# Patient Record
Sex: FEMALE | Race: BLACK OR AFRICAN AMERICAN | HISPANIC OR LATINO | Employment: UNEMPLOYED | URBAN - METROPOLITAN AREA
[De-identification: names, ages, dates, MRNs, and addresses within clinical notes are randomized per-mention and may not be internally consistent; named-entity substitution may affect disease eponyms.]

---

## 2019-11-19 ENCOUNTER — APPOINTMENT (EMERGENCY)
Dept: CT IMAGING | Facility: HOSPITAL | Age: 49
End: 2019-11-19
Payer: COMMERCIAL

## 2019-11-19 ENCOUNTER — HOSPITAL ENCOUNTER (OUTPATIENT)
Facility: HOSPITAL | Age: 49
Setting detail: OBSERVATION
Discharge: HOME | End: 2019-11-20
Attending: EMERGENCY MEDICINE | Admitting: HOSPITALIST
Payer: COMMERCIAL

## 2019-11-19 DIAGNOSIS — R22.1 NECK SWELLING: Primary | ICD-10-CM

## 2019-11-19 DIAGNOSIS — M60.9 MYOSITIS: ICD-10-CM

## 2019-11-19 LAB
AMORPH URATE CRY URNS QL MICRO: ABNORMAL /HPF
ANION GAP SERPL CALCULATED.3IONS-SCNC: 8 MMOL/L (ref 4–13)
BACTERIA UR QL AUTO: ABNORMAL /HPF
BASOPHILS # BLD AUTO: 0.06 THOUSANDS/ΜL (ref 0–0.1)
BASOPHILS NFR BLD AUTO: 1 % (ref 0–1)
BILIRUB UR QL STRIP: NEGATIVE
BUN SERPL-MCNC: 9 MG/DL (ref 5–25)
CALCIUM SERPL-MCNC: 8.6 MG/DL (ref 8.3–10.1)
CHLORIDE SERPL-SCNC: 104 MMOL/L (ref 100–108)
CLARITY UR: CLEAR
CO2 SERPL-SCNC: 27 MMOL/L (ref 21–32)
COLOR UR: YELLOW
CREAT SERPL-MCNC: 0.74 MG/DL (ref 0.6–1.3)
EOSINOPHIL # BLD AUTO: 0.2 THOUSAND/ΜL (ref 0–0.61)
EOSINOPHIL NFR BLD AUTO: 2 % (ref 0–6)
ERYTHROCYTE [DISTWIDTH] IN BLOOD BY AUTOMATED COUNT: 14.9 % (ref 11.6–15.1)
EXT PREG TEST URINE: NORMAL
EXT. CONTROL ED NAV: NORMAL
GFR SERPL CREATININE-BSD FRML MDRD: 95 ML/MIN/1.73SQ M
GLUCOSE SERPL-MCNC: 111 MG/DL (ref 65–140)
GLUCOSE UR STRIP-MCNC: NEGATIVE MG/DL
HCT VFR BLD AUTO: 40.5 % (ref 34.8–46.1)
HGB BLD-MCNC: 12.6 G/DL (ref 11.5–15.4)
HGB UR QL STRIP.AUTO: ABNORMAL
IMM GRANULOCYTES # BLD AUTO: 0.02 THOUSAND/UL (ref 0–0.2)
IMM GRANULOCYTES NFR BLD AUTO: 0 % (ref 0–2)
KETONES UR STRIP-MCNC: NEGATIVE MG/DL
LEUKOCYTE ESTERASE UR QL STRIP: NEGATIVE
LYMPHOCYTES # BLD AUTO: 2.9 THOUSANDS/ΜL (ref 0.6–4.47)
LYMPHOCYTES NFR BLD AUTO: 29 % (ref 14–44)
MCH RBC QN AUTO: 28.7 PG (ref 26.8–34.3)
MCHC RBC AUTO-ENTMCNC: 31.1 G/DL (ref 31.4–37.4)
MCV RBC AUTO: 92 FL (ref 82–98)
MONOCYTES # BLD AUTO: 0.8 THOUSAND/ΜL (ref 0.17–1.22)
MONOCYTES NFR BLD AUTO: 8 % (ref 4–12)
NEUTROPHILS # BLD AUTO: 6.05 THOUSANDS/ΜL (ref 1.85–7.62)
NEUTS SEG NFR BLD AUTO: 60 % (ref 43–75)
NITRITE UR QL STRIP: NEGATIVE
NON-SQ EPI CELLS URNS QL MICRO: ABNORMAL /HPF
NRBC BLD AUTO-RTO: 0 /100 WBCS
PH UR STRIP.AUTO: 6 [PH] (ref 4.5–8)
PLATELET # BLD AUTO: 254 THOUSANDS/UL (ref 149–390)
PMV BLD AUTO: 11.4 FL (ref 8.9–12.7)
POTASSIUM SERPL-SCNC: 3.6 MMOL/L (ref 3.5–5.3)
PROT UR STRIP-MCNC: ABNORMAL MG/DL
RBC # BLD AUTO: 4.39 MILLION/UL (ref 3.81–5.12)
RBC #/AREA URNS AUTO: ABNORMAL /HPF
SODIUM SERPL-SCNC: 139 MMOL/L (ref 136–145)
SP GR UR STRIP.AUTO: 1.02 (ref 1–1.03)
UROBILINOGEN UR QL STRIP.AUTO: 0.2 E.U./DL
WBC # BLD AUTO: 10.03 THOUSAND/UL (ref 4.31–10.16)
WBC #/AREA URNS AUTO: ABNORMAL /HPF

## 2019-11-19 PROCEDURE — 96374 THER/PROPH/DIAG INJ IV PUSH: CPT

## 2019-11-19 PROCEDURE — 85025 COMPLETE CBC W/AUTO DIFF WBC: CPT | Performed by: EMERGENCY MEDICINE

## 2019-11-19 PROCEDURE — 99284 EMERGENCY DEPT VISIT MOD MDM: CPT

## 2019-11-19 PROCEDURE — 70491 CT SOFT TISSUE NECK W/DYE: CPT

## 2019-11-19 PROCEDURE — 96361 HYDRATE IV INFUSION ADD-ON: CPT

## 2019-11-19 PROCEDURE — 81025 URINE PREGNANCY TEST: CPT | Performed by: EMERGENCY MEDICINE

## 2019-11-19 PROCEDURE — 36415 COLL VENOUS BLD VENIPUNCTURE: CPT | Performed by: EMERGENCY MEDICINE

## 2019-11-19 PROCEDURE — 81001 URINALYSIS AUTO W/SCOPE: CPT

## 2019-11-19 PROCEDURE — 99285 EMERGENCY DEPT VISIT HI MDM: CPT | Performed by: EMERGENCY MEDICINE

## 2019-11-19 PROCEDURE — 80048 BASIC METABOLIC PNL TOTAL CA: CPT | Performed by: EMERGENCY MEDICINE

## 2019-11-19 RX ORDER — KETOROLAC TROMETHAMINE 30 MG/ML
30 INJECTION, SOLUTION INTRAMUSCULAR; INTRAVENOUS ONCE
Status: COMPLETED | OUTPATIENT
Start: 2019-11-19 | End: 2019-11-19

## 2019-11-19 RX ADMIN — SODIUM CHLORIDE 500 ML: 0.9 INJECTION, SOLUTION INTRAVENOUS at 22:44

## 2019-11-19 RX ADMIN — IOHEXOL 85 ML: 350 INJECTION, SOLUTION INTRAVENOUS at 23:40

## 2019-11-19 RX ADMIN — KETOROLAC TROMETHAMINE 30 MG: 30 INJECTION, SOLUTION INTRAMUSCULAR at 22:44

## 2019-11-20 VITALS
RESPIRATION RATE: 16 BRPM | DIASTOLIC BLOOD PRESSURE: 73 MMHG | HEART RATE: 88 BPM | SYSTOLIC BLOOD PRESSURE: 129 MMHG | OXYGEN SATURATION: 96 % | TEMPERATURE: 98.1 F | WEIGHT: 229.28 LBS

## 2019-11-20 PROBLEM — M60.9 MYOSITIS: Status: ACTIVE | Noted: 2019-11-20

## 2019-11-20 PROBLEM — E66.09 OBESITY DUE TO EXCESS CALORIES: Status: ACTIVE | Noted: 2019-11-20

## 2019-11-20 LAB
ALBUMIN SERPL BCP-MCNC: 3.4 G/DL (ref 3.5–5)
ALP SERPL-CCNC: 154 U/L (ref 46–116)
ALT SERPL W P-5'-P-CCNC: 72 U/L (ref 12–78)
AST SERPL W P-5'-P-CCNC: 48 U/L (ref 5–45)
BILIRUB DIRECT SERPL-MCNC: 0.14 MG/DL (ref 0–0.2)
BILIRUB SERPL-MCNC: 0.24 MG/DL (ref 0.2–1)
CK SERPL-CCNC: 56 U/L (ref 26–192)
PROCALCITONIN SERPL-MCNC: <0.05 NG/ML
PROT SERPL-MCNC: 8.2 G/DL (ref 6.4–8.2)

## 2019-11-20 PROCEDURE — 36415 COLL VENOUS BLD VENIPUNCTURE: CPT | Performed by: EMERGENCY MEDICINE

## 2019-11-20 PROCEDURE — 82550 ASSAY OF CK (CPK): CPT | Performed by: EMERGENCY MEDICINE

## 2019-11-20 PROCEDURE — 84145 PROCALCITONIN (PCT): CPT | Performed by: PHYSICIAN ASSISTANT

## 2019-11-20 PROCEDURE — 99234 HOSP IP/OBS SM DT SF/LOW 45: CPT | Performed by: HOSPITALIST

## 2019-11-20 PROCEDURE — 80076 HEPATIC FUNCTION PANEL: CPT | Performed by: PHYSICIAN ASSISTANT

## 2019-11-20 RX ORDER — METHYLPREDNISOLONE SODIUM SUCCINATE 40 MG/ML
40 INJECTION, POWDER, LYOPHILIZED, FOR SOLUTION INTRAMUSCULAR; INTRAVENOUS EVERY 12 HOURS SCHEDULED
Status: DISCONTINUED | OUTPATIENT
Start: 2019-11-20 | End: 2019-11-20 | Stop reason: HOSPADM

## 2019-11-20 RX ORDER — ACETAMINOPHEN 325 MG/1
650 TABLET ORAL EVERY 6 HOURS PRN
Status: DISCONTINUED | OUTPATIENT
Start: 2019-11-20 | End: 2019-11-20 | Stop reason: HOSPADM

## 2019-11-20 RX ORDER — KETOROLAC TROMETHAMINE 30 MG/ML
30 INJECTION, SOLUTION INTRAMUSCULAR; INTRAVENOUS ONCE
Status: COMPLETED | OUTPATIENT
Start: 2019-11-20 | End: 2019-11-20

## 2019-11-20 RX ORDER — TRAMADOL HYDROCHLORIDE 50 MG/1
50 TABLET ORAL EVERY 6 HOURS PRN
Status: DISCONTINUED | OUTPATIENT
Start: 2019-11-20 | End: 2019-11-20 | Stop reason: HOSPADM

## 2019-11-20 RX ORDER — METHYLPREDNISOLONE 4 MG/1
TABLET ORAL
Qty: 1 EACH | Refills: 0 | Status: SHIPPED | OUTPATIENT
Start: 2019-11-20 | End: 2021-07-02

## 2019-11-20 RX ORDER — AMOXICILLIN AND CLAVULANATE POTASSIUM 875; 125 MG/1; MG/1
1 TABLET, FILM COATED ORAL EVERY 12 HOURS SCHEDULED
Qty: 20 TABLET | Refills: 0 | Status: SHIPPED | OUTPATIENT
Start: 2019-11-20 | End: 2019-11-30

## 2019-11-20 RX ORDER — OXYCODONE HYDROCHLORIDE 5 MG/1
5 TABLET ORAL EVERY 6 HOURS PRN
Status: DISCONTINUED | OUTPATIENT
Start: 2019-11-20 | End: 2019-11-20 | Stop reason: HOSPADM

## 2019-11-20 RX ORDER — CEFAZOLIN SODIUM 2 G/50ML
2000 SOLUTION INTRAVENOUS EVERY 8 HOURS
Status: DISCONTINUED | OUTPATIENT
Start: 2019-11-20 | End: 2019-11-20 | Stop reason: HOSPADM

## 2019-11-20 RX ORDER — ONDANSETRON 2 MG/ML
4 INJECTION INTRAMUSCULAR; INTRAVENOUS EVERY 6 HOURS PRN
Status: DISCONTINUED | OUTPATIENT
Start: 2019-11-20 | End: 2019-11-20 | Stop reason: HOSPADM

## 2019-11-20 RX ADMIN — CEFAZOLIN SODIUM 2000 MG: 2 SOLUTION INTRAVENOUS at 10:59

## 2019-11-20 RX ADMIN — METRONIDAZOLE 500 MG: 500 INJECTION, SOLUTION INTRAVENOUS at 03:35

## 2019-11-20 RX ADMIN — METHYLPREDNISOLONE SODIUM SUCCINATE 40 MG: 40 INJECTION, POWDER, FOR SOLUTION INTRAMUSCULAR; INTRAVENOUS at 02:21

## 2019-11-20 RX ADMIN — KETOROLAC TROMETHAMINE 30 MG: 30 INJECTION, SOLUTION INTRAMUSCULAR at 01:18

## 2019-11-20 RX ADMIN — METRONIDAZOLE 500 MG: 500 INJECTION, SOLUTION INTRAVENOUS at 11:36

## 2019-11-20 RX ADMIN — CEFAZOLIN SODIUM 2000 MG: 2 SOLUTION INTRAVENOUS at 02:30

## 2019-11-20 NOTE — H&P
H&P- Hadley Tanner 1970, 51 y o  female MRN: [id number]    Unit/Bed#: [facility] [address] 220-02 Encounter: [id number]    Primary Care Provider: No primary care provider on file  Date and time admitted to hospital: 11/19/2019 10:09 PM    * Myositis  Assessment & Plan  · Presents to the emergency department with left sided neck pain x 3 days - atraumatic  · Vital signs stable at time of admission  Afebrile  · CT soft tissue neck: "Mild to moderate subcutaneous inflammatory stranding involving the posterolateral left neck with prominent to mildly enlarged left level 2A, 2B and level 5A lymph nodes  The findings are suspicious for an infectious or inflammatory process  Mild thickening and inflammatory stranding surrounding the left sternocleidomastoid muscle suspicious for an acute myositis  No abscess "  · CBC without leukocytosis  CMP WNL  · Initiated on unasyn in the ED but suspect more inflammatory than infectious at this time    · Trial steroids for inflammation  · CT neck recommended in 6-8 weeks to monitor for improvement    Obesity due to excess calories  Assessment & Plan  · Would benefit from weight loss  · Discussed lifestyle modifications    VTE Prophylaxis: Low risk, ambulate  / reason for no mechanical VTE prophylaxis Low risk, ambulate   Code Status:  Level 1 - full code  POLST: POLST form is not discussed and not completed at this time  Discussion with family:  Discussed with patient, patient's daughter and  at bedside    Anticipated Length of Stay:  Patient will be admitted on an Observation basis with an anticipated length of stay of  less than 2 midnights  Justification for Hospital Stay:  Myositis of sternocleidomastoid    Total Time for Visit, including Counseling / Coordination of Care: 45 minutes  Greater than 50% of this total time spent on direct patient counseling and coordination of care      Chief Complaint:   "I have been having neck pain"    History of Present Illness:    Conor Dailey is a 51 y o  female who presents with neck pain  Patient without significant past medical history  Patient is primarily Irish-speaking, therefore translation was required to obtain HPI  Patient states that the left-sided neck pain began about 3 days ago and has been progressively getting worse  Exacerbated when attempting to turn her head to the left  Denies any recent travel, trauma, any breaks in the skin to the area  Denies any strenuous activity, no fever/chills  Denies any medications or supplements  Denies any muscular pain elsewhere in the body  Currently feels improved after medications received in the emergency department  Denies any difficulty swallowing, voice changes, ear pain  Review of Systems:    Review of Systems   Constitutional: Negative for chills, fatigue, fever and unexpected weight change  HENT: Negative for congestion, sore throat and trouble swallowing  Eyes: Negative for photophobia, pain and visual disturbance  Respiratory: Negative for cough, shortness of breath and wheezing  Cardiovascular: Negative for chest pain, palpitations and leg swelling  Gastrointestinal: Negative for abdominal pain, constipation, diarrhea, nausea and vomiting  Endocrine: Negative for polyuria  Genitourinary: Negative for difficulty urinating, dysuria, flank pain, hematuria and urgency  Musculoskeletal: Positive for myalgias and neck pain  Negative for back pain and neck stiffness  Left sided neck pain   Skin: Negative for pallor and rash  Neurological: Negative for dizziness, tremors, syncope, weakness, light-headedness, numbness and headaches  Hematological: Does not bruise/bleed easily  Psychiatric/Behavioral: Negative for agitation and confusion  Past Medical and Surgical History:     History reviewed  No pertinent past medical history  History reviewed  No pertinent surgical history      Meds/Allergies:    Prior to Admission medications    Not on File     I have reviewed home medications with patient personally  Allergies: No Known Allergies    Social History:     Marital Status: /Civil Union   Occupation: Employed  Patient Pre-hospital Living Situation: Lives with family  Patient Pre-hospital Level of Mobility: Ambulatory  Patient Pre-hospital Diet Restrictions: None  Substance Use History:   Social History     Substance and Sexual Activity   Alcohol Use Never    Frequency: Never     Social History     Tobacco Use   Smoking Status Never Smoker   Smokeless Tobacco Never Used     Social History     Substance and Sexual Activity   Drug Use Never       Family History:    History reviewed  No pertinent family history  Physical Exam:     Vitals:   Blood Pressure: 134/77 (11/20/19 0140)  Pulse: 83 (11/20/19 0140)  Temperature: 98 4 °F (36 9 °C) (11/20/19 0140)  Respirations: 18 (11/20/19 0140)  Weight - Scale: 104 kg (229 lb 4 5 oz) (11/19/19 2151)  SpO2: 98 % (11/20/19 0140)    Physical Exam   Constitutional: She is oriented to person, place, and time  Vital signs are normal  She appears well-developed  Appears comfortable, no acute distress   HENT:   Head: Normocephalic  Eyes: Pupils are equal, round, and reactive to light  Conjunctivae and EOM are normal  No scleral icterus  Neck: Decreased range of motion present  Cardiovascular: Normal rate, regular rhythm and normal heart sounds  No murmur heard  Pulmonary/Chest: Effort normal and breath sounds normal  No respiratory distress  She has no wheezes  She has no rhonchi  She has no rales  Abdominal: Soft  Bowel sounds are normal  There is no tenderness  There is no rigidity, no rebound and no guarding  Obese abdomen   Musculoskeletal: She exhibits tenderness and deformity  She exhibits no edema  Able to move upper and lower extremities bilaterally without difficulty   Neurological: She is alert and oriented to person, place, and time     Skin: Skin is warm and dry  Psychiatric: She has a normal mood and affect  Her speech is normal and behavior is normal    Nursing note and vitals reviewed  Additional Data:     Lab Results: I have personally reviewed pertinent reports  Results from last 7 days   Lab Units 11/19/19  2232   WBC Thousand/uL 10 03   HEMOGLOBIN g/dL 12 6   HEMATOCRIT % 40 5   PLATELETS Thousands/uL 254   NEUTROS PCT % 60   LYMPHS PCT % 29   MONOS PCT % 8   EOS PCT % 2     Results from last 7 days   Lab Units 11/19/19  2232   SODIUM mmol/L 139   POTASSIUM mmol/L 3 6   CHLORIDE mmol/L 104   CO2 mmol/L 27   BUN mg/dL 9   CREATININE mg/dL 0 74   ANION GAP mmol/L 8   CALCIUM mg/dL 8 6   GLUCOSE RANDOM mg/dL 111                       Imaging: I have personally reviewed pertinent reports  CT soft tissue neck with contrast   Final Result by Shant Ferrara MD (11/20 0013)      Mild to moderate subcutaneous inflammatory stranding involving the posterolateral left neck with prominent to mildly enlarged left level 2A, 2B and level 5A lymph nodes  The findings are suspicious for an infectious or inflammatory process  A repeat CT    neck after treatment in 6-8 weeks is recommended to assess for improvement/resolution  Mild thickening and inflammatory stranding surrounding the left sternocleidomastoid muscle suspicious for an acute myositis  No focal fluid collection to suggest an abscess  Workstation performed: [id number]             EKG, Pathology, and Other Studies Reviewed on Admission:   · Labs and imaging reviewed    Allscripts / Epic Records Reviewed: Yes     ** Please Note: This note has been constructed using a voice recognition system   **

## 2019-11-20 NOTE — PLAN OF CARE
Problem: PAIN - ADULT  Goal: Verbalizes/displays adequate comfort level or baseline comfort level  Description  Interventions:  - Encourage patient to monitor pain and request assistance  - Assess pain using appropriate pain scale  - Administer analgesics based on type and severity of pain and evaluate response  - Implement non-pharmacological measures as appropriate and evaluate response  - Consider cultural and social influences on pain and pain management  - Notify physician/advanced practitioner if interventions unsuccessful or patient reports new pain  Outcome: Progressing     Problem: INFECTION - ADULT  Goal: Absence or prevention of progression during hospitalization  Description  INTERVENTIONS:  - Assess and monitor for signs and symptoms of infection  - Monitor lab/diagnostic results  - Monitor all insertion sites, i e  indwelling lines, tubes, and drains  - Monitor endotracheal if appropriate and nasal secretions for changes in amount and color  - [place] appropriate cooling/warming therapies per order  - Administer medications as ordered  - Instruct and encourage patient and family to use good hand hygiene technique  - Identify and instruct in appropriate isolation precautions for identified infection/condition  Outcome: Progressing     Problem: SAFETY ADULT  Goal: Patient will remain free of falls  Description  INTERVENTIONS:  - Assess patient frequently for physical needs  -  Identify cognitive and physical deficits and behaviors that affect risk of falls    -  [place] fall precautions as indicated by assessment   - Educate patient/family on patient safety including physical limitations  - Instruct patient to call for assistance with activity based on assessment  - Modify environment to reduce risk of injury  - Consider OT/PT consult to assist with strengthening/mobility  Outcome: Progressing  Goal: Maintain or return to baseline ADL function  Description  INTERVENTIONS:  -  Assess patient's ability to carry out ADLs; assess patient's baseline for ADL function and identify physical deficits which impact ability to perform ADLs (bathing, care of mouth/teeth, toileting, grooming, dressing, etc )  - Assess/evaluate cause of self-care deficits   - Assess range of motion  - Assess patient's mobility; develop plan if impaired  - Assess patient's need for assistive devices and provide as appropriate  - Encourage maximum independence but intervene and supervise when necessary  - Involve family in performance of ADLs  - Assess for home care needs following discharge   - Consider OT consult to assist with ADL evaluation and planning for discharge  - Provide patient education as appropriate  Outcome: Progressing  Goal: Maintain or return mobility status to optimal level  Description  INTERVENTIONS:  - Assess patient's baseline mobility status (ambulation, transfers, stairs, etc )    - Identify cognitive and physical deficits and behaviors that affect mobility  - Identify mobility aids required to assist with transfers and/or ambulation (gait belt, sit-to-stand, lift, walker, cane, etc )  - [place] fall precautions as indicated by assessment  - Record patient progress and toleration of activity level on Mobility SBAR; progress patient to next Phase/Stage  - Instruct patient to call for assistance with activity based on assessment  - Consider rehabilitation consult to assist with strengthening/weightbearing, etc   Outcome: Progressing     Problem: DISCHARGE PLANNING  Goal: Discharge to home or other facility with appropriate resources  Description  INTERVENTIONS:  - Identify barriers to discharge w/patient and caregiver  - Arrange for needed discharge resources and transportation as appropriate  - Identify discharge learning needs (meds, wound care, etc )  - Arrange for interpretive services to assist at discharge as needed  - Refer to Case Management Department for coordinating discharge planning if the patient needs post-hospital services based on physician/advanced practitioner order or complex needs related to functional status, cognitive ability, or social support system  Outcome: Progressing     Problem: Knowledge Deficit  Goal: Patient/family/caregiver demonstrates understanding of disease process, treatment plan, medications, and discharge instructions  Description  Complete learning assessment and assess knowledge base    Interventions:  - Provide teaching at level of understanding  - Provide teaching via preferred learning methods  Outcome: Progressing     Problem: SKIN/TISSUE INTEGRITY - ADULT  Goal: Skin integrity remains intact  Description  INTERVENTIONS  - Identify patients at risk for skin breakdown  - Assess and monitor skin integrity  - Assess and monitor nutrition and hydration status  - Monitor labs (i e  albumin)  - Assess for incontinence   - Turn and reposition patient  - Assist with mobility/ambulation  - Relieve pressure over bony prominences  - Avoid friction and shearing  - Provide appropriate hygiene as needed including keeping skin clean and dry  - Evaluate need for skin moisturizer/barrier cream  - Collaborate with interdisciplinary team (i e  Nutrition, Rehabilitation, etc )   - Patient/family teaching  Outcome: Progressing  Goal: Incision(s), wounds(s) or drain site(s) healing without S/S of infection  Description  INTERVENTIONS  - Assess and document risk factors for skin impairment   - Assess and document dressing, incision, wound bed, drain sites and surrounding tissue  - Consider nutrition services referral as needed  - Oral mucous membranes remain intact  - Provide patient/ family education  Outcome: Progressing  Goal: Oral mucous membranes remain intact  Description  INTERVENTIONS  - Assess oral mucosa and hygiene practices  - Implement preventative oral hygiene regimen  - Implement oral medicated treatments as ordered  - Initiate Nutrition services referral as needed  Outcome: Progressing     Problem: INFECTION - ADULT  Goal: Absence of fever/infection during neutropenic period  Description  INTERVENTIONS:  - Monitor WBC    Outcome: Completed

## 2019-11-20 NOTE — ED PROVIDER NOTES
History  Chief Complaint   Patient presents with    Neck Swelling     pt reports left sided neck swelling since sunday, reports pain radiates into back of head, no difficulty swallowing or breathing       History provided by:  Patient   used: No    Neck Pain   Pain location:  L side  Quality:  Aching  Pain radiates to:  Does not radiate  Pain severity:  Moderate  Pain is: Worse during the night  Onset quality:  Gradual  Duration:  3 days  Timing:  Intermittent  Progression:  Waxing and waning  Chronicity:  New  Relieved by:  Nothing  Worsened by:  Nothing  Ineffective treatments:  None tried  Associated symptoms: no bladder incontinence, no bowel incontinence, no chest pain, no fever, no headaches, no numbness and no photophobia    Risk factors comment:  None      None       History reviewed  No pertinent past medical history  No past surgical history on file  History reviewed  No pertinent family history  I have reviewed and agree with the history as documented  Social History     Tobacco Use    Smoking status: Never Smoker   Substance Use Topics    Alcohol use: Never     Frequency: Never    Drug use: Never        Review of Systems   Constitutional: Negative for chills and fever  HENT: Negative for facial swelling, sore throat and trouble swallowing  Eyes: Negative for photophobia, pain and visual disturbance  Respiratory: Negative for cough and shortness of breath  Cardiovascular: Negative for chest pain and leg swelling  Gastrointestinal: Negative for abdominal pain, blood in stool, bowel incontinence, diarrhea, nausea and vomiting  Genitourinary: Negative for bladder incontinence, dysuria and flank pain  Musculoskeletal: Positive for neck pain  Negative for back pain and neck stiffness  Skin: Negative for pallor and rash  Allergic/Immunologic: Negative for environmental allergies and immunocompromised state     Neurological: Negative for dizziness, numbness and headaches  Hematological: Negative for adenopathy  Does not bruise/bleed easily  Psychiatric/Behavioral: Negative for agitation and behavioral problems  All other systems reviewed and are negative  Physical Exam  Physical Exam   Constitutional: She is oriented to person, place, and time  She appears well-developed and well-nourished  No distress  HENT:   Head: Normocephalic and atraumatic  Eyes: Pupils are equal, round, and reactive to light  EOM are normal    Left upper neck swelling and tenderness, no mastoid erythema or tednerness;normal TMs bilaterally   Neck: Normal range of motion  Neck supple  Cardiovascular: Normal rate, regular rhythm, normal heart sounds and intact distal pulses  Pulmonary/Chest: Effort normal and breath sounds normal    Abdominal: Soft  Bowel sounds are normal  There is no tenderness  There is no rebound and no guarding  Musculoskeletal: Normal range of motion  Neurological: She is alert and oriented to person, place, and time  Skin: Skin is warm and dry  Psychiatric: She has a normal mood and affect  Nursing note and vitals reviewed        Vital Signs  ED Triage Vitals [11/19/19 2151]   Temperature Pulse Respirations Blood Pressure SpO2   98 3 °F (36 8 °C) (!) 107 18 (!) 187/84 99 %      Temp src Heart Rate Source Patient Position - Orthostatic VS BP Location FiO2 (%)   -- Monitor -- -- --      Pain Score       6           Vitals:    11/19/19 2151 11/20/19 0051   BP: (!) 187/84 146/78   Pulse: (!) 107 88         Visual Acuity      ED Medications  Medications   ketorolac (TORADOL) injection 30 mg (has no administration in time range)   ampicillin-sulbactam (UNASYN) 3 g in sodium chloride 0 9 % 100 mL IVPB (has no administration in time range)   sodium chloride 0 9 % bolus 500 mL (0 mL Intravenous Stopped 11/20/19 0051)   ketorolac (TORADOL) injection 30 mg (30 mg Intravenous Given 11/19/19 2244)   iohexol (OMNIPAQUE) 350 MG/ML injection (SINGLE-DOSE) 85 mL (85 mL Intravenous Given 11/19/19 2340)       Diagnostic Studies  Results Reviewed     Procedure Component Value Units Date/Time    Hepatic function panel [[id number]]     Lab Status:  No result Specimen:  Blood     CK Total with Reflex CKMB [[id number]] Collected:  11/20/19 0056    Lab Status:   In process Specimen:  Blood from Arm, Left Updated:  11/20/19 0100    Urine Microscopic [[id number]]  (Abnormal) Collected:  11/19/19 2242    Lab Status:  Final result Specimen:  Urine, Clean Catch Updated:  11/19/19 2301     RBC, UA 4-10 /hpf      WBC, UA 4-10 /hpf      Epithelial Cells Occasional /hpf      Bacteria, UA Occasional /hpf      AMORPH URATES Occasional /hpf     Basic metabolic panel [[id number]] Collected:  11/19/19 2232    Lab Status:  Final result Specimen:  Blood from Arm, Left Updated:  11/19/19 2257     Sodium 139 mmol/L      Potassium 3 6 mmol/L      Chloride 104 mmol/L      CO2 27 mmol/L      ANION GAP 8 mmol/L      BUN 9 mg/dL      Creatinine 0 74 mg/dL      Glucose 111 mg/dL      Calcium 8 6 mg/dL      eGFR 95 ml/min/1 73sq m     Narrative:       [place] guidelines for Chronic Kidney Disease (CKD):     Stage 1 with normal or high GFR (GFR > 90 mL/min/1 73 square meters)    Stage 2 Mild CKD (GFR = 60-89 mL/min/1 73 square meters)    Stage 3A Moderate CKD (GFR = 45-59 mL/min/1 73 square meters)    Stage 3B Moderate CKD (GFR = 30-44 mL/min/1 73 square meters)    Stage 4 Severe CKD (GFR = 15-29 mL/min/1 73 square meters)    Stage 5 End Stage CKD (GFR <15 mL/min/1 73 square meters)  Note: GFR calculation is accurate only with a steady state creatinine    POCT urinalysis dipstick [[id number]]  (Abnormal) Resulted:  11/19/19 2243    Lab Status:  Final result Specimen:  Urine Updated:  11/19/19 2246    POCT pregnancy, urine [[id number]]  (Normal) Resulted:  11/19/19 2243    Lab Status:  Final result Updated:  11/19/19 2246     EXT PREG TEST UR (Ref: Negative) Negative (-)     Control Valid    Urine Macroscopic, POC [[id number]]  (Abnormal) Collected:  11/19/19 2242    Lab Status:  Final result Specimen:  Urine Updated:  11/19/19 2243     Color, UA Yellow     Clarity, UA Clear     pH, UA 6 0     Leukocytes, UA Negative     Nitrite, UA Negative     Protein, UA Trace mg/dl      Glucose, UA Negative mg/dl      Ketones, UA Negative mg/dl      Urobilinogen, UA 0 2 E U /dl      Bilirubin, UA Negative     Blood, UA Small     Specific Rena Lara, UA 1 025    Narrative:       CLINITEK RESULT    CBC and differential [[id number]]  (Abnormal) Collected:  11/19/19 2232    Lab Status:  Final result Specimen:  Blood from Arm, Left Updated:  11/19/19 2238     WBC 10 03 Thousand/uL      RBC 4 39 Million/uL      Hemoglobin 12 6 g/dL      Hematocrit 40 5 %      MCV 92 fL      MCH 28 7 pg      MCHC 31 1 g/dL      RDW 14 9 %      MPV 11 4 fL      Platelets 125 Thousands/uL      nRBC 0 /100 WBCs      Neutrophils Relative 60 %      Immat GRANS % 0 %      Lymphocytes Relative 29 %      Monocytes Relative 8 %      Eosinophils Relative 2 %      Basophils Relative 1 %      Neutrophils Absolute 6 05 Thousands/µL      Immature Grans Absolute 0 02 Thousand/uL      Lymphocytes Absolute 2 90 Thousands/µL      Monocytes Absolute 0 80 Thousand/µL      Eosinophils Absolute 0 20 Thousand/µL      Basophils Absolute 0 06 Thousands/µL                  CT soft tissue neck with contrast   Final Result by Malinda Weber MD (11/20 0013)      Mild to moderate subcutaneous inflammatory stranding involving the posterolateral left neck with prominent to mildly enlarged left level 2A, 2B and level 5A lymph nodes  The findings are suspicious for an infectious or inflammatory process  A repeat CT    neck after treatment in 6-8 weeks is recommended to assess for improvement/resolution  Mild thickening and inflammatory stranding surrounding the left sternocleidomastoid muscle suspicious for an acute myositis        No focal fluid collection to suggest an abscess  Workstation performed: [id number]                    Procedures  Procedures       ED Course  ED Course as of Nov 20 0116 Wed Nov 20, 2019   0050 WBC: 10 03   0050 Hemoglobin: 12 6   0050 Sodium: 139   0050 Labs wnl  Potassium: 3 6   0050 CT shows left SCM inflammation vs infection, ? Myositis; we will give Toradol and Unasyn, observe in hospital for worsening swelling,possible ENT evaluation in morning  Case discussed with Chanda Smith with [facility]  MDM  Number of Diagnoses or Management Options  Neck swelling: new and requires workup  Diagnosis management comments: Patient is a 50-year-old female, no significant past medical history, comes in with left upper neck swelling, started 3 days back, getting worse, denies dysphagia, fever, chills, ear pain, chest pain or dyspnea  On exam no acute distress:  Vital signs are noted, afebrile, left upper neck swelling noted around the angle of the mandible, no mastoid tenderness or erythema, no tongue swelling, no submental swelling, no drooling or trismus  Differential diagnosis:  Parotitis, we will check labs, CT Neck to r/o abscess            Amount and/or Complexity of Data Reviewed  Clinical lab tests: ordered and reviewed  Tests in the radiology section of CPT®: ordered and reviewed  Tests in the medicine section of CPT®: reviewed and ordered  Independent visualization of images, tracings, or specimens: yes        Disposition  Final diagnoses:   Neck swelling   Myositis     Time reflects when diagnosis was documented in both MDM as applicable and the Disposition within this note     Time User Action Codes Description Comment    11/19/2019 10:31 PM Sandi Hamilton [R22 1] Neck swelling     11/20/2019  1:16 AM Sandi Hamilton [M60 9] Myositis       ED Disposition     ED Disposition Condition Date/Time Comment    Admit Stable Wed Nov 20, 2019 12:56 AM Case [facility] Cody Menon[facility]    None         Patient's Medications    No medications on file     No discharge procedures on file      ED Provider  Electronically Signed by           Tony Dave MD  11/20/19 6496

## 2019-11-20 NOTE — DISCHARGE SUMMARY
Discharge- Ale Cristobal 1970, 51 y o  female MRN: [id number]    Unit/Bed#: [address] 220-02 Encounter: [id number]    Primary Care Provider: No primary care provider on file  Date and time admitted to hospital: 11/19/2019 10:09 PM        * Myositis  Assessment & Plan  Unclear cause  I do not see a tooth abscess or port of entry for infection  It is 50% better  Patient is having no trouble breathing nor pain and wants to go home  I spoke with on call ENT at [facility], Dr Rosita Streeter  He suggested Augmentin and medrol dose pack, but the patient needs to be seen this week by Dr Tevin Greenfield or Dr Ary Casarez  I have made an ambulatory referral for tomorrow in their office        Discharging Physician / Practitioner: Reyes Barcelona, DO  PCP: No primary care provider on file  Admission Date:   Admission Orders (From admission, onward)     Ordered        11/20/19 0057  Place in Observation  Once         11/20/19 0057  Place in Observation  Once                   Discharge Date: 11/20/19    Resolved Problems  Date Reviewed: 11/20/2019    None                  Procedures Performed:     · CT neck      Reason for Admission: left neck swelling      Hospital Course:     Ale Cristobal is a 51 y o  female patient who originally presented to the hospital on 11/19/2019 due to increased left neck swelling  CT showed infectious versus inflammatory changes around the sternoclidomastoid  We did not see any source of infection  No obvious tooth abscess  No port of entry  No trauma to the neck  She rapidly improved on IV abx and steroids  I spoke with on call ENT, Dr Rosita Streeter  I will send her home on a course of Augementin and medrol dose pack  He did [facility] to make sure that this improves as the source is unclear  Please see above list of diagnoses and related plan for additional information         Condition at Discharge: good Discharge Day Visit / Exam:     Subjective:  Feels well  Much less neck swelling  No neck pain  No fever or chills  No tooth pain      Vitals: Blood Pressure: 129/73 (11/20/19 0725)  Pulse: 88 (11/20/19 0725)  Temperature: 98 1 °F (36 7 °C) (11/20/19 0725)  Respirations: 16 (11/20/19 0725)  Weight - Scale: 104 kg (229 lb 4 5 oz) (11/19/19 2151)  SpO2: 96 % (11/20/19 0725)    Exam:     Physical Exam   HENT:   Head: Normocephalic and atraumatic  Eyes: Pupils are equal, round, and reactive to light  EOM are normal    Neck:   Mild swelling to left neck:    No obvious abscess  No warmth  No erythema     Cardiovascular: Normal rate and regular rhythm  Exam reveals no gallop and no friction rub  No murmur heard  Pulmonary/Chest: Effort normal and breath sounds normal  She has no wheezes  She has no rales  Abdominal: Soft  Bowel sounds are normal  There is no tenderness  Musculoskeletal: She exhibits no edema  Nursing note and vitals reviewed            Discharge instructions/Information to patient and family:   See after visit summary for information provided to patient and family  Provisions for Follow-Up Care:  See after visit summary for information related to follow-up care and any pertinent home health orders  Disposition:     Home       Discharge Statement:  I spent 36 minutes discharging the patient  This time was spent on the day of discharge  I had direct contact with the patient on the day of discharge  Greater than 50% of the total time was spent examining patient, answering all patient questions, arranging and discussing plan of care with patient as well as directly providing post-discharge instructions  Additional time then spent on discharge activities  Discharge Medications:  See after visit summary for reconciled discharge medications provided to patient and family        ** Please Note: This note has been constructed using a voice recognition system **

## 2019-11-20 NOTE — SOCIAL WORK
Pt admitted as observation with myositis-notification paperwork given by registration  Pt is primarily [address] speaking with daughter providing translation as per pt's request   Pt lives with family in a [facility] being independent with all aspects of care and ambulating without an AD  Pt does not work with household income via 's work- does not have insurance currently with MA pending  Denies MH hx, D&A, legal issues nor having a POA- recognizes her  as her healthcare representative  Pt does not have a PCP with [facility] explained to pt/daughter- understanding of same and will call to establish care  Pt has no established pharmacy and will take scripts to pharmacy close to her home  Pt does not drive, typically walks or has family drive  Her nephew will transport her home on d/c  No further questions/concerns voiced  No barriers to d/c identified

## 2019-11-20 NOTE — PLAN OF CARE
Problem: PAIN - ADULT  Goal: Verbalizes/displays adequate comfort level or baseline comfort level  Description  Interventions:  - Encourage patient to monitor pain and request assistance  - Assess pain using appropriate pain scale  - Administer analgesics based on type and severity of pain and evaluate response  - Implement non-pharmacological measures as appropriate and evaluate response  - Consider cultural and social influences on pain and pain management  - Notify physician/advanced practitioner if interventions unsuccessful or patient reports new pain  Outcome: Progressing     Problem: INFECTION - ADULT  Goal: Absence or prevention of progression during hospitalization  Description  INTERVENTIONS:  - Assess and monitor for signs and symptoms of infection  - Monitor lab/diagnostic results  - Monitor all insertion sites, i e  indwelling lines, tubes, and drains  - Monitor endotracheal if appropriate and nasal secretions for changes in amount and color  - [place] appropriate cooling/warming therapies per order  - Administer medications as ordered  - Instruct and encourage patient and family to use good hand hygiene technique  - Identify and instruct in appropriate isolation precautions for identified infection/condition  Outcome: Progressing  Goal: Absence of fever/infection during neutropenic period  Description  INTERVENTIONS:  - Monitor WBC    Outcome: Progressing     Problem: SAFETY ADULT  Goal: Patient will remain free of falls  Description  INTERVENTIONS:  - Assess patient frequently for physical needs  -  Identify cognitive and physical deficits and behaviors that affect risk of falls    -  [place] fall precautions as indicated by assessment   - Educate patient/family on patient safety including physical limitations  - Instruct patient to call for assistance with activity based on assessment  - Modify environment to reduce risk of injury  - Consider OT/PT consult to assist with strengthening/mobility  Outcome: Progressing  Goal: Maintain or return to baseline ADL function  Description  INTERVENTIONS:  -  Assess patient's ability to carry out ADLs; assess patient's baseline for ADL function and identify physical deficits which impact ability to perform ADLs (bathing, care of mouth/teeth, toileting, grooming, dressing, etc )  - Assess/evaluate cause of self-care deficits   - Assess range of motion  - Assess patient's mobility; develop plan if impaired  - Assess patient's need for assistive devices and provide as appropriate  - Encourage maximum independence but intervene and supervise when necessary  - Involve family in performance of ADLs  - Assess for home care needs following discharge   - Consider OT consult to assist with ADL evaluation and planning for discharge  - Provide patient education as appropriate  Outcome: Progressing  Goal: Maintain or return mobility status to optimal level  Description  INTERVENTIONS:  - Assess patient's baseline mobility status (ambulation, transfers, stairs, etc )    - Identify cognitive and physical deficits and behaviors that affect mobility  - Identify mobility aids required to assist with transfers and/or ambulation (gait belt, sit-to-stand, lift, walker, cane, etc )  - [place] fall precautions as indicated by assessment  - Record patient progress and toleration of activity level on Mobility SBAR; progress patient to next Phase/Stage  - Instruct patient to call for assistance with activity based on assessment  - Consider rehabilitation consult to assist with strengthening/weightbearing, etc   Outcome: Progressing     Problem: DISCHARGE PLANNING  Goal: Discharge to home or other facility with appropriate resources  Description  INTERVENTIONS:  - Identify barriers to discharge w/patient and caregiver  - Arrange for needed discharge resources and transportation as appropriate  - Identify discharge learning needs (meds, wound care, etc )  - Arrange for interpretive services to assist at discharge as needed  - Refer to Case Management Department for coordinating discharge planning if the patient needs post-hospital services based on physician/advanced practitioner order or complex needs related to functional status, cognitive ability, or social support system  Outcome: Progressing     Problem: Knowledge Deficit  Goal: Patient/family/caregiver demonstrates understanding of disease process, treatment plan, medications, and discharge instructions  Description  Complete learning assessment and assess knowledge base    Interventions:  - Provide teaching at level of understanding  - Provide teaching via preferred learning methods  Outcome: Progressing     Problem: SKIN/TISSUE INTEGRITY - ADULT  Goal: Skin integrity remains intact  Description  INTERVENTIONS  - Identify patients at risk for skin breakdown  - Assess and monitor skin integrity  - Assess and monitor nutrition and hydration status  - Monitor labs (i e  albumin)  - Assess for incontinence   - Turn and reposition patient  - Assist with mobility/ambulation  - Relieve pressure over bony prominences  - Avoid friction and shearing  - Provide appropriate hygiene as needed including keeping skin clean and dry  - Evaluate need for skin moisturizer/barrier cream  - Collaborate with interdisciplinary team (i e  Nutrition, Rehabilitation, etc )   - Patient/family teaching  Outcome: Progressing  Goal: Incision(s), wounds(s) or drain site(s) healing without S/S of infection  Description  INTERVENTIONS  - Assess and document risk factors for skin impairment   - Assess and document dressing, incision, wound bed, drain sites and surrounding tissue  - Consider nutrition services referral as needed  - Oral mucous membranes remain intact  - Provide patient/ family education  Outcome: Progressing  Goal: Oral mucous membranes remain intact  Description  INTERVENTIONS  - Assess oral mucosa and hygiene practices  - Implement preventative oral hygiene regimen  - Implement oral medicated treatments as ordered  - Initiate Nutrition services referral as needed  Outcome: Progressing

## 2019-11-20 NOTE — NURSING NOTE
All discharge paperwork was provided in [place] and [place] for the family  It was reviewed with the patient, daughter and  who were all present  The importance of establishing care with a PCP was discussed  Antibiotics and oral steroids were explained that they should be finished in full  Information was provided about a follow up appointment for the swelling in her neck

## 2019-11-20 NOTE — ASSESSMENT & PLAN NOTE
· Presents to the emergency department with left sided neck pain x 3 days - atraumatic  · Vital signs stable at time of admission  Afebrile  · CT soft tissue neck: "Mild to moderate subcutaneous inflammatory stranding involving the posterolateral left neck with prominent to mildly enlarged left level 2A, 2B and level 5A lymph nodes  The findings are suspicious for an infectious or inflammatory process  Mild thickening and inflammatory stranding surrounding the left sternocleidomastoid muscle suspicious for an acute myositis  No abscess "  · CBC without leukocytosis  CMP WNL      · Initiated on unasyn in the ED but suspect more inflammatory than infectious at this time    · Trial steroids for inflammation  · CT neck recommended in 6-8 weeks to monitor for improvement

## 2019-11-20 NOTE — ASSESSMENT & PLAN NOTE
Unclear cause  I do not see a tooth abscess or port of entry for infection  It is 50% better  Patient is having no trouble breathing nor pain and wants to go home  I spoke with on call ENT at [facility], Dr Rosita Streeter  He suggested Augmentin and medrol dose pack, but the patient needs to be seen this week by Dr Tevin Greenfield or Dr Ary Casarez    I have made an ambulatory referral for tomorrow in their office

## 2021-07-02 ENCOUNTER — OFFICE VISIT (OUTPATIENT)
Dept: FAMILY MEDICINE CLINIC | Facility: CLINIC | Age: 51
End: 2021-07-02

## 2021-07-02 VITALS
HEART RATE: 103 BPM | DIASTOLIC BLOOD PRESSURE: 92 MMHG | SYSTOLIC BLOOD PRESSURE: 160 MMHG | BODY MASS INDEX: 42.14 KG/M2 | OXYGEN SATURATION: 98 % | RESPIRATION RATE: 16 BRPM | WEIGHT: 229 LBS | TEMPERATURE: 98 F | HEIGHT: 62 IN

## 2021-07-02 DIAGNOSIS — Z12.31 ENCOUNTER FOR SCREENING MAMMOGRAM FOR MALIGNANT NEOPLASM OF BREAST: ICD-10-CM

## 2021-07-02 DIAGNOSIS — Z11.59 NEED FOR HEPATITIS C SCREENING TEST: ICD-10-CM

## 2021-07-02 DIAGNOSIS — Z13.29 THYROID DISORDER SCREEN: Primary | ICD-10-CM

## 2021-07-02 DIAGNOSIS — Z13.1 DIABETES MELLITUS SCREENING: ICD-10-CM

## 2021-07-02 DIAGNOSIS — I10 ESSENTIAL HYPERTENSION: ICD-10-CM

## 2021-07-02 DIAGNOSIS — Z11.4 ENCOUNTER FOR SCREENING FOR HIV: ICD-10-CM

## 2021-07-02 DIAGNOSIS — Z13.220 SCREENING CHOLESTEROL LEVEL: ICD-10-CM

## 2021-07-02 DIAGNOSIS — Z12.11 SCREEN FOR COLON CANCER: ICD-10-CM

## 2021-07-02 PROCEDURE — 99203 OFFICE O/P NEW LOW 30 MIN: CPT | Performed by: FAMILY MEDICINE

## 2021-07-02 PROCEDURE — 3725F SCREEN DEPRESSION PERFORMED: CPT | Performed by: FAMILY MEDICINE

## 2021-07-02 PROCEDURE — 3008F BODY MASS INDEX DOCD: CPT | Performed by: FAMILY MEDICINE

## 2021-07-02 PROCEDURE — 1036F TOBACCO NON-USER: CPT | Performed by: FAMILY MEDICINE

## 2021-07-02 RX ORDER — LISINOPRIL 5 MG/1
5 TABLET ORAL DAILY
Qty: 90 EACH | Refills: 1 | Status: SHIPPED | OUTPATIENT
Start: 2021-07-02 | End: 2021-12-28 | Stop reason: SDUPTHER

## 2021-07-02 NOTE — PROGRESS NOTES
Assessment/Plan:    Essential hypertension  /92 today  Repeat 154/90  Patient states she has previously been told she has high BP, but has never been on any medications for this  No headaches or blurry vision     - Will start patient on Lisinopril 5 mg daily  - CMP ordered to assess electrolytes  Diagnoses and all orders for this visit:    Thyroid disorder screen  -     TSH, 3rd generation with Free T4 reflex; Future    Encounter for screening for HIV  -     HIV 1/2 Antigen/Antibody (4th Generation) w Reflex [facility]; Future    Need for hepatitis C screening test  -     Hepatitis C antibody; Future    Screening cholesterol level  -     Comprehensive metabolic panel; Future  -     Lipid Panel with Direct LDL reflex; Future    Diabetes mellitus screening  -     HEMOGLOBIN A1C W/ EAG ESTIMATION; Future    Screen for colon cancer  -     Ambulatory referral to General Surgery; Future    Encounter for screening mammogram for malignant neoplasm of breast  -     Mammo screening bilateral w 3d & cad; Future    Essential hypertension  -     lisinopril (ZESTRIL) 5 mg tablet; Take 1 tablet (5 mg total) by mouth daily          Subjective:      Patient ID: Celina Garcia is a 45 y o  female  A pleasant 45year old female presents to the clinic for the first time today to establish a PCP  Following labs were ordered: TSH, lipid panel, CMP, HbA1C, as well as HIV and Hepatitis C for screening  A referral for colonoscopy and mammogram were also ordered  She will return for a pap smear as well  She has no complaints at this time and has received her COVID vaccine  The following portions of the patient's history were reviewed and updated as appropriate: allergies, current medications, past family history, past medical history, past social history, past surgical history and problem list     Review of Systems   Constitutional: Negative for activity change, appetite change, chills and fever  HENT: Negative for sore throat  Respiratory: Negative for cough and shortness of breath  Cardiovascular: Negative for chest pain, palpitations and leg swelling  Gastrointestinal: Negative for abdominal pain, constipation, diarrhea, nausea and vomiting  Musculoskeletal: Negative for back pain and gait problem  Neurological: Negative for dizziness, seizures, weakness and headaches  Objective:      /92 (BP Location: Left arm, Patient Position: Sitting, Cuff Size: Large)   Pulse 103   Temp 98 °F (36 7 °C) (Temporal)   Resp 16   Ht 5' 2" (1 575 m)   Wt 104 kg (229 lb)   SpO2 98%   BMI 41 88 kg/m²          Physical Exam  Vitals reviewed  Constitutional:       General: She is not in acute distress  Appearance: Normal appearance  She is well-developed  She is obese  She is not diaphoretic  HENT:      Head: Normocephalic and atraumatic  Eyes:      Conjunctiva/sclera: Conjunctivae normal    Cardiovascular:      Rate and Rhythm: Normal rate and regular rhythm  Heart sounds: Normal heart sounds  No murmur heard  No friction rub  No gallop  Pulmonary:      Effort: Pulmonary effort is normal  No respiratory distress  Breath sounds: Normal breath sounds  No wheezing or rales  Abdominal:      General: Bowel sounds are normal  There is no distension  Palpations: Abdomen is soft  There is no mass  Tenderness: There is no abdominal tenderness  There is no guarding  Musculoskeletal:         General: No tenderness or deformity  Normal range of motion  Cervical back: Normal range of motion  Right lower le+ Pitting Edema present  Left lower le+ Pitting Edema present  Skin:     General: Skin is warm and dry  Neurological:      Mental Status: She is alert and oriented to person, place, and time             Brandon Blake MD  2021

## 2021-07-02 NOTE — ASSESSMENT & PLAN NOTE
/92 today  Repeat 154/90  Patient states she has previously been told she has high BP, but has never been on any medications for this  No headaches or blurry vision     - Will start patient on Lisinopril 5 mg daily  - CMP ordered to assess electrolytes

## 2021-07-07 ENCOUNTER — LAB (OUTPATIENT)
Dept: LAB | Facility: CLINIC | Age: 51
End: 2021-07-07
Payer: COMMERCIAL

## 2021-07-07 DIAGNOSIS — Z13.220 SCREENING CHOLESTEROL LEVEL: ICD-10-CM

## 2021-07-07 DIAGNOSIS — Z13.29 THYROID DISORDER SCREEN: ICD-10-CM

## 2021-07-07 DIAGNOSIS — Z11.59 NEED FOR HEPATITIS C SCREENING TEST: ICD-10-CM

## 2021-07-07 DIAGNOSIS — Z13.1 DIABETES MELLITUS SCREENING: ICD-10-CM

## 2021-07-07 DIAGNOSIS — Z11.4 ENCOUNTER FOR SCREENING FOR HIV: ICD-10-CM

## 2021-07-07 LAB
ALBUMIN SERPL BCP-MCNC: 3.2 G/DL (ref 3.5–5)
ALP SERPL-CCNC: 138 U/L (ref 46–116)
ALT SERPL W P-5'-P-CCNC: 44 U/L (ref 12–78)
ANION GAP SERPL CALCULATED.3IONS-SCNC: 6 MMOL/L (ref 4–13)
AST SERPL W P-5'-P-CCNC: 31 U/L (ref 5–45)
BILIRUB SERPL-MCNC: 0.39 MG/DL (ref 0.2–1)
BUN SERPL-MCNC: 10 MG/DL (ref 5–25)
CALCIUM ALBUM COR SERPL-MCNC: 9.5 MG/DL (ref 8.3–10.1)
CALCIUM SERPL-MCNC: 8.9 MG/DL (ref 8.3–10.1)
CHLORIDE SERPL-SCNC: 107 MMOL/L (ref 100–108)
CHOLEST SERPL-MCNC: 176 MG/DL (ref 50–200)
CO2 SERPL-SCNC: 26 MMOL/L (ref 21–32)
CREAT SERPL-MCNC: 0.6 MG/DL (ref 0.6–1.3)
EST. AVERAGE GLUCOSE BLD GHB EST-MCNC: 108 MG/DL
GFR SERPL CREATININE-BSD FRML MDRD: 106 ML/MIN/1.73SQ M
GLUCOSE P FAST SERPL-MCNC: 93 MG/DL (ref 65–99)
HBA1C MFR BLD: 5.4 %
HCV AB SER QL: NORMAL
HDLC SERPL-MCNC: 48 MG/DL
LDLC SERPL CALC-MCNC: 90 MG/DL (ref 0–100)
POTASSIUM SERPL-SCNC: 3.9 MMOL/L (ref 3.5–5.3)
PROT SERPL-MCNC: 7.8 G/DL (ref 6.4–8.2)
SODIUM SERPL-SCNC: 139 MMOL/L (ref 136–145)
T4 FREE SERPL-MCNC: 0.98 NG/DL (ref 0.76–1.46)
TRIGL SERPL-MCNC: 190 MG/DL
TSH SERPL DL<=0.05 MIU/L-ACNC: 3.86 UIU/ML (ref 0.36–3.74)

## 2021-07-07 PROCEDURE — 87389 HIV-1 AG W/HIV-1&-2 AB AG IA: CPT

## 2021-07-07 PROCEDURE — 83036 HEMOGLOBIN GLYCOSYLATED A1C: CPT

## 2021-07-07 PROCEDURE — 86803 HEPATITIS C AB TEST: CPT

## 2021-07-07 PROCEDURE — 84439 ASSAY OF FREE THYROXINE: CPT

## 2021-07-07 PROCEDURE — 84443 ASSAY THYROID STIM HORMONE: CPT

## 2021-07-07 PROCEDURE — 80053 COMPREHEN METABOLIC PANEL: CPT

## 2021-07-07 PROCEDURE — 36415 COLL VENOUS BLD VENIPUNCTURE: CPT

## 2021-07-07 PROCEDURE — 80061 LIPID PANEL: CPT

## 2021-07-08 LAB — HIV 1+2 AB+HIV1 P24 AG SERPL QL IA: NORMAL

## 2021-07-09 NOTE — RESULT ENCOUNTER NOTE
Discussed results of all labs: CMP, lipid panel, TSH, T4, Hepatitis C, HIV, and HbA1C with daughter

## 2021-09-24 ENCOUNTER — ANNUAL EXAM (OUTPATIENT)
Dept: FAMILY MEDICINE CLINIC | Facility: CLINIC | Age: 51
End: 2021-09-24

## 2021-09-24 VITALS
SYSTOLIC BLOOD PRESSURE: 130 MMHG | BODY MASS INDEX: 41.52 KG/M2 | OXYGEN SATURATION: 97 % | TEMPERATURE: 97.8 F | WEIGHT: 227 LBS | HEART RATE: 86 BPM | RESPIRATION RATE: 19 BRPM | DIASTOLIC BLOOD PRESSURE: 82 MMHG

## 2021-09-24 DIAGNOSIS — Z12.4 ENCOUNTER FOR SCREENING FOR CERVICAL CANCER: Primary | ICD-10-CM

## 2021-09-24 PROCEDURE — 99213 OFFICE O/P EST LOW 20 MIN: CPT | Performed by: FAMILY MEDICINE

## 2021-09-24 PROCEDURE — G0476 HPV COMBO ASSAY CA SCREEN: HCPCS | Performed by: STUDENT IN AN ORGANIZED HEALTH CARE EDUCATION/TRAINING PROGRAM

## 2021-09-24 PROCEDURE — G0145 SCR C/V CYTO,THINLAYER,RESCR: HCPCS | Performed by: PATHOLOGY

## 2021-09-24 PROCEDURE — G0124 SCREEN C/V THIN LAYER BY MD: HCPCS | Performed by: PATHOLOGY

## 2021-09-24 NOTE — PROGRESS NOTES
ANNUAL GYNECOLOGICAL EXAMINATION:     Assessment:    Normal well-woman gynecological exam     Plan:    - Pap smear done with HPV co-testing reflex  - Will call patient with results  Irma Weaver is a 45 y o  female who presents today for her annual gynecology exam  Her last pap smear was performed around 16 years ago, and the result was normal   She reports no history of abnormal pap smears in her past  She uses no contraception and has had her tubes tied  Her general medical history has been reviewed and she reports it as follows:    History reviewed  No pertinent past medical history  History reviewed  No pertinent surgical history  OB History    No obstetric history on file  Social History     Tobacco Use    Smoking status: Never Smoker    Smokeless tobacco: Never Used   Substance Use Topics    Alcohol use: Never    Drug use: Never     Cancer-related family history is not on file  Current Outpatient Medications:     lisinopril (ZESTRIL) 5 mg tablet, Take 1 tablet (5 mg total) by mouth daily (Patient not taking: Reported on 9/24/2021), Disp: 90 each, Rfl: 1    Review of Systems:  Review of Systems   Constitutional: Negative for activity change, appetite change, chills and fever  HENT: Negative for sore throat  Respiratory: Negative for cough and shortness of breath  Cardiovascular: Negative for chest pain, palpitations and leg swelling  Gastrointestinal: Negative for abdominal pain, constipation, diarrhea, nausea and vomiting  Musculoskeletal: Negative for back pain and gait problem  Neurological: Negative for dizziness, seizures, weakness and headaches  Physical Exam:  Physical Exam  Vitals reviewed  Exam conducted with a chaperone present  Constitutional:       General: She is not in acute distress  Appearance: She is well-developed  She is not diaphoretic  HENT:      Head: Normocephalic and atraumatic     Eyes:      Conjunctiva/sclera: Conjunctivae normal  Cardiovascular:      Rate and Rhythm: Normal rate and regular rhythm  Heart sounds: Normal heart sounds  No murmur heard  No friction rub  No gallop  Pulmonary:      Effort: Pulmonary effort is normal  No respiratory distress  Breath sounds: Normal breath sounds  No wheezing or rales  Chest:      Chest wall: No mass, lacerations, swelling, tenderness or edema  Breasts: Barrie Score is 5  Right: Normal  No swelling, bleeding, inverted nipple, mass, nipple discharge, skin change or tenderness  Left: Normal  No swelling, bleeding, inverted nipple, mass, nipple discharge, skin change or tenderness  Abdominal:      General: Bowel sounds are normal  There is no distension  Palpations: Abdomen is soft  There is no mass  Tenderness: There is no abdominal tenderness  There is no guarding  Musculoskeletal:         General: No tenderness or deformity  Normal range of motion  Cervical back: Normal range of motion  Right lower leg: No edema  Left lower leg: No edema  Skin:     General: Skin is warm and dry  Neurological:      General: No focal deficit present  Mental Status: She is alert and oriented to person, place, and time           Antonio Decker MD  9/24/2021

## 2021-09-28 LAB
HPV HR 12 DNA CVX QL NAA+PROBE: NEGATIVE
HPV16 DNA CVX QL NAA+PROBE: NEGATIVE
HPV18 DNA CVX QL NAA+PROBE: NEGATIVE

## 2021-10-01 ENCOUNTER — TELEPHONE (OUTPATIENT)
Dept: FAMILY MEDICINE CLINIC | Facility: CLINIC | Age: 51
End: 2021-10-01

## 2021-10-01 LAB
LAB AP GYN PRIMARY INTERPRETATION: NORMAL
Lab: NORMAL
PATH INTERP SPEC-IMP: NORMAL

## 2021-12-28 ENCOUNTER — OFFICE VISIT (OUTPATIENT)
Dept: FAMILY MEDICINE CLINIC | Facility: CLINIC | Age: 51
End: 2021-12-28

## 2021-12-28 VITALS
HEIGHT: 62 IN | SYSTOLIC BLOOD PRESSURE: 138 MMHG | WEIGHT: 231.1 LBS | DIASTOLIC BLOOD PRESSURE: 86 MMHG | BODY MASS INDEX: 42.53 KG/M2 | OXYGEN SATURATION: 98 % | RESPIRATION RATE: 18 BRPM | HEART RATE: 86 BPM | TEMPERATURE: 98 F

## 2021-12-28 DIAGNOSIS — J02.9 SORE THROAT: ICD-10-CM

## 2021-12-28 DIAGNOSIS — Z23 FLU VACCINE NEED: ICD-10-CM

## 2021-12-28 DIAGNOSIS — R05.9 COUGH: ICD-10-CM

## 2021-12-28 DIAGNOSIS — I10 ESSENTIAL HYPERTENSION: Primary | ICD-10-CM

## 2021-12-28 PROBLEM — M60.9 MYOSITIS: Status: RESOLVED | Noted: 2019-11-20 | Resolved: 2021-12-28

## 2021-12-28 PROCEDURE — 90471 IMMUNIZATION ADMIN: CPT | Performed by: FAMILY MEDICINE

## 2021-12-28 PROCEDURE — 99213 OFFICE O/P EST LOW 20 MIN: CPT | Performed by: FAMILY MEDICINE

## 2021-12-28 PROCEDURE — 90682 RIV4 VACC RECOMBINANT DNA IM: CPT | Performed by: FAMILY MEDICINE

## 2021-12-28 RX ORDER — LISINOPRIL 5 MG/1
5 TABLET ORAL DAILY
Qty: 30 TABLET | Refills: 5 | Status: SHIPPED | OUTPATIENT
Start: 2021-12-28 | End: 2022-04-29 | Stop reason: SDUPTHER

## 2021-12-28 RX ORDER — BENZONATATE 100 MG/1
100 CAPSULE ORAL 3 TIMES DAILY PRN
Qty: 20 CAPSULE | Refills: 0 | Status: SHIPPED | OUTPATIENT
Start: 2021-12-28

## 2022-02-08 ENCOUNTER — APPOINTMENT (OUTPATIENT)
Dept: LAB | Facility: CLINIC | Age: 52
End: 2022-02-08
Payer: COMMERCIAL

## 2022-02-08 LAB
CREAT UR-MCNC: 274 MG/DL
MICROALBUMIN UR-MCNC: 22.9 MG/L (ref 0–20)
MICROALBUMIN/CREAT 24H UR: 8 MG/G CREATININE (ref 0–30)

## 2022-02-08 PROCEDURE — 82043 UR ALBUMIN QUANTITATIVE: CPT | Performed by: STUDENT IN AN ORGANIZED HEALTH CARE EDUCATION/TRAINING PROGRAM

## 2022-02-08 PROCEDURE — 82570 ASSAY OF URINE CREATININE: CPT | Performed by: STUDENT IN AN ORGANIZED HEALTH CARE EDUCATION/TRAINING PROGRAM

## 2022-04-29 ENCOUNTER — OFFICE VISIT (OUTPATIENT)
Dept: FAMILY MEDICINE CLINIC | Facility: CLINIC | Age: 52
End: 2022-04-29

## 2022-04-29 VITALS
WEIGHT: 233.1 LBS | HEART RATE: 83 BPM | SYSTOLIC BLOOD PRESSURE: 128 MMHG | TEMPERATURE: 97.6 F | OXYGEN SATURATION: 99 % | DIASTOLIC BLOOD PRESSURE: 70 MMHG | RESPIRATION RATE: 18 BRPM | HEIGHT: 62 IN | BODY MASS INDEX: 42.89 KG/M2

## 2022-04-29 DIAGNOSIS — I10 ESSENTIAL HYPERTENSION: Primary | ICD-10-CM

## 2022-04-29 DIAGNOSIS — Z23 ENCOUNTER FOR IMMUNIZATION: ICD-10-CM

## 2022-04-29 DIAGNOSIS — N89.8 VAGINAL ITCHING: ICD-10-CM

## 2022-04-29 PROCEDURE — 99213 OFFICE O/P EST LOW 20 MIN: CPT | Performed by: FAMILY MEDICINE

## 2022-04-29 PROCEDURE — 90471 IMMUNIZATION ADMIN: CPT | Performed by: FAMILY MEDICINE

## 2022-04-29 PROCEDURE — 90715 TDAP VACCINE 7 YRS/> IM: CPT | Performed by: FAMILY MEDICINE

## 2022-04-29 RX ORDER — MENTHOL 4.5 MG/1
1 LOZENGE ORAL DAILY PRN
Qty: 60 G | Refills: 1 | Status: SHIPPED | OUTPATIENT
Start: 2022-04-29

## 2022-04-29 RX ORDER — LISINOPRIL 5 MG/1
5 TABLET ORAL DAILY
Qty: 30 TABLET | Refills: 5 | Status: SHIPPED | OUTPATIENT
Start: 2022-04-29 | End: 2022-08-04 | Stop reason: SDUPTHER

## 2022-04-29 NOTE — ASSESSMENT & PLAN NOTE
BP Today: 128/70  At goal per JNC-8 guidelines  CMP within normal limits  Urine microalbumin/creatinine mildly elevated at 22 9     - Continue Lisinopril 5 mg daily   - Patient counseled on low salt diet

## 2022-04-29 NOTE — PROGRESS NOTES
Assessment/Plan:    Essential hypertension  BP Today: 128/70  At goal per JNC-8 guidelines  CMP within normal limits  Urine microalbumin/creatinine mildly elevated at 22 9     - Continue Lisinopril 5 mg daily   - Patient counseled on low salt diet  Health Maintenance:    - Patient is due for TdaP vaccine and received it in the office today  - Patient also due for colonoscopy; order was placed at prior visit but patient has not yet scheduled this  - Patient also due for mammogram and has not yet had this done  Diagnoses and all orders for this visit:    Essential hypertension  -     lisinopril (ZESTRIL) 5 mg tablet; Take 1 tablet (5 mg total) by mouth daily    Encounter for immunization  -     TDAP VACCINE GREATER THAN OR EQUAL TO 8YO IM    Vaginal itching  -     Benzocaine-Resorcinol (Vagisil) 5-2 % CREA; Apply 1 application topically daily as needed (vaginal itching)          Subjective:      Patient ID: Elisa Biswas is a 45 y o  female  A pleasant 45year old female with PMH of HTN presents to the clinic for a follow up on her conditions  Patient's medical conditions are stable unless noted otherwise above  Patient has not had any recent hospitalizations, or medical emergencies since last visit  Patient has no further complaints other than what is mentioned in the review of systems      The following portions of the patient's history were reviewed and updated as appropriate: allergies, current medications, past family history, past medical history, past social history, past surgical history and problem list     Review of Systems   Constitutional: Negative for activity change, appetite change, chills and fever  HENT: Negative for sore throat  Respiratory: Negative for cough and shortness of breath  Cardiovascular: Negative for chest pain, palpitations and leg swelling  Gastrointestinal: Negative for abdominal pain, constipation, diarrhea, nausea and vomiting  Musculoskeletal: Negative for back pain and gait problem  Neurological: Negative for dizziness, seizures, weakness and headaches  Objective:      /70 (BP Location: Left arm, Patient Position: Sitting, Cuff Size: Adult)   Pulse 83   Temp 97 6 °F (36 4 °C) (Temporal)   Resp 18   Ht 5' 2" (1 575 m)   Wt 106 kg (233 lb 1 6 oz)   LMP 02/26/2022   SpO2 99%   BMI 42 63 kg/m²          Physical Exam  Vitals reviewed  Constitutional:       General: She is not in acute distress  Appearance: She is well-developed  She is obese  She is not diaphoretic  HENT:      Head: Normocephalic and atraumatic  Eyes:      Conjunctiva/sclera: Conjunctivae normal    Cardiovascular:      Rate and Rhythm: Normal rate and regular rhythm  Heart sounds: Normal heart sounds  No murmur heard  No friction rub  No gallop  Pulmonary:      Effort: Pulmonary effort is normal  No respiratory distress  Breath sounds: Normal breath sounds  No wheezing or rales  Abdominal:      General: Bowel sounds are normal  There is no distension  Palpations: Abdomen is soft  There is no mass  Tenderness: There is no abdominal tenderness  There is no guarding  Musculoskeletal:         General: No tenderness or deformity  Normal range of motion  Cervical back: Normal range of motion  Right lower leg: No edema  Left lower leg: No edema  Skin:     General: Skin is warm and dry  Neurological:      General: No focal deficit present  Mental Status: She is alert and oriented to person, place, and time             Kimberly Conti MD  4/29/2022

## 2022-08-04 ENCOUNTER — OFFICE VISIT (OUTPATIENT)
Dept: FAMILY MEDICINE CLINIC | Facility: CLINIC | Age: 52
End: 2022-08-04

## 2022-08-04 VITALS
SYSTOLIC BLOOD PRESSURE: 126 MMHG | HEIGHT: 62 IN | DIASTOLIC BLOOD PRESSURE: 80 MMHG | HEART RATE: 80 BPM | BODY MASS INDEX: 43.13 KG/M2 | RESPIRATION RATE: 16 BRPM | TEMPERATURE: 97.9 F | OXYGEN SATURATION: 97 % | WEIGHT: 234.4 LBS

## 2022-08-04 DIAGNOSIS — D22.9 BENIGN MOLE: ICD-10-CM

## 2022-08-04 DIAGNOSIS — G47.33 OSA (OBSTRUCTIVE SLEEP APNEA): ICD-10-CM

## 2022-08-04 DIAGNOSIS — I10 ESSENTIAL HYPERTENSION: ICD-10-CM

## 2022-08-04 DIAGNOSIS — E66.09 OBESITY DUE TO EXCESS CALORIES, UNSPECIFIED CLASSIFICATION, UNSPECIFIED WHETHER SERIOUS COMORBIDITY PRESENT: ICD-10-CM

## 2022-08-04 DIAGNOSIS — Z12.31 ENCOUNTER FOR SCREENING MAMMOGRAM FOR BREAST CANCER: Primary | ICD-10-CM

## 2022-08-04 DIAGNOSIS — Z12.11 COLON CANCER SCREENING: ICD-10-CM

## 2022-08-04 PROCEDURE — 3725F SCREEN DEPRESSION PERFORMED: CPT | Performed by: FAMILY MEDICINE

## 2022-08-04 PROCEDURE — 99213 OFFICE O/P EST LOW 20 MIN: CPT | Performed by: FAMILY MEDICINE

## 2022-08-04 RX ORDER — LISINOPRIL 5 MG/1
5 TABLET ORAL DAILY
Qty: 30 TABLET | Refills: 5 | Status: SHIPPED | OUTPATIENT
Start: 2022-08-04

## 2022-08-04 NOTE — ASSESSMENT & PLAN NOTE
BP Today: 126/80  At goal per JNC-8 guidelines  CMP within normal limits  Urine microalbumin/creatinine ratio from 2/2022 within normal limits at 22 9    - Continue Lisinopril 5 mg daily   - Patient counseled on low salt diet as well as exercise  - Repeat CMP ordered

## 2022-08-04 NOTE — PROGRESS NOTES
Assessment/Plan:    Essential hypertension  BP Today: 126/80  At goal per JNC-8 guidelines  CMP within normal limits  Urine microalbumin/creatinine ratio from 2/2022 within normal limits at 22 9    - Continue Lisinopril 5 mg daily   - Patient counseled on low salt diet as well as exercise  - Repeat CMP ordered  Health Maintenance:    - Patient also due for colonoscopy; order was placed at prior visit but patient has not yet scheduled this  Repeat ordered and patient counseled to schedule this  Number given  - Patient also due for mammogram and has not yet had this done  Repeat ordered today  - Repeat lipid panel ordered  Suspected NELLI:  Patient at high risk with snoring, daytime fatigue, HTN diagnosis, BMI>35, and age >47     - Sleep study ordered as well as referral to sleep medicine  Diagnoses and all orders for this visit:    Encounter for screening mammogram for breast cancer  -     Mammo screening bilateral w 3d & cad; Future    Benign mole  -     Ambulatory Referral to Plastic Surgery; Future    Colon cancer screening  -     Ambulatory Referral to Gastroenterology; Future    Essential hypertension  -     Comprehensive metabolic panel; Future  -     lisinopril (ZESTRIL) 5 mg tablet; Take 1 tablet (5 mg total) by mouth daily    Obesity due to excess calories, unspecified classification, unspecified whether serious comorbidity present  -     Lipid Panel with Direct LDL reflex; Future    NELLI (obstructive sleep apnea)  -     Diagnostic Sleep Study; Future          Subjective:      Patient ID: Roz Lanier is a 45 y o  female  A pleasant 45year old female with PMH of HTN presents to the clinic for a follow up on her conditions  Patient's medical conditions are stable unless noted otherwise above  Patient has not had any recent hospitalizations, or medical emergencies since last visit    Patient has no further complaints other than what is mentioned in the review of systems  The following portions of the patient's history were reviewed and updated as appropriate: allergies, current medications, past family history, past medical history, past social history, past surgical history and problem list     Review of Systems   Constitutional: Negative for activity change, appetite change, chills and fever  HENT: Negative for sore throat  Respiratory: Negative for cough and shortness of breath  Cardiovascular: Negative for chest pain, palpitations and leg swelling  Gastrointestinal: Negative for abdominal pain, constipation, diarrhea, nausea and vomiting  Musculoskeletal: Negative for back pain and gait problem  Neurological: Negative for dizziness, seizures, weakness and headaches  Objective:      /80 (BP Location: Left arm, Patient Position: Sitting, Cuff Size: Adult)   Pulse 80   Temp 97 9 °F (36 6 °C) (Temporal)   Resp 16   Ht 5' 2" (1 575 m)   Wt 106 kg (234 lb 6 4 oz)   SpO2 97%   BMI 42 87 kg/m²        Physical Exam  Vitals reviewed  Constitutional:       General: She is not in acute distress  Appearance: She is well-developed  She is obese  She is not diaphoretic  HENT:      Head: Normocephalic and atraumatic  Eyes:      Conjunctiva/sclera: Conjunctivae normal    Cardiovascular:      Rate and Rhythm: Normal rate and regular rhythm  Heart sounds: Normal heart sounds  No murmur heard  No friction rub  No gallop  Pulmonary:      Effort: Pulmonary effort is normal  No respiratory distress  Breath sounds: Normal breath sounds  No wheezing or rales  Abdominal:      General: Bowel sounds are normal  There is no distension  Palpations: Abdomen is soft  There is no mass  Tenderness: There is no abdominal tenderness  There is no guarding  Musculoskeletal:         General: No tenderness or deformity  Normal range of motion  Cervical back: Normal range of motion  Right lower leg: No edema        Left lower leg: No edema  Skin:     General: Skin is warm and dry  Neurological:      General: No focal deficit present  Mental Status: She is alert and oriented to person, place, and time             Venus Stephens MD  8/4/2022

## 2022-08-05 ENCOUNTER — LAB (OUTPATIENT)
Dept: LAB | Facility: CLINIC | Age: 52
End: 2022-08-05
Payer: COMMERCIAL

## 2022-08-05 DIAGNOSIS — E66.09 OBESITY DUE TO EXCESS CALORIES, UNSPECIFIED CLASSIFICATION, UNSPECIFIED WHETHER SERIOUS COMORBIDITY PRESENT: ICD-10-CM

## 2022-08-05 DIAGNOSIS — I10 ESSENTIAL HYPERTENSION: ICD-10-CM

## 2022-08-05 LAB
ALBUMIN SERPL BCP-MCNC: 3.5 G/DL (ref 3.5–5)
ALP SERPL-CCNC: 133 U/L (ref 46–116)
ALT SERPL W P-5'-P-CCNC: 63 U/L (ref 12–78)
ANION GAP SERPL CALCULATED.3IONS-SCNC: 3 MMOL/L (ref 4–13)
AST SERPL W P-5'-P-CCNC: 39 U/L (ref 5–45)
BILIRUB SERPL-MCNC: 0.56 MG/DL (ref 0.2–1)
BUN SERPL-MCNC: 11 MG/DL (ref 5–25)
CALCIUM SERPL-MCNC: 9 MG/DL (ref 8.3–10.1)
CHLORIDE SERPL-SCNC: 109 MMOL/L (ref 96–108)
CHOLEST SERPL-MCNC: 154 MG/DL
CO2 SERPL-SCNC: 28 MMOL/L (ref 21–32)
CREAT SERPL-MCNC: 0.71 MG/DL (ref 0.6–1.3)
GFR SERPL CREATININE-BSD FRML MDRD: 98 ML/MIN/1.73SQ M
GLUCOSE P FAST SERPL-MCNC: 89 MG/DL (ref 65–99)
HDLC SERPL-MCNC: 43 MG/DL
LDLC SERPL CALC-MCNC: 90 MG/DL (ref 0–100)
POTASSIUM SERPL-SCNC: 3.7 MMOL/L (ref 3.5–5.3)
PROT SERPL-MCNC: 7.9 G/DL (ref 6.4–8.4)
SODIUM SERPL-SCNC: 140 MMOL/L (ref 135–147)
TRIGL SERPL-MCNC: 107 MG/DL

## 2022-08-05 PROCEDURE — 80053 COMPREHEN METABOLIC PANEL: CPT

## 2022-08-05 PROCEDURE — 36415 COLL VENOUS BLD VENIPUNCTURE: CPT

## 2022-08-05 PROCEDURE — 80061 LIPID PANEL: CPT

## 2022-08-08 NOTE — RESULT ENCOUNTER NOTE
Please call the patient and inform her that her kidney and liver function are stable  Her cholesterol levels have improved from 1 year ago  Patient is Turkmen speaking  Thank you!

## 2022-08-11 ENCOUNTER — OFFICE VISIT (OUTPATIENT)
Dept: DENTISTRY | Facility: CLINIC | Age: 52
End: 2022-08-11

## 2022-08-11 VITALS — DIASTOLIC BLOOD PRESSURE: 93 MMHG | SYSTOLIC BLOOD PRESSURE: 139 MMHG | TEMPERATURE: 98.9 F | HEART RATE: 79 BPM

## 2022-08-11 DIAGNOSIS — Z01.20 ENCOUNTER FOR DENTAL EXAMINATION: Primary | ICD-10-CM

## 2022-08-11 PROCEDURE — D0150 COMPREHENSIVE ORAL EVALUATION - NEW OR ESTABLISHED PATIENT: HCPCS | Performed by: DENTIST

## 2022-08-11 PROCEDURE — D1110 PROPHYLAXIS - ADULT: HCPCS

## 2022-08-11 PROCEDURE — D0230 INTRAORAL - PERIAPICAL EACH ADDITIONAL RADIOGRAPHIC IMAGE: HCPCS

## 2022-08-11 NOTE — PROGRESS NOTES
NP - Adult Prophy    ASA II  Pain:  None  Reviewed M/DH     Exams:  Comprehensive Exam  and perio charted; pt interested in PLD (request sent for pre-auth)  Xrays:    Periapical's of remaining lower teeth   Type of Treatment:  Adult Prophy - Hand scaling,  Polished, Flossed  Reviewed OHI  Brush:  2X/day and Floss 1X/day  Recommended Listerine    EO/OCS Exams:  No significant findings  IO: No significant findings  Occlusion: Maxillary - edentuluous  Oral Hygiene:  Good   Plaque:  Light   Calculus:  Light    Bleeding:  Light    Gingiva:  Pink and  Firm  Stain:  Light    Perio Charting:  Periocharting was completed and evaluated   1-3  mm w/ slight BUP    Perio Findings:  Slight generalized gingivitis  Caries Findings:  #20 and 21 - MO  Treatment Plan:  Updated    Dr  Exam:  Dr Bashir Hansen  Referral:  No referral given   NV:  Rest #20 and 21 - MO  NVV:  Ext #22  NVVV:  Impressions for PLD - if approved  NVVVV:  6 MRC - due 02/2023

## 2022-08-18 ENCOUNTER — OFFICE VISIT (OUTPATIENT)
Dept: DENTISTRY | Facility: CLINIC | Age: 52
End: 2022-08-18

## 2022-08-18 VITALS — DIASTOLIC BLOOD PRESSURE: 82 MMHG | SYSTOLIC BLOOD PRESSURE: 136 MMHG | TEMPERATURE: 97.5 F | HEART RATE: 91 BPM

## 2022-08-18 DIAGNOSIS — K02.9 CARIES: Primary | ICD-10-CM

## 2022-08-18 PROCEDURE — D2392 RESIN-BASED COMPOSITE - 2 SURFACES, POSTERIOR: HCPCS | Performed by: DENTIST

## 2022-08-18 NOTE — PROGRESS NOTES
Composite Filling    Sedrick Horowitz presents for composite #20MO and #21MO composite filling  PMH reviewed, no changes  Patient spoke Irish so I communicated with her in [address] but her daughter was also in the room to help translate  ASA type 2  Pain level none  Discussed with patient need for RCT if pulp exposure occurs or in future if pulp is inflamed  Pt understands and consents  Applied topical benzocaine, administered 1 carps 2% lido 1:100k epi via mental nerve block of left side  Tooth 20 was rotated buccally  (Mesial cusp was towards buccal)  Prepped tooth #20 with 245 carbide on high speed  Caries removed with round carbide on slow speed  Caries was deep and was close to pulp  Deepest caries was left but walls were cleaned  Placed limelite on pulpal floor and light cured  Isolation with cotton rolls and dri-angles  Matrix was not needed since caries was not on contact  Etch with 37% H2PO4, rinse, dry  Applied gluma for 10 sec, rinsed and air dried  Applied Adhese with 20 second scrub once, gentle air dry and light cured for 10s  Restored with Tetric bulk joao shade A2 and light cured  Prepped tooth #21 MO with 245 carbide on high speed  Caries removed with round carbide on slow speed  Placed tofflemire matrix  Isolation with cotton rolls and dri-angles  Etch with 37% H2PO4, rinse, dry  Applied Adhese with 20 second scrub once, gentle air dry and light cured for 10s  Restored with Tetric bulk joao shade A2 and light cured  Refined with finishing burs, polished with enhance point  Verified occlusion and contacts  Pt left satisfied and ambulatory      N V: EXT #22

## 2022-11-04 PROBLEM — E78.2 MODERATE MIXED HYPERLIPIDEMIA NOT REQUIRING STATIN THERAPY: Status: ACTIVE | Noted: 2022-11-04

## 2022-11-04 NOTE — PROGRESS NOTES
Assessment/Plan:    Essential hypertension  BP Today: 126/80  At goal per JNC-8 guidelines  CMP from 8/2022 within normal limits  Urine microalbumin/creatinine ratio from 2/2022 within normal limits at 8     - Continue Lisinopril 5 mg daily   - Patient counseled on low salt diet as well as exercise  Moderate mixed hyperlipidemia not requiring statin therapy  Lipid Panel from 8/2022: , , HDL 43, LDL 90  No indication for a statin at this time  - Patient counseled on lifestyle modifications  NELLI (obstructive sleep apnea)  Patient at high risk with snoring, daytime fatigue, HTN diagnosis, BMI>35, and age >47     - Sleep study ordered as well as referral to sleep medicine at our last visit- patient has not yet scheduled this and was advised to do so  Discussed the risks of untreated sleep apnea with both patient and daughter  Health Maintenance:    - Flu vaccine given today  - Shingrix vaccine ordered (both doses), printed out, signed and given back to patient and advised to go to pharmacy  - Patient also due for colonoscopy; order was placed at prior visit but patient has not yet scheduled this  Repeat ordered and patient counseled to schedule this  Number given  - Patient also due for mammogram and has not yet had this done  Repeat ordered today  Diagnoses and all orders for this visit:    Moderate mixed hyperlipidemia not requiring statin therapy    Essential hypertension  -     lisinopril (ZESTRIL) 5 mg tablet; Take 1 tablet (5 mg total) by mouth daily    Encounter for immunization  -     influenza vaccine, quadrivalent, recombinant, PF, 0 5 mL, for patients 18 yr+ (FLUBLOK)  -     Zoster Vac Recomb Adjuvanted (Shingrix) 50 MCG/0 5ML SUSR; Inject 0 5 mL into a muscle once for 1 dose Repeat dose in 2 to 6 months    NELLI (obstructive sleep apnea)          Subjective:      Patient ID: Lo Neil is a 45 y o  female      A very pleasant 45year old female with PMH of HTN and obesity presents to the clinic for a follow up on her conditions  Patient's medical conditions are stable unless noted otherwise above  Patient has not had any recent hospitalizations, or medical emergencies since last visit  Patient has no further complaints other than what is mentioned in the review of systems  The following portions of the patient's history were reviewed and updated as appropriate: allergies, current medications, past family history, past medical history, past social history, past surgical history and problem list     Review of Systems   Constitutional: Negative for activity change, appetite change, chills and fever  HENT: Negative for sore throat  Respiratory: Negative for cough and shortness of breath  Cardiovascular: Negative for chest pain, palpitations and leg swelling  Gastrointestinal: Negative for abdominal pain, constipation, diarrhea, nausea and vomiting  Musculoskeletal: Negative for back pain and gait problem  Neurological: Negative for dizziness, seizures, weakness and headaches  Objective:      /70 (BP Location: Left arm, Patient Position: Sitting, Cuff Size: Large)   Pulse 80   Temp (!) 96 8 °F (36 °C) (Temporal)   Resp 18   Ht 5' 2" (1 575 m)   Wt 107 kg (236 lb)   SpO2 97%   BMI 43 16 kg/m²        Physical Exam  Vitals reviewed  Constitutional:       General: She is not in acute distress  Appearance: She is well-developed  She is obese  She is not diaphoretic  HENT:      Head: Normocephalic and atraumatic  Eyes:      Conjunctiva/sclera: Conjunctivae normal    Cardiovascular:      Rate and Rhythm: Normal rate and regular rhythm  Heart sounds: Normal heart sounds  No murmur heard  No friction rub  No gallop  Pulmonary:      Effort: Pulmonary effort is normal  No respiratory distress  Breath sounds: Normal breath sounds  No wheezing or rales  Abdominal:      General: Bowel sounds are normal  There is no distension  Palpations: Abdomen is soft  There is no mass  Tenderness: There is no abdominal tenderness  There is no guarding  Musculoskeletal:         General: No tenderness or deformity  Normal range of motion  Cervical back: Normal range of motion  Right lower leg: No edema  Left lower leg: No edema  Skin:     General: Skin is warm and dry  Neurological:      General: No focal deficit present  Mental Status: She is alert and oriented to person, place, and time             Gifty Lopez MD  11/7/2022

## 2022-11-04 NOTE — ASSESSMENT & PLAN NOTE
Lipid Panel from 8/2022: , , HDL 43, LDL 90  No indication for a statin at this time  - Patient counseled on lifestyle modifications

## 2022-11-04 NOTE — ASSESSMENT & PLAN NOTE
BP Today: 126/80  At goal per JNC-8 guidelines  CMP from 8/2022 within normal limits  Urine microalbumin/creatinine ratio from 2/2022 within normal limits at 8     - Continue Lisinopril 5 mg daily   - Patient counseled on low salt diet as well as exercise

## 2022-11-07 ENCOUNTER — OFFICE VISIT (OUTPATIENT)
Dept: FAMILY MEDICINE CLINIC | Facility: CLINIC | Age: 52
End: 2022-11-07

## 2022-11-07 VITALS
RESPIRATION RATE: 18 BRPM | HEART RATE: 80 BPM | TEMPERATURE: 96.8 F | SYSTOLIC BLOOD PRESSURE: 122 MMHG | DIASTOLIC BLOOD PRESSURE: 70 MMHG | BODY MASS INDEX: 43.43 KG/M2 | OXYGEN SATURATION: 97 % | WEIGHT: 236 LBS | HEIGHT: 62 IN

## 2022-11-07 DIAGNOSIS — I10 ESSENTIAL HYPERTENSION: ICD-10-CM

## 2022-11-07 DIAGNOSIS — E78.2 MODERATE MIXED HYPERLIPIDEMIA NOT REQUIRING STATIN THERAPY: Primary | ICD-10-CM

## 2022-11-07 DIAGNOSIS — Z23 ENCOUNTER FOR IMMUNIZATION: ICD-10-CM

## 2022-11-07 DIAGNOSIS — G47.33 OSA (OBSTRUCTIVE SLEEP APNEA): ICD-10-CM

## 2022-11-07 RX ORDER — ZOSTER VACCINE RECOMBINANT, ADJUVANTED 50 MCG/0.5
0.5 KIT INTRAMUSCULAR ONCE
Qty: 1 EACH | Refills: 1 | Status: SHIPPED | OUTPATIENT
Start: 2022-11-07 | End: 2022-11-07

## 2022-11-07 RX ORDER — LISINOPRIL 5 MG/1
5 TABLET ORAL DAILY
Qty: 30 TABLET | Refills: 5 | Status: SHIPPED | OUTPATIENT
Start: 2022-11-07

## 2022-11-07 NOTE — ASSESSMENT & PLAN NOTE
Patient at high risk with snoring, daytime fatigue, HTN diagnosis, BMI>35, and age >47     - Sleep study ordered as well as referral to sleep medicine at our last visit- patient has not yet scheduled this and was advised to do so  Discussed the risks of untreated sleep apnea with both patient and daughter

## 2022-11-23 ENCOUNTER — PREP FOR PROCEDURE (OUTPATIENT)
Dept: GASTROENTEROLOGY | Facility: CLINIC | Age: 52
End: 2022-11-23

## 2022-11-23 ENCOUNTER — TELEPHONE (OUTPATIENT)
Dept: GASTROENTEROLOGY | Facility: CLINIC | Age: 52
End: 2022-11-23

## 2022-11-23 DIAGNOSIS — Z12.11 SCREENING FOR COLON CANCER: Primary | ICD-10-CM

## 2022-11-23 NOTE — TELEPHONE ENCOUNTER
Please reach out to pt's daughter, Emile Koch, in regards to pt's prep info for her colonoscopy as pt does not speak english  Emile Swannsaludkellee can be reached at [phone]  Procedure is scheduled for 1/11/2023

## 2022-11-23 NOTE — TELEPHONE ENCOUNTER
PT PASSED OA      Scheduled date of colonoscopy (as of today): 1/11/2023    Physician performing colonoscopy: Dr Ara Luque    Location of colonoscopy: [place]    Clearances: N/A

## 2022-11-25 DIAGNOSIS — Z12.11 SCREENING FOR COLON CANCER: Primary | ICD-10-CM

## 2022-11-27 DIAGNOSIS — Z12.11 SCREENING FOR COLON CANCER: Primary | ICD-10-CM

## 2022-12-08 ENCOUNTER — TELEPHONE (OUTPATIENT)
Dept: GASTROENTEROLOGY | Facility: CLINIC | Age: 52
End: 2022-12-08

## 2022-12-08 NOTE — TELEPHONE ENCOUNTER
rcvd fax from [facility] pt needs prior auth for Go lightly   Routing to [facility]

## 2022-12-09 ENCOUNTER — TELEPHONE (OUTPATIENT)
Dept: GASTROENTEROLOGY | Facility: MEDICAL CENTER | Age: 52
End: 2022-12-09

## 2022-12-09 NOTE — TELEPHONE ENCOUNTER
Send out [place] prep for pt she can purchased  over the counter prep-we do not do prior authorization for FITO Araya

## 2022-12-10 ENCOUNTER — HOSPITAL ENCOUNTER (OUTPATIENT)
Dept: MAMMOGRAPHY | Facility: CLINIC | Age: 52
Discharge: HOME | End: 2022-12-10

## 2022-12-10 DIAGNOSIS — Z12.31 ENCOUNTER FOR SCREENING MAMMOGRAM FOR BREAST CANCER: ICD-10-CM

## 2022-12-13 ENCOUNTER — OFFICE VISIT (OUTPATIENT)
Dept: DENTISTRY | Facility: CLINIC | Age: 52
End: 2022-12-13

## 2022-12-13 VITALS — DIASTOLIC BLOOD PRESSURE: 91 MMHG | TEMPERATURE: 97.8 F | SYSTOLIC BLOOD PRESSURE: 161 MMHG | HEART RATE: 74 BPM

## 2022-12-13 DIAGNOSIS — K04.7 DENTAL ABSCESS: Primary | ICD-10-CM

## 2022-12-13 NOTE — PROGRESS NOTES
Pt presents for extraction of #22  Verbal consent for treatment given in addition to the forms  Reviewed health history - Patient is ASA II  Consents signed: Yes     Perio: Slight bleeding  Pain Scale: 0  Caries Assessment: HIGH  Radiographs: Up to date           Tooth #22 is   severly decayed and missing crown     Explained benefits and risks of procedure to pt, pt understands and consents to treatment  Signed and uploaded ext consent form to doc center  Applied topical benzocaine, administered 2 carps of 4% septo w/ 1:100,000 via ,   tooth #22  luxated with straight elevators and sectioned MD and removed with forceps  Placed gauze soaked in NS and provided verbal and writtenPt tolerated procedure well, left satisfied and ambulatory  Prognosis is Good  NV with Dr Elinor Hodgkins for fabrication of removable prosthesis

## 2022-12-14 NOTE — RESULT ENCOUNTER NOTE
Please call patient and inform her that she has some asymmetry in her right breast, but that the imaging of her left breast is normal  She will require further imaging of her right breast  Please let her know someone will be calling her to schedule further imaging and for her to keep her phone close so she can answer this  Patient is Libyan speaking- thank you!

## 2023-01-09 DIAGNOSIS — I10 ESSENTIAL HYPERTENSION: ICD-10-CM

## 2023-01-09 RX ORDER — LISINOPRIL 5 MG/1
TABLET ORAL
Qty: 90 TABLET | Refills: 1 | Status: SHIPPED | OUTPATIENT
Start: 2023-01-09

## 2023-01-10 RX ORDER — SODIUM CHLORIDE, SODIUM LACTATE, POTASSIUM CHLORIDE, CALCIUM CHLORIDE 600; 310; 30; 20 MG/100ML; MG/100ML; MG/100ML; MG/100ML
50 INJECTION, SOLUTION INTRAVENOUS CONTINUOUS
Status: CANCELLED | OUTPATIENT
Start: 2023-01-10

## 2023-01-11 ENCOUNTER — HOSPITAL ENCOUNTER (OUTPATIENT)
Dept: GASTROENTEROLOGY | Facility: HOSPITAL | Age: 53
Setting detail: OUTPATIENT SURGERY
Discharge: HOME | End: 2023-01-11
Attending: INTERNAL MEDICINE

## 2023-01-11 ENCOUNTER — ANESTHESIA (OUTPATIENT)
Dept: GASTROENTEROLOGY | Facility: HOSPITAL | Age: 53
End: 2023-01-11

## 2023-01-11 ENCOUNTER — ANESTHESIA EVENT (OUTPATIENT)
Dept: GASTROENTEROLOGY | Facility: HOSPITAL | Age: 53
End: 2023-01-11

## 2023-01-11 VITALS
WEIGHT: 236 LBS | HEIGHT: 62 IN | TEMPERATURE: 97.2 F | RESPIRATION RATE: 16 BRPM | SYSTOLIC BLOOD PRESSURE: 108 MMHG | OXYGEN SATURATION: 97 % | DIASTOLIC BLOOD PRESSURE: 72 MMHG | BODY MASS INDEX: 43.43 KG/M2 | HEART RATE: 66 BPM

## 2023-01-11 DIAGNOSIS — Z12.11 SCREENING FOR COLON CANCER: ICD-10-CM

## 2023-01-11 RX ORDER — LIDOCAINE HYDROCHLORIDE 10 MG/ML
INJECTION, SOLUTION EPIDURAL; INFILTRATION; INTRACAUDAL; PERINEURAL AS NEEDED
Status: DISCONTINUED | OUTPATIENT
Start: 2023-01-11 | End: 2023-01-11

## 2023-01-11 RX ORDER — PROPOFOL 10 MG/ML
INJECTION, EMULSION INTRAVENOUS AS NEEDED
Status: DISCONTINUED | OUTPATIENT
Start: 2023-01-11 | End: 2023-01-11

## 2023-01-11 RX ORDER — SODIUM CHLORIDE, SODIUM LACTATE, POTASSIUM CHLORIDE, CALCIUM CHLORIDE 600; 310; 30; 20 MG/100ML; MG/100ML; MG/100ML; MG/100ML
50 INJECTION, SOLUTION INTRAVENOUS CONTINUOUS
Status: DISCONTINUED | OUTPATIENT
Start: 2023-01-11 | End: 2023-01-15 | Stop reason: HOSPADM

## 2023-01-11 RX ORDER — PROPOFOL 10 MG/ML
INJECTION, EMULSION INTRAVENOUS CONTINUOUS PRN
Status: DISCONTINUED | OUTPATIENT
Start: 2023-01-11 | End: 2023-01-11

## 2023-01-11 RX ADMIN — SODIUM CHLORIDE, SODIUM LACTATE, POTASSIUM CHLORIDE, AND CALCIUM CHLORIDE 50 ML/HR: 600; 310; 30; 20 INJECTION, SOLUTION INTRAVENOUS at 08:24

## 2023-01-11 RX ADMIN — LIDOCAINE HYDROCHLORIDE 50 MG: 10 INJECTION, SOLUTION EPIDURAL; INFILTRATION; INTRACAUDAL; PERINEURAL at 09:45

## 2023-01-11 RX ADMIN — PROPOFOL 100 MG: 10 INJECTION, EMULSION INTRAVENOUS at 09:45

## 2023-01-11 RX ADMIN — PROPOFOL 120 MCG/KG/MIN: 10 INJECTION, EMULSION INTRAVENOUS at 09:45

## 2023-01-11 NOTE — ANESTHESIA PREPROCEDURE EVALUATION
Procedure:  COLONOSCOPY    Relevant Problems   ANESTHESIA (within normal limits)      CARDIO   (+) Essential hypertension   (+) Moderate mixed hyperlipidemia not requiring statin therapy      ENDO (within normal limits)      GI/HEPATIC (within normal limits)      /RENAL (within normal limits)      HEMATOLOGY (within normal limits)      MUSCULOSKELETAL (within normal limits)      NEURO/PSYCH (within normal limits)      PULMONARY   (+) NELLI (obstructive sleep apnea)   (-) Asthma   (-) Smoking      Other   (+) Obesity due to excess calories        Physical Exam    Airway    Mallampati score: II  TM Distance: >3 FB  Neck ROM: full     Dental       Cardiovascular  Cardiovascular exam normal    Pulmonary  Pulmonary exam normal     Other Findings        Anesthesia Plan  ASA Score- 3     Anesthesia Type- IV sedation with anesthesia with ASA Monitors  Additional Monitors:   Airway Plan:           Plan Factors-Exercise tolerance (METS): >4 METS  Chart reviewed  EKG reviewed  Existing labs reviewed  Patient summary reviewed  Patient is not a current smoker  Patient not instructed to abstain from smoking on day of procedure  Patient did not smoke on day of surgery  Induction-     Postoperative Plan-     Informed Consent- Anesthetic plan and risks discussed with patient  I personally reviewed this patient with the CRNA  Discussed and agreed on the Anesthesia Plan with the CRNA               Lab Results   Component Value Date    HGBA1C 5 4 07/07/2021       Lab Results   Component Value Date    K 3 7 08/05/2022     (H) 08/05/2022    CO2 28 08/05/2022    BUN 11 08/05/2022    CREATININE 0 71 08/05/2022    GLUF 89 08/05/2022    CALCIUM 9 0 08/05/2022    CORRECTEDCA 9 5 07/07/2021    AST 39 08/05/2022    ALT 63 08/05/2022    ALKPHOS 133 (H) 08/05/2022    EGFR 98 08/05/2022       Lab Results   Component Value Date    WBC 10 03 11/19/2019    HGB 12 6 11/19/2019    HCT 40 5 11/19/2019    MCV 92 11/19/2019  11/19/2019

## 2023-01-11 NOTE — H&P
History and Physical -  [facility]  Elie Nieves 47 y o  female MRN: [id number]                  HPI: Elie Nieves is a 47y o  year old female who presents for colonoscopy for colon cancer screening  REVIEW OF SYSTEMS: Per the HPI, and otherwise unremarkable  Historical Information   Past Medical History:   Diagnosis Date   • Myositis 11/20/2019     History reviewed  No pertinent surgical history  Social History   Social History     Substance and Sexual Activity   Alcohol Use Never     Social History     Substance and Sexual Activity   Drug Use Never     Social History     Tobacco Use   Smoking Status Never   Smokeless Tobacco Never     Family History   Problem Relation Age of Onset   • No Known Problems Mother    • No Known Problems Sister    • No Known Problems Sister    • No Known Problems Sister    • No Known Problems Maternal Grandmother    • No Known Problems Maternal Grandfather    • No Known Problems Paternal Grandmother    • No Known Problems Paternal Grandfather    • No Known Problems Maternal Aunt    • No Known Problems Paternal Aunt    • No Known Problems Paternal Aunt        Meds/Allergies     (Not in a hospital admission)      No Known Allergies    Objective     Blood pressure 151/90, pulse 89, temperature 97 9 °F (36 6 °C), temperature source Temporal, resp  rate 20, height 5' 2" (1 575 m), weight 107 kg (236 lb), SpO2 98 %  PHYSICAL EXAM    Gen: NAD  CV: RRR  CHEST: Clear  ABD: soft, NT/ND  EXT: no edema      ASSESSMENT/PLAN:   Elie Nieves is a 47y o  year old female who presents for colonoscopy for colon cancer screening  The patient is stable and optimized for the procedure, we reviewed risk and benefits  Risk include but not limited to infection, bleeding, perforation and missing a lesion

## 2023-01-11 NOTE — ANESTHESIA POSTPROCEDURE EVALUATION
Post-Op Assessment Note    CV Status:  Stable  Pain Score: 0    Pain management: adequate     Mental Status:  Alert and awake   Hydration Status:  Euvolemic   PONV Controlled:  Controlled   Airway Patency:  Patent      Post Op Vitals Reviewed: Yes      Staff: CRNA         No notable events documented      BP   101/52   Temp      Pulse 79   Resp 16   SpO2 97

## 2023-01-12 NOTE — RESULT ENCOUNTER NOTE
Please call patient and inform her that she had 2 small polyps that were found during her colonoscopy, both of which were removed  She will require repeat colonoscopy in 7 years  Patient is Croatian-speaking  Thank you!

## 2023-01-18 NOTE — RESULT ENCOUNTER NOTE
Dear staff:    Please kindly communicate with patient that both polyps removed during colonoscopy were benign but precancerous so it is good news that we found and removed them  Repeat colonoscopy in 7 years due to history of polyps  Please place in recall for repeat colonoscopy in 7 years as well  Thank you

## 2023-02-07 ENCOUNTER — HOSPITAL ENCOUNTER (OUTPATIENT)
Dept: MAMMOGRAPHY | Facility: CLINIC | Age: 53
Discharge: HOME | End: 2023-02-07

## 2023-02-07 VITALS — BODY MASS INDEX: 43.43 KG/M2 | HEIGHT: 62 IN | WEIGHT: 236 LBS

## 2023-02-07 DIAGNOSIS — R92.8 ABNORMAL SCREENING MAMMOGRAM: ICD-10-CM

## 2023-02-10 NOTE — RESULT ENCOUNTER NOTE
Dear clinical team, please inform patient that her breast imaging shows that the mass is probably benign  She will need to follow up in 6 months   Both scheduled for August 2023

## 2023-02-13 ENCOUNTER — APPOINTMENT (OUTPATIENT)
Dept: LAB | Facility: CLINIC | Age: 53
End: 2023-02-13

## 2023-02-13 ENCOUNTER — OFFICE VISIT (OUTPATIENT)
Dept: FAMILY MEDICINE CLINIC | Facility: CLINIC | Age: 53
End: 2023-02-13

## 2023-02-13 VITALS
HEIGHT: 62 IN | DIASTOLIC BLOOD PRESSURE: 84 MMHG | BODY MASS INDEX: 42.33 KG/M2 | OXYGEN SATURATION: 96 % | RESPIRATION RATE: 18 BRPM | HEART RATE: 86 BPM | TEMPERATURE: 97.8 F | WEIGHT: 230 LBS | SYSTOLIC BLOOD PRESSURE: 132 MMHG

## 2023-02-13 DIAGNOSIS — E78.2 MODERATE MIXED HYPERLIPIDEMIA NOT REQUIRING STATIN THERAPY: ICD-10-CM

## 2023-02-13 DIAGNOSIS — I10 ESSENTIAL HYPERTENSION: ICD-10-CM

## 2023-02-13 DIAGNOSIS — R92.8 ABNORMAL MAMMOGRAM: Primary | ICD-10-CM

## 2023-02-13 DIAGNOSIS — G47.33 OSA (OBSTRUCTIVE SLEEP APNEA): ICD-10-CM

## 2023-02-13 NOTE — ASSESSMENT & PLAN NOTE
Patient was previously at high risk with snoring, daytime fatigue, HTN diagnosis, BMI>35, and age >47  Patient reports she is no longer snoring (per daughter and ) and is no longer experiencing daytime fatigue  Still has 3 risk factors for NELLI which were again discussed today  - Sleep study ordered as well as referral to sleep medicine at our prior visit- patient has not yet scheduled this and was advised to do so  Discussed the risks of untreated sleep apnea with both patient and daughter

## 2023-02-13 NOTE — ASSESSMENT & PLAN NOTE
Lipid Panel from 8/2022: , , HDL 43, LDL 90  No indication for a statin at this time  ASCVD Risk: 1 5%    - Patient counseled on lifestyle modifications

## 2023-02-13 NOTE — ASSESSMENT & PLAN NOTE
BP Today: 132/84  At goal per JNC-8 guidelines  CMP from 8/2022 within normal limits  Urine microalbumin/creatinine ratio from 2/2022 within normal limits at 8     - Continue Lisinopril 5 mg daily   - Patient counseled on low salt diet as well as exercise  - Repeat urine M/C ordered

## 2023-02-13 NOTE — ASSESSMENT & PLAN NOTE
Screening mammogram from 12/2022 showed an asymmetry seen in the central region of the right breast in the posterior depth on the MLO view, with no abnormalities noted on left breast    Diagnostic ultrasound/mammogram from 2/2023 showed a well-circumscribed 12 mm mass in the deep lower outer right breast on this baseline mammogram and ultrasound- this has a probably benign appearance and 6-month follow-up is recommended to confirm stability  Went over results with patient today  - 6 month ultrasound and mammogram scheduled

## 2023-02-13 NOTE — PROGRESS NOTES
Assessment/Plan:    Abnormal mammogram  Screening mammogram from 12/2022 showed an asymmetry seen in the central region of the right breast in the posterior depth on the MLO view, with no abnormalities noted on left breast    Diagnostic ultrasound/mammogram from 2/2023 showed a well-circumscribed 12 mm mass in the deep lower outer right breast on this baseline mammogram and ultrasound- this has a probably benign appearance and 6-month follow-up is recommended to confirm stability  Went over results with patient today  - 6 month ultrasound and mammogram scheduled  Essential hypertension  BP Today: 132/84  At goal per JNC-8 guidelines  CMP from 8/2022 within normal limits  Urine microalbumin/creatinine ratio from 2/2022 within normal limits at 8     - Continue Lisinopril 5 mg daily   - Patient counseled on low salt diet as well as exercise  - Repeat urine M/C ordered  NELLI (obstructive sleep apnea)  Patient was previously at high risk with snoring, daytime fatigue, HTN diagnosis, BMI>35, and age >47  Patient reports she is no longer snoring (per daughter and ) and is no longer experiencing daytime fatigue  Still has 3 risk factors for NELLI which were again discussed today  - Sleep study ordered as well as referral to sleep medicine at our prior visit- patient has not yet scheduled this and was advised to do so  Discussed the risks of untreated sleep apnea with both patient and daughter  Moderate mixed hyperlipidemia not requiring statin therapy  Lipid Panel from 8/2022: , , HDL 43, LDL 90  No indication for a statin at this time  ASCVD Risk: 1 5%    - Patient counseled on lifestyle modifications  Health Maintenance:    - UTD on flu vaccine  - Shingrix vaccine ordered (both doses), printed out, signed and given back to patient and advised to go to pharmacy (at our prior visit)  - Underwent colonoscopy in 1/2023: 2 small polyps were removed   Will require repeat in 7 years due to personal history of polyps  Diagnoses and all orders for this visit:    Abnormal mammogram    Essential hypertension  -     Microalbumin / creatinine urine ratio    NELLI (obstructive sleep apnea)    Moderate mixed hyperlipidemia not requiring statin therapy          Subjective:      Patient ID: Federica Ashraf is a 47 y o  female  A very pleasant 45year old female with PMH of HTN and obesity presents to the clinic for a follow up on her conditions  Patient's medical conditions are stable unless noted otherwise above  Patient has not had any recent hospitalizations, or medical emergencies since last visit  Patient has no further complaints other than what is mentioned in the review of systems  The following portions of the patient's history were reviewed and updated as appropriate: allergies, current medications, past family history, past medical history, past social history, past surgical history and problem list     Review of Systems   Constitutional: Negative for activity change, appetite change, chills and fever  HENT: Negative for sore throat  Respiratory: Negative for cough and shortness of breath  Cardiovascular: Negative for chest pain, palpitations and leg swelling  Gastrointestinal: Negative for abdominal pain, constipation, diarrhea, nausea and vomiting  Musculoskeletal: Negative for back pain and gait problem  Neurological: Negative for dizziness, seizures, weakness and headaches  Objective:      /84 (BP Location: Right arm, Patient Position: Sitting, Cuff Size: Large)   Pulse 86   Temp 97 8 °F (36 6 °C) (Temporal)   Resp 18   Ht 5' 2" (1 575 m)   Wt 104 kg (230 lb)   SpO2 96%   BMI 42 07 kg/m²        Physical Exam  Vitals reviewed  Constitutional:       General: She is not in acute distress  Appearance: She is well-developed  She is obese  She is not diaphoretic  HENT:      Head: Normocephalic and atraumatic     Eyes: Conjunctiva/sclera: Conjunctivae normal    Cardiovascular:      Rate and Rhythm: Normal rate and regular rhythm  Heart sounds: Normal heart sounds  No murmur heard  No friction rub  No gallop  Pulmonary:      Effort: Pulmonary effort is normal  No respiratory distress  Breath sounds: Normal breath sounds  No wheezing or rales  Abdominal:      General: Bowel sounds are normal  There is no distension  Palpations: Abdomen is soft  There is no mass  Tenderness: There is no abdominal tenderness  There is no guarding  Musculoskeletal:         General: No tenderness or deformity  Normal range of motion  Cervical back: Normal range of motion  Right lower leg: No edema  Left lower leg: No edema  Skin:     General: Skin is warm and dry  Neurological:      General: No focal deficit present  Mental Status: She is alert and oriented to person, place, and time             Fabrizio Sims MD  2/13/2023

## 2023-02-14 LAB
CREAT UR-MCNC: 174 MG/DL
MICROALBUMIN UR-MCNC: 14.6 MG/L (ref 0–20)
MICROALBUMIN/CREAT 24H UR: 8 MG/G CREATININE (ref 0–30)

## 2023-05-19 ENCOUNTER — OFFICE VISIT (OUTPATIENT)
Dept: FAMILY MEDICINE CLINIC | Facility: CLINIC | Age: 53
End: 2023-05-19

## 2023-05-19 VITALS
HEART RATE: 90 BPM | TEMPERATURE: 97.6 F | RESPIRATION RATE: 18 BRPM | WEIGHT: 226 LBS | HEIGHT: 62 IN | SYSTOLIC BLOOD PRESSURE: 126 MMHG | DIASTOLIC BLOOD PRESSURE: 82 MMHG | BODY MASS INDEX: 41.59 KG/M2 | OXYGEN SATURATION: 99 %

## 2023-05-19 DIAGNOSIS — R05.1 ACUTE COUGH: ICD-10-CM

## 2023-05-19 DIAGNOSIS — I10 ESSENTIAL HYPERTENSION: Primary | ICD-10-CM

## 2023-05-19 RX ORDER — BENZONATATE 100 MG/1
100 CAPSULE ORAL 3 TIMES DAILY PRN
Qty: 20 CAPSULE | Refills: 0 | Status: SHIPPED | OUTPATIENT
Start: 2023-05-19

## 2023-05-19 RX ORDER — LISINOPRIL 5 MG/1
5 TABLET ORAL DAILY
Qty: 90 TABLET | Refills: 1 | Status: SHIPPED | OUTPATIENT
Start: 2023-05-19

## 2023-05-19 NOTE — ASSESSMENT & PLAN NOTE
BP Today: Blood Pressure: 126/82   At goal per JNC-8 guidelines  Current Medications: Lisinopril 5 mg  Urine microalbumin/creatinine ratio from 2/2022 within normal limits at 8  PLAN:   - Continue Lisinopril 5 mg daily   - Patient counseled on low salt diet as well as exercise  - Repeat urine M/C ordered    - will do CMP to check electrolyte

## 2023-05-19 NOTE — PROGRESS NOTES
Name: Tyler Yeung      : 1970      MRN: [id number]  Encounter Provider: Tomy Jarrell MD  Encounter Date: 2023   Encounter department: [address] [place] [address]  Essential hypertension  Assessment & Plan:  BP Today: Blood Pressure: 126/82   At goal per JNC-8 guidelines  Current Medications: Lisinopril 5 mg  Urine microalbumin/creatinine ratio from 2022 within normal limits at 8  PLAN:   - Continue Lisinopril 5 mg daily   - Patient counseled on low salt diet as well as exercise  - Repeat urine M/C ordered  - will do CMP to check electrolyte     Orders:  -     Comprehensive metabolic panel; Future  -     lisinopril (ZESTRIL) 5 mg tablet; Take 1 tablet (5 mg total) by mouth daily    2  Acute cough  Assessment & Plan:  From , with associated sore throat   Likely allergies    PLAN:   - Claritin daily   - Will do tessalon perles   - Can do tylenol/ibuprofen for pain management   - If no improvement can return     Orders:  -     benzonatate (TESSALON PERLES) 100 mg capsule; Take 1 capsule (100 mg total) by mouth 3 (three) times a day as needed for cough           Subjective     Patient is [place] speaking only and voice  services was utilized to conduct office visit  Tyler Yeung is a 47 y o  female with pmhx of htn presenting today review of chronic conditions  Patient is accompanied by daughter, Michael Au     Patient reports that they are doing well/ have been compliant with medications  Review of Systems   Constitutional: Negative for chills and fever  HENT: Negative for ear pain and sore throat  Eyes: Negative for pain and visual disturbance  Respiratory: Positive for cough  Gastrointestinal: Negative for abdominal pain and vomiting  Genitourinary: Negative for dysuria and hematuria  Musculoskeletal: Negative for arthralgias and back pain  Skin: Negative for color change and rash  Neurological: Negative for seizures and syncope  All other systems reviewed and are negative  Past Medical History:   Diagnosis Date   • Myositis 11/20/2019     No past surgical history on file  Family History   Problem Relation Age of Onset   • No Known Problems Mother    • No Known Problems Father    • No Known Problems Sister    • No Known Problems Sister    • No Known Problems Sister    • No Known Problems Daughter    • No Known Problems Maternal Grandmother    • No Known Problems Maternal Grandfather    • No Known Problems Paternal Grandmother    • No Known Problems Paternal Grandfather    • No Known Problems Maternal Aunt    • No Known Problems Paternal Aunt    • No Known Problems Paternal Aunt      Social History     Socioeconomic History   • Marital status: /Civil Union     Spouse name: None   • Number of children: None   • Years of education: None   • Highest education level: None   Occupational History   • None   Tobacco Use   • Smoking status: Never     Passive exposure: Never   • Smokeless tobacco: Never   Substance and Sexual Activity   • Alcohol use: Never   • Drug use: Never   • Sexual activity: None   Other Topics Concern   • None   Social History Narrative   • None     Social Determinants of Health     Financial Resource Strain: High Risk   • Difficulty of Paying Living Expenses: Very hard   Food Insecurity: Food Insecurity Present   • Worried About Running Out of Food in the Last Year: Often true   • Ran Out of Food in the Last Year: Often true   Transportation Needs: No Transportation Needs   • Lack of Transportation (Medical): No   • Lack of Transportation (Non-Medical):  No   Physical Activity: Not on file   Stress: Not on file   Social Connections: Not on file   Intimate Partner Violence: Not on file   Housing Stability: Not on file     Current Outpatient Medications on File Prior to Visit   Medication Sig   • Benzocaine-Resorcinol (Vagisil) 5-2 % CREA Apply 1 application "topically daily as needed (vaginal itching)   • polyethylene glycol (GOLYTELY) 4000 mL solution Take 4,000 mL by mouth once for 1 dose   • polyethylene glycol (GOLYTELY) 4000 mL solution Take 4,000 mL by mouth once for 1 dose Colonoscopy prep   • [DISCONTINUED] lisinopril (ZESTRIL) 5 mg tablet TAKE 1 TABLET(5 MG) BY MOUTH DAILY     No Known Allergies  Immunization History   Administered Date(s) Administered   • Influenza, recombinant, quadrivalent,injectable, preservative free 12/28/2021, 11/07/2022   • Tdap 04/29/2022       Objective     /82 (BP Location: Left arm, Patient Position: Sitting, Cuff Size: Large)   Pulse 90   Temp 97 6 °F (36 4 °C) (Temporal)   Resp 18   Ht 5' 2\" (1 575 m)   Wt 103 kg (226 lb)   LMP  (LMP Unknown)   SpO2 99%   BMI 41 34 kg/m²     Physical Exam  Constitutional:       Appearance: Normal appearance  HENT:      Head: Normocephalic and atraumatic  Eyes:      Extraocular Movements: Extraocular movements intact  Pupils: Pupils are equal, round, and reactive to light  Cardiovascular:      Rate and Rhythm: Normal rate and regular rhythm  Pulses: Normal pulses  Heart sounds: Normal heart sounds  Pulmonary:      Effort: Pulmonary effort is normal  No respiratory distress  Breath sounds: Normal breath sounds  No stridor  No wheezing, rhonchi or rales  Abdominal:      General: Bowel sounds are normal  There is no distension  Palpations: Abdomen is soft  Tenderness: There is no abdominal tenderness  Skin:     General: Skin is warm  Capillary Refill: Capillary refill takes less than 2 seconds  Neurological:      General: No focal deficit present  Mental Status: She is alert and oriented to person, place, and time     Psychiatric:         Mood and Affect: Mood normal          Behavior: Behavior normal        Bailey Wilson MD  "

## 2023-05-19 NOTE — ASSESSMENT & PLAN NOTE
From Sunday, with associated sore throat   Likely allergies    PLAN:   - Claritin daily   - Will do tessalon perles   - Can do tylenol/ibuprofen for pain management   - If no improvement can return

## 2023-07-18 PROBLEM — R05.1 ACUTE COUGH: Status: RESOLVED | Noted: 2023-05-19 | Resolved: 2023-07-18

## 2023-08-02 DIAGNOSIS — I10 ESSENTIAL HYPERTENSION: ICD-10-CM

## 2023-08-03 RX ORDER — LISINOPRIL 5 MG/1
5 TABLET ORAL DAILY
Qty: 90 TABLET | Refills: 2 | Status: SHIPPED | OUTPATIENT
Start: 2023-08-03

## 2023-08-08 ENCOUNTER — HOSPITAL ENCOUNTER (OUTPATIENT)
Dept: MAMMOGRAPHY | Facility: CLINIC | Age: 53
Discharge: HOME | End: 2023-08-08
Payer: COMMERCIAL

## 2023-08-08 ENCOUNTER — APPOINTMENT (OUTPATIENT)
Dept: LAB | Facility: CLINIC | Age: 53
End: 2023-08-08
Payer: COMMERCIAL

## 2023-08-08 VITALS — HEIGHT: 62 IN | WEIGHT: 226 LBS | BODY MASS INDEX: 41.59 KG/M2

## 2023-08-08 DIAGNOSIS — Z09 FOLLOW-UP EXAM, 3-6 MONTHS SINCE PREVIOUS EXAM: ICD-10-CM

## 2023-08-08 DIAGNOSIS — I10 ESSENTIAL HYPERTENSION: ICD-10-CM

## 2023-08-08 LAB
ALBUMIN SERPL BCP-MCNC: 3.5 G/DL (ref 3.5–5)
ALP SERPL-CCNC: 134 U/L (ref 46–116)
ALT SERPL W P-5'-P-CCNC: 42 U/L (ref 12–78)
ANION GAP SERPL CALCULATED.3IONS-SCNC: 4 MMOL/L
AST SERPL W P-5'-P-CCNC: 26 U/L (ref 5–45)
BILIRUB SERPL-MCNC: 0.47 MG/DL (ref 0.2–1)
BUN SERPL-MCNC: 13 MG/DL (ref 5–25)
CALCIUM SERPL-MCNC: 9.2 MG/DL (ref 8.3–10.1)
CHLORIDE SERPL-SCNC: 111 MMOL/L (ref 96–108)
CO2 SERPL-SCNC: 28 MMOL/L (ref 21–32)
CREAT SERPL-MCNC: 0.75 MG/DL (ref 0.6–1.3)
GFR SERPL CREATININE-BSD FRML MDRD: 91 ML/MIN/1.73SQ M
GLUCOSE P FAST SERPL-MCNC: 101 MG/DL (ref 65–99)
POTASSIUM SERPL-SCNC: 4.7 MMOL/L (ref 3.5–5.3)
PROT SERPL-MCNC: 8 G/DL (ref 6.4–8.4)
SODIUM SERPL-SCNC: 143 MMOL/L (ref 135–147)

## 2023-08-08 PROCEDURE — 36415 COLL VENOUS BLD VENIPUNCTURE: CPT

## 2023-08-08 PROCEDURE — 77065 DX MAMMO INCL CAD UNI: CPT

## 2023-08-08 PROCEDURE — G0279 TOMOSYNTHESIS, MAMMO: HCPCS

## 2023-08-08 PROCEDURE — 76642 ULTRASOUND BREAST LIMITED: CPT

## 2023-08-08 PROCEDURE — 80053 COMPREHEN METABOLIC PANEL: CPT

## 2023-08-23 ENCOUNTER — OFFICE VISIT (OUTPATIENT)
Dept: FAMILY MEDICINE CLINIC | Facility: CLINIC | Age: 53
End: 2023-08-23

## 2023-08-23 VITALS
HEIGHT: 62 IN | SYSTOLIC BLOOD PRESSURE: 138 MMHG | BODY MASS INDEX: 42.14 KG/M2 | OXYGEN SATURATION: 98 % | DIASTOLIC BLOOD PRESSURE: 76 MMHG | TEMPERATURE: 97.3 F | HEART RATE: 84 BPM | RESPIRATION RATE: 16 BRPM | WEIGHT: 229 LBS

## 2023-08-23 DIAGNOSIS — I10 ESSENTIAL HYPERTENSION: ICD-10-CM

## 2023-08-23 PROCEDURE — 99213 OFFICE O/P EST LOW 20 MIN: CPT | Performed by: FAMILY MEDICINE

## 2023-08-23 RX ORDER — LISINOPRIL 5 MG/1
5 TABLET ORAL DAILY
Qty: 90 TABLET | Refills: 2 | Status: SHIPPED | OUTPATIENT
Start: 2023-08-23

## 2023-08-23 NOTE — PROGRESS NOTES
Name: Liat Alfred      : 1970      MRN: [id number]  Encounter Provider: Jenny Wells MD  Encounter Date: 2023   Encounter department: [facility] [facility]     1. Essential hypertension  Assessment & Plan:  BP today 138/76 mmhg   Home medications: Lisinopril 5 mg qd  BP well controlled according to JNC guidelines  23 Urine microalbumin/Creatine ratio: 9     -Continue home medication   -Diet low in sodium   -Advised 10-15 lb weight reduction to maintain BP control   -F/u in 6 months w/ PCP     Orders:  -     lisinopril (ZESTRIL) 5 mg tablet; Take 1 tablet (5 mg total) by mouth daily         Subjective      Liat Alfred is a very pleasant female patient with a pmh of essential hypertension who presents to clinic today to f/u on this condition. Patient is adherent to lisinopril 5 mg qd and to a diet low in salt. Patient cooks her own meals and barely eats at [employer]. Discussion in office was provided to lose 10-15 lb for next visit to maintain BP at goal and to prevent other chronic diseases such as DM2, CKD and liver disease. Patient advised to get flu shot in October. She is up to date on shingles vaccine (received last year at local pharmacy). Review of Systems   Constitutional: Negative for chills and fever. HENT: Negative for ear pain and sore throat. Eyes: Negative for pain and visual disturbance. Respiratory: Negative for cough and shortness of breath. Cardiovascular: Negative for chest pain and palpitations. Gastrointestinal: Negative for abdominal pain and vomiting. Genitourinary: Negative for dysuria and hematuria. Musculoskeletal: Negative for arthralgias and back pain. Skin: Negative for color change and rash. Neurological: Negative for seizures and syncope. All other systems reviewed and are negative.       Current Outpatient Medications on File Prior to Visit   Medication Sig   • Benzocaine-Resorcinol (Vagisil) 5-2 % CREA Apply 1 application topically daily as needed (vaginal itching)   • benzonatate (TESSALON PERLES) 100 mg capsule Take 1 capsule (100 mg total) by mouth 3 (three) times a day as needed for cough   • polyethylene glycol (GOLYTELY) 4000 mL solution Take 4,000 mL by mouth once for 1 dose   • polyethylene glycol (GOLYTELY) 4000 mL solution Take 4,000 mL by mouth once for 1 dose Colonoscopy prep   • [DISCONTINUED] lisinopril (ZESTRIL) 5 mg tablet TAKE 1 TABLET (5 MG TOTAL) BY MOUTH DAILY. Objective     /76 (BP Location: Right arm, Patient Position: Sitting, Cuff Size: Extra-Large)   Pulse 84   Temp (!) 97.3 °F (36.3 °C) (Temporal)   Resp 16   Ht 5' 2" (1.575 m)   Wt 104 kg (229 lb)   SpO2 98%   BMI 41.88 kg/m²     Physical Exam  Vitals reviewed. HENT:      Head: Normocephalic. Right Ear: Tympanic membrane normal.      Nose: Nose normal.      Mouth/Throat:      Mouth: Mucous membranes are dry. Pharynx: Oropharynx is clear. Cardiovascular:      Rate and Rhythm: Normal rate and regular rhythm. Heart sounds: Normal heart sounds. Pulmonary:      Breath sounds: Normal breath sounds. Abdominal:      General: Bowel sounds are normal.   Musculoskeletal:         General: Normal range of motion. Skin:     General: Skin is warm. Capillary Refill: Capillary refill takes less than 2 seconds. Neurological:      General: No focal deficit present. Mental Status: She is alert.    Psychiatric:         Behavior: Behavior normal.       Lynn Ppo MD

## 2023-08-23 NOTE — ASSESSMENT & PLAN NOTE
BP today 138/76 mmhg   Home medications: Lisinopril 5 mg qd  BP well controlled according to JNC guidelines  2/13/23 Urine microalbumin/Creatine ratio: 9     -Continue home medication   -Diet low in sodium   -Advised 10-15 lb weight reduction to maintain BP control   -F/u in 6 months w/ PCP

## 2024-02-08 ENCOUNTER — HOSPITAL ENCOUNTER (OUTPATIENT)
Dept: MAMMOGRAPHY | Facility: CLINIC | Age: 54
End: 2024-02-08
Payer: COMMERCIAL

## 2024-02-08 VITALS — BODY MASS INDEX: 42.14 KG/M2 | WEIGHT: 229 LBS | HEIGHT: 62 IN

## 2024-02-08 DIAGNOSIS — R92.8 ABNORMAL MAMMOGRAM: ICD-10-CM

## 2024-02-08 PROCEDURE — G0279 TOMOSYNTHESIS, MAMMO: HCPCS

## 2024-02-08 PROCEDURE — 76642 ULTRASOUND BREAST LIMITED: CPT

## 2024-02-08 PROCEDURE — 77066 DX MAMMO INCL CAD BI: CPT

## 2024-07-12 ENCOUNTER — OFFICE VISIT (OUTPATIENT)
Dept: FAMILY MEDICINE CLINIC | Facility: CLINIC | Age: 54
End: 2024-07-12

## 2024-07-12 VITALS
TEMPERATURE: 97.8 F | HEART RATE: 73 BPM | BODY MASS INDEX: 41.77 KG/M2 | SYSTOLIC BLOOD PRESSURE: 155 MMHG | WEIGHT: 227 LBS | RESPIRATION RATE: 18 BRPM | OXYGEN SATURATION: 98 % | DIASTOLIC BLOOD PRESSURE: 92 MMHG | HEIGHT: 62 IN

## 2024-07-12 DIAGNOSIS — R74.8 ELEVATED ALKALINE PHOSPHATASE LEVEL: ICD-10-CM

## 2024-07-12 DIAGNOSIS — G47.33 OSA (OBSTRUCTIVE SLEEP APNEA): ICD-10-CM

## 2024-07-12 DIAGNOSIS — E78.2 MODERATE MIXED HYPERLIPIDEMIA NOT REQUIRING STATIN THERAPY: ICD-10-CM

## 2024-07-12 DIAGNOSIS — J06.9 VIRAL URI: ICD-10-CM

## 2024-07-12 DIAGNOSIS — I10 ESSENTIAL HYPERTENSION: Primary | ICD-10-CM

## 2024-07-12 PROCEDURE — 99213 OFFICE O/P EST LOW 20 MIN: CPT | Performed by: INTERNAL MEDICINE

## 2024-07-12 RX ORDER — LISINOPRIL 5 MG/1
5 TABLET ORAL DAILY
Qty: 90 TABLET | Refills: 2 | Status: SHIPPED | OUTPATIENT
Start: 2024-07-12

## 2024-07-12 RX ORDER — GUAIFENESIN/DEXTROMETHORPHAN 100-10MG/5
5 SYRUP ORAL 3 TIMES DAILY PRN
Qty: 118 ML | Refills: 0 | Status: SHIPPED | OUTPATIENT
Start: 2024-07-12 | End: 2024-07-20

## 2024-07-12 NOTE — ASSESSMENT & PLAN NOTE
Lipid panel on 02/2022:   Cholesterol/TG/HDL/LDL: 150/107/23/90    Plan:  Will recheck; follow-up lipid panel

## 2024-07-12 NOTE — ASSESSMENT & PLAN NOTE
Previous CMP completed on 7/2021, 8/5/2022, 8/5/2022 08/2023 shows elevated ALP.  138> 133> 134.  Hepatic function panel completed on 11/2019 shows elevated ALP of 154 and AST of 48.  Will recheck CMP and if ALP remains elevated will follow-up with GGT.    Plan:  CMP  Consider checking GGT if ALP elevated

## 2024-07-12 NOTE — ASSESSMENT & PLAN NOTE
BP Readings from Last 3 Encounters:   07/12/24 155/92   08/23/23 138/76   05/19/23 126/82   BP not at goal.  Asymptomatic.  Has not taken lisinopril in 2 weeks due to pharmacy refusing to refill medications.  Current regimen: Lisinopril 5 mg daily    Plan:  Continue current regimen  DASH diet discussed  Reduced sodium intake advised  Exercise routine strongly encouraged  Follow-up CMP and albumin creatinine ratio

## 2024-07-12 NOTE — ASSESSMENT & PLAN NOTE
Risk factors for NELLI including snoring, daytime fatigue, hypertension, BMI of 41.88 kg/m²  Pending sleep study order yet to be completed.  Advised patient to follow-up with sleep study but refused.

## 2024-07-12 NOTE — PROGRESS NOTES
Ambulatory Visit  Name: Darcie Reyes      : 1970      MRN: [id number]  Encounter Provider: Esteban Rockwell MD  Encounter Date: 2024   Encounter department: [facility]    Assessment & Plan   1. Essential hypertension  Assessment & Plan:  BP Readings from Last 3 Encounters:   24 155/92   23 138/76   23 126/82   BP not at goal.  Asymptomatic.  Has not taken lisinopril in 2 weeks due to pharmacy refusing to refill medications.  Current regimen: Lisinopril 5 mg daily    Plan:  Continue current regimen  DASH diet discussed  Reduced sodium intake advised  Exercise routine strongly encouraged  Follow-up CMP and albumin creatinine ratio      Orders:  -     Comprehensive metabolic panel; Future  -     Albumin / creatinine urine ratio; Future  -     lisinopril (ZESTRIL) 5 mg tablet; Take 1 tablet (5 mg total) by mouth daily  2. NELLI (obstructive sleep apnea)  Assessment & Plan:  Risk factors for NELLI including snoring, daytime fatigue, hypertension, BMI of 41.88 kg/m²  Pending sleep study order yet to be completed.  Advised patient to follow-up with sleep study but refused.  Orders:  -     Ambulatory Referral to Sleep Medicine; Future  3. Elevated alkaline phosphatase level  Assessment & Plan:  Previous CMP completed on 2021, 2022, 2022 shows elevated ALP.  138> 133> 134.  Hepatic function panel completed on 2019 shows elevated ALP of 154 and AST of 48.  Will recheck CMP and if ALP remains elevated will follow-up with GGT.    Plan:  CMP  Consider checking GGT if ALP elevated  4. Moderate mixed hyperlipidemia not requiring statin therapy  Assessment & Plan:  Lipid panel on 2022:   Cholesterol/TG/HDL/LDL: 150/107/23/90    Plan:  Will recheck; follow-up lipid panel  Orders:  -     Lipid Panel with Direct LDL reflex; Future  5. Viral URI  Comments:  Cough x 2 days.  Denies myalgia or arthralgias.  No fever, chills, nausea, vomiting or  "diarrhea.  Orders:  -     dextromethorphan-guaiFENesin (ROBITUSSIN DM)  mg/5 mL syrup; Take 5 mL by mouth 3 (three) times a day as needed for cough for up to 8 days       History of Present Illness     Patient is a 53-year-old female with PMH of hypertension presenting today for follow-up to discuss chronic medical conditions and medication refill.  Reports medication compliance although has not taken her lisinopril due to pharmacy not refilling her prescription.  States pharmacy told her see her PCP prior to obtaining her refills.  Denies chest pain, headache, visual disturbance or abdominal discomfort.  No shortness of breath or GILLESPIE.  Denies fever, chills, nausea, vomiting or diarrhea.        Review of Systems   Constitutional:  Negative for chills and fever.   HENT:  Negative for congestion, sinus pressure, sinus pain and sore throat.    Eyes:  Negative for visual disturbance.   Respiratory:  Positive for cough. Negative for chest tightness, shortness of breath and wheezing.    Cardiovascular:  Negative for chest pain and palpitations.   Gastrointestinal:  Negative for abdominal pain, diarrhea, nausea and vomiting.   Neurological:  Negative for weakness and headaches.       Objective     /92 (BP Location: Left arm, Patient Position: Sitting, Cuff Size: Large) Comment: pt been out of medication for 2 weeks  Pulse 73   Temp 97.8 °F (36.6 °C) (Temporal)   Resp 18   Ht 5' 2\" (1.575 m)   Wt 103 kg (227 lb)   LMP  (LMP Unknown)   SpO2 98%   BMI 41.52 kg/m²     Physical Exam  Constitutional:       General: She is not in acute distress.     Appearance: She is obese.   HENT:      Head: Normocephalic and atraumatic.      Mouth/Throat:      Mouth: Mucous membranes are moist.      Pharynx: Oropharynx is clear.   Eyes:      Extraocular Movements: Extraocular movements intact.   Cardiovascular:      Rate and Rhythm: Normal rate and regular rhythm.      Pulses: Normal pulses.      Heart sounds: Normal heart " sounds. No murmur heard.  Pulmonary:      Effort: Pulmonary effort is normal.      Breath sounds: Normal breath sounds. No wheezing.   Abdominal:      Palpations: Abdomen is soft.      Tenderness: There is no abdominal tenderness.   Musculoskeletal:         General: Normal range of motion.      Cervical back: Normal range of motion and neck supple.   Skin:     General: Skin is warm.      Capillary Refill: Capillary refill takes less than 2 seconds.   Neurological:      General: No focal deficit present.      Mental Status: She is alert.   Psychiatric:         Mood and Affect: Mood normal.                 Administrative Statements

## 2024-07-16 DIAGNOSIS — G47.33 OSA (OBSTRUCTIVE SLEEP APNEA): Primary | ICD-10-CM

## 2024-08-10 ENCOUNTER — APPOINTMENT (OUTPATIENT)
Dept: LAB | Facility: HOSPITAL | Age: 54
End: 2024-08-10
Payer: COMMERCIAL

## 2024-08-10 DIAGNOSIS — I10 ESSENTIAL HYPERTENSION: ICD-10-CM

## 2024-08-10 DIAGNOSIS — E78.2 MODERATE MIXED HYPERLIPIDEMIA NOT REQUIRING STATIN THERAPY: ICD-10-CM

## 2024-08-10 LAB
ALBUMIN SERPL BCG-MCNC: 4 G/DL (ref 3.5–5)
ALP SERPL-CCNC: 136 U/L (ref 34–104)
ALT SERPL W P-5'-P-CCNC: 30 U/L (ref 7–52)
ANION GAP SERPL CALCULATED.3IONS-SCNC: 9 MMOL/L (ref 4–13)
AST SERPL W P-5'-P-CCNC: 27 U/L (ref 13–39)
BILIRUB SERPL-MCNC: 0.46 MG/DL (ref 0.2–1)
BUN SERPL-MCNC: 14 MG/DL (ref 5–25)
CALCIUM SERPL-MCNC: 9 MG/DL (ref 8.4–10.2)
CHLORIDE SERPL-SCNC: 105 MMOL/L (ref 96–108)
CHOLEST SERPL-MCNC: 181 MG/DL
CO2 SERPL-SCNC: 26 MMOL/L (ref 21–32)
CREAT SERPL-MCNC: 0.65 MG/DL (ref 0.6–1.3)
CREAT UR-MCNC: 163.9 MG/DL
GFR SERPL CREATININE-BSD FRML MDRD: 100 ML/MIN/1.73SQ M
GLUCOSE P FAST SERPL-MCNC: 108 MG/DL (ref 65–99)
HDLC SERPL-MCNC: 58 MG/DL
LDLC SERPL CALC-MCNC: 94 MG/DL (ref 0–100)
MICROALBUMIN UR-MCNC: 9.8 MG/L
MICROALBUMIN/CREAT 24H UR: 6 MG/G CREATININE (ref 0–30)
POTASSIUM SERPL-SCNC: 4.1 MMOL/L (ref 3.5–5.3)
PROT SERPL-MCNC: 7.7 G/DL (ref 6.4–8.4)
SODIUM SERPL-SCNC: 140 MMOL/L (ref 135–147)
TRIGL SERPL-MCNC: 147 MG/DL

## 2024-08-10 PROCEDURE — 82570 ASSAY OF URINE CREATININE: CPT

## 2024-08-10 PROCEDURE — 36415 COLL VENOUS BLD VENIPUNCTURE: CPT

## 2024-08-10 PROCEDURE — 80053 COMPREHEN METABOLIC PANEL: CPT

## 2024-08-10 PROCEDURE — 82043 UR ALBUMIN QUANTITATIVE: CPT

## 2024-08-10 PROCEDURE — 80061 LIPID PANEL: CPT

## 2024-10-25 ENCOUNTER — OFFICE VISIT (OUTPATIENT)
Dept: FAMILY MEDICINE CLINIC | Facility: CLINIC | Age: 54
End: 2024-10-25

## 2024-10-25 VITALS
WEIGHT: 232 LBS | SYSTOLIC BLOOD PRESSURE: 142 MMHG | BODY MASS INDEX: 42.69 KG/M2 | HEIGHT: 62 IN | HEART RATE: 75 BPM | TEMPERATURE: 98.5 F | OXYGEN SATURATION: 98 % | RESPIRATION RATE: 18 BRPM | DIASTOLIC BLOOD PRESSURE: 72 MMHG

## 2024-10-25 DIAGNOSIS — Z23 FLU VACCINE NEED: ICD-10-CM

## 2024-10-25 DIAGNOSIS — I10 ESSENTIAL HYPERTENSION: Primary | ICD-10-CM

## 2024-10-25 PROCEDURE — 99213 OFFICE O/P EST LOW 20 MIN: CPT | Performed by: FAMILY MEDICINE

## 2024-10-25 RX ORDER — LISINOPRIL 5 MG/1
5 TABLET ORAL DAILY
Qty: 90 TABLET | Refills: 2 | Status: SHIPPED | OUTPATIENT
Start: 2024-10-25

## 2024-10-25 NOTE — ASSESSMENT & PLAN NOTE
BP Readings from Last 3 Encounters:   10/25/24 142/72   07/12/24 155/92   08/23/23 138/76   Slightly above goal. Goal of 140/90 mmHg  Current management: Lisinopril 5 mg daily  Reports medication compliance. Refill provided  Lifestyle modification strongly encouraged    Plan:  Continue current regimen  DASH diet advised  Low sodium advised; less than 2g  Moderate physical activity 150 minutes/week

## 2024-10-25 NOTE — PROGRESS NOTES
Ambulatory Visit  Name: Darcie Reyes      : 1970      MRN: [id number]  Encounter Provider: Esteban Rockwell MD  Encounter Date: 10/25/2024   Encounter department: [facility]    Assessment & Plan  Essential hypertension  BP Readings from Last 3 Encounters:   10/25/24 142/72   24 155/92   23 138/76   Slightly above goal. Goal of 140/90 mmHg  Current management: Lisinopril 5 mg daily  Reports medication compliance. Refill provided  Lifestyle modification strongly encouraged    Plan:  Continue current regimen  DASH diet advised  Low sodium advised; less than 2g  Moderate physical activity 150 minutes/week         Flu vaccine need  Flu vaccination next visit       BMI Counseling: Body mass index is 42.43 kg/m². The BMI is above normal. Nutrition recommendations include decreasing portion sizes, encouraging healthy choices of fruits and vegetables, moderation in carbohydrate intake, increasing intake of lean protein and reducing intake of saturated and trans fat. Exercise recommendations include moderate physical activity 150 minutes/week. Rationale for BMI follow-up plan is due to patient being overweight or obese.       History of Present Illness     Patient is 53 year old female with pmh of hypertension presenting today for a follow up. BP on presentation slightly above goal. Reports medication compliance. Admits to lifestyle modification non-adherence. Denies chest pain, sob/carter, abdominal discomfort or visual disturbance. Overall reports doing well with no complaints during visit.  Recent lab work discussed during visit.      Review of Systems   Constitutional:  Negative for fatigue.   Eyes:  Negative for visual disturbance.   Respiratory:  Negative for chest tightness and shortness of breath.    Cardiovascular:  Negative for chest pain and palpitations.   Gastrointestinal:  Negative for abdominal pain, nausea and vomiting.   Musculoskeletal:  Negative for  "arthralgias, myalgias and neck pain.   Neurological:  Negative for dizziness, weakness, light-headedness, numbness and headaches.   Psychiatric/Behavioral: Negative.         Objective     /72 (BP Location: Left arm, Patient Position: Sitting, Cuff Size: Large)   Pulse 75   Temp 98.5 °F (36.9 °C) (Temporal)   Resp 18   Ht 5' 2\" (1.575 m)   Wt 105 kg (232 lb)   SpO2 98%   BMI 42.43 kg/m²     Physical Exam  Constitutional:       General: She is not in acute distress.     Appearance: Normal appearance. She is obese.   HENT:      Head: Normocephalic and atraumatic.      Right Ear: Tympanic membrane and ear canal normal.      Left Ear: Tympanic membrane and ear canal normal.      Mouth/Throat:      Mouth: Mucous membranes are moist.      Pharynx: Oropharynx is clear.   Eyes:      Extraocular Movements: Extraocular movements intact.      Pupils: Pupils are equal, round, and reactive to light.   Cardiovascular:      Rate and Rhythm: Normal rate and regular rhythm.      Pulses: Normal pulses.      Heart sounds: Normal heart sounds. No murmur heard.  Pulmonary:      Effort: Pulmonary effort is normal.      Breath sounds: Normal breath sounds.   Abdominal:      Palpations: Abdomen is soft.   Musculoskeletal:         General: Normal range of motion.      Cervical back: Normal range of motion and neck supple. No rigidity.      Right lower leg: No edema.      Left lower leg: No edema.   Skin:     General: Skin is warm and dry.   Neurological:      Mental Status: She is alert.      Motor: No weakness.      Deep Tendon Reflexes:      Reflex Scores:       Patellar reflexes are 2+ on the right side and 2+ on the left side.  Psychiatric:         Mood and Affect: Mood normal.         "

## 2024-11-21 PROBLEM — E66.813 CLASS 3 SEVERE OBESITY DUE TO EXCESS CALORIES WITHOUT SERIOUS COMORBIDITY WITH BODY MASS INDEX (BMI) OF 40.0 TO 44.9 IN ADULT (HCC): Status: ACTIVE | Noted: 2019-11-20

## 2024-11-21 PROBLEM — E66.01 CLASS 3 SEVERE OBESITY DUE TO EXCESS CALORIES WITHOUT SERIOUS COMORBIDITY WITH BODY MASS INDEX (BMI) OF 40.0 TO 44.9 IN ADULT (HCC): Status: ACTIVE | Noted: 2019-11-20

## 2024-11-21 NOTE — PATIENT INSTRUCTIONS
"Patient Education     Routine physical for adults   The Basics   Written by the doctors and editors at [facility]   What is a physical? -- A physical is a routine visit, or \"check-up,\" with your doctor. You might also hear it called a \"wellness visit\" or \"preventive visit.\"  During each visit, the doctor will:   Ask about your physical and mental health   Ask about your habits, behaviors, and lifestyle   Do an exam   Give you vaccines if needed   Talk to you about any medicines you take   Give advice about your health   Answer your questions  Getting regular check-ups is an important part of taking care of your health. It can help your doctor find and treat any problems you have. But it's also important for preventing health problems.  A routine physical is different from a \"sick visit.\" A sick visit is when you see a doctor because of a health concern or problem. Since physicals are scheduled ahead of time, you can think about what you want to ask the doctor.  How often should I get a physical? -- It depends on your age and health. In general, for people age 21 years and older:   If you are younger than 50 years, you might be able to get a physical every 3 years.   If you are 50 years or older, your doctor might recommend a physical every year.  If you have an ongoing health condition, like diabetes or high blood pressure, your doctor will probably want to see you more often.  What happens during a physical? -- In general, each visit will include:   Physical exam - The doctor or nurse will check your height, weight, heart rate, and blood pressure. They will also look at your eyes and ears. They will ask about how you are feeling and whether you have any symptoms that bother you.   Medicines - It's a good idea to bring a list of all the medicines you take to each doctor visit. Your doctor will talk to you about your medicines and answer any questions. Tell them if you are having any side effects that bother you. You " "should also tell them if you are having trouble paying for any of your medicines.   Habits and behaviors - This includes:   Your diet   Your exercise habits   Whether you smoke, drink alcohol, or use drugs   Whether you are sexually active   Whether you feel safe at home  Your doctor will talk to you about things you can do to improve your health and lower your risk of health problems. They will also offer help and support. For example, if you want to quit smoking, they can give you advice and might prescribe medicines. If you want to improve your diet or get more physical activity, they can help you with this, too.   Lab tests, if needed - The tests you get will depend on your age and situation. For example, your doctor might want to check your:   Cholesterol   Blood sugar   Iron level   Vaccines - The recommended vaccines will depend on your age, health, and what vaccines you already had. Vaccines are very important because they can prevent certain serious or deadly infections.   Discussion of screening - \"Screening\" means checking for diseases or other health problems before they cause symptoms. Your doctor can recommend screening based on your age, risk, and preferences. This might include tests to check for:   Cancer, such as breast, prostate, cervical, ovarian, colorectal, prostate, lung, or skin cancer   Sexually transmitted infections, such as chlamydia and gonorrhea   Mental health conditions like depression and anxiety  Your doctor will talk to you about the different types of screening tests. They can help you decide which screenings to have. They can also explain what the results might mean.   Answering questions - The physical is a good time to ask the doctor or nurse questions about your health. If needed, they can refer you to other doctors or specialists, too.  Adults older than 65 years often need other care, too. As you get older, your doctor will talk to you about:   How to prevent falling at " home   Hearing or vision tests   Memory testing   How to take your medicines safely   Making sure that you have the help and support you need at home  All topics are updated as new evidence becomes available and our peer review process is complete.  This topic retrieved from [employer] on: May 02, 2024.  Topic 090257 Version 1.0  Release: 32.4.3 - C32.122  © 2024 UpToDate, Inc. and/or its affiliates. All rights reserved.  Consumer Information Use and Disclaimer   Disclaimer: This generalized information is a limited summary of diagnosis, treatment, and/or medication information. It is not meant to be comprehensive and should be used as a tool to help the user understand and/or assess potential diagnostic and treatment options. It does NOT include all information about conditions, treatments, medications, side effects, or risks that may apply to a specific patient. It is not intended to be medical advice or a substitute for the medical advice, diagnosis, or treatment of a health care provider based on the health care provider's examination and assessment of a patient's specific and unique circumstances. Patients must speak with a health care provider for complete information about their health, medical questions, and treatment options, including any risks or benefits regarding use of medications. This information does not endorse any treatments or medications as safe, effective, or approved for treating a specific patient. UpToDate, Inc. and its affiliates disclaim any warranty or liability relating to this information or the use thereof.The use of this information is governed by the Terms of Use, available at https://www.woltersHelijiauwer.com/en/know/clinical-effectiveness-terms. 2024© UpToDate, Inc. and its affiliates and/or licensors. All rights reserved.  Copyright   © 2024 UpToDate, Inc. and/or its affiliates. All rights reserved.

## 2024-11-21 NOTE — ASSESSMENT & PLAN NOTE
BP Readings from Last 3 Encounters:   11/22/24 132/72   10/25/24 142/72   07/12/24 155/92   BP at goal. Goal of 140/90 mmHg  Current management: Lisinopril 5 mg daily  DASH diet, low  sodium (< 2g) and moderate exercise strongly advised    Plan:  Continue current regimen  Lifestyle modification as discussed

## 2024-11-21 NOTE — PROGRESS NOTES
Adult Annual Physical  Name: Darcie Reyes      : 1970      MRN: [id number]  Encounter Provider: Esteban Rockwell MD  Encounter Date: 2024   Encounter department: [facility]    Assessment & Plan  Annual physical exam  Patient is a 53-year-old female presenting today for annual physical exam.         Essential hypertension  BP Readings from Last 3 Encounters:   24 132/72   10/25/24 142/72   24 155/92   BP at goal. Goal of 140/90 mmHg  Current management: Lisinopril 5 mg daily  DASH diet, low  sodium (< 2g) and moderate exercise strongly advised    Plan:  Continue current regimen  Lifestyle modification as discussed         Class 3 severe obesity due to excess calories without serious comorbidity with body mass index (BMI) of 40.0 to 44.9 in adult (HCC)  Refer to BMI counseling       Thyroid disorder screening    Orders:    TSH, 3rd generation with Free T4 reflex; Future    Encounter for immunization    Orders:    influenza vaccine, recombinant, PF, 0.5 mL IM (Flublok)    Immunizations and preventive care screenings were discussed with patient today. Appropriate education was printed on patient's after visit summary.    Counseling:  Exercise: the importance of regular exercise/physical activity was discussed. Recommend exercise 3-5 times per week for at least 30 minutes.     BMI Counseling: Body mass index is 42.62 kg/m². The BMI is above normal. Nutrition recommendations include decreasing portion sizes, moderation in carbohydrate intake, increasing intake of lean protein and reducing intake of saturated and trans fat. Exercise recommendations include moderate physical activity 150 minutes/week. No pharmacotherapy was ordered. Rationale for BMI follow-up plan is due to patient being overweight or obese.         History of Present Illness     Adult Annual Physical:  Patient presents for annual physical. Patient is a 53-year-old female with PMH of hypertension  "presenting today for annual physical exam.  Overall reports doing well during visit. Topics/counseling discussed during visit outlined above.  Denies chest pain, SOB/GILLESPIE, abdominal discomfort, fatigue or palpitations.  Denies recent illnesses.  No constitutional symptoms.    Daughter present during visit..     Diet and Physical Activity:  - Diet/Nutrition: poor diet.  - Exercise: walking.    General Health:  - Sleep: > 8 hours of sleep on average.  - Hearing: normal hearing bilateral ears.  - Vision: wears glasses.  - Dental: brushes teeth once daily and no dental visits for > 1 year.    /GYN Health:    - Menopause: postmenopausal.   - History of STDs: no    Advanced Care Planning:  - Has an advanced directive?: no    - Has a durable medical POA?: yes    - ACP document given to patient?: no      Review of Systems   Constitutional:  Negative for fatigue.   Eyes:  Negative for visual disturbance.   Respiratory:  Negative for chest tightness and shortness of breath.    Cardiovascular:  Negative for chest pain, palpitations and leg swelling.   Gastrointestinal:  Negative for abdominal pain, nausea and vomiting.   Genitourinary:  Negative for pelvic pain and vaginal bleeding.   Musculoskeletal:  Negative for arthralgias and myalgias.   Skin:  Negative for color change.   Neurological:  Negative for weakness.   Psychiatric/Behavioral:  Negative for dysphoric mood.          Objective   /72 (BP Location: Left arm, Patient Position: Sitting, Cuff Size: Large)   Pulse 69   Temp 97.9 °F (36.6 °C) (Temporal)   Resp 18   Ht 5' 2\" (1.575 m)   Wt 106 kg (233 lb)   SpO2 96%   BMI 42.62 kg/m²     Physical Exam  Constitutional:       Appearance: Normal appearance. She is obese.   HENT:      Head: Normocephalic and atraumatic.      Right Ear: Tympanic membrane and ear canal normal.      Left Ear: Tympanic membrane and ear canal normal.      Mouth/Throat:      Mouth: Mucous membranes are moist.      Pharynx: Oropharynx " is clear.   Eyes:      Extraocular Movements: Extraocular movements intact.      Pupils: Pupils are equal, round, and reactive to light.   Cardiovascular:      Rate and Rhythm: Normal rate and regular rhythm.      Pulses: Normal pulses.      Heart sounds: Normal heart sounds. No murmur heard.  Pulmonary:      Effort: Pulmonary effort is normal.      Breath sounds: Normal breath sounds. No wheezing.   Abdominal:      Palpations: Abdomen is soft.      Comments: Obese   Musculoskeletal:         General: Normal range of motion.      Cervical back: Normal range of motion and neck supple.      Right lower leg: No edema.      Left lower leg: No edema.   Skin:     General: Skin is warm and dry.   Neurological:      Mental Status: She is alert.      Motor: No weakness.      Gait: Gait is intact.      Deep Tendon Reflexes:      Reflex Scores:       Patellar reflexes are 2+ on the right side and 2+ on the left side.  Psychiatric:         Mood and Affect: Mood normal.          No

## 2024-11-22 ENCOUNTER — OFFICE VISIT (OUTPATIENT)
Dept: FAMILY MEDICINE CLINIC | Facility: CLINIC | Age: 54
End: 2024-11-22

## 2024-11-22 VITALS
HEIGHT: 62 IN | OXYGEN SATURATION: 96 % | TEMPERATURE: 97.9 F | DIASTOLIC BLOOD PRESSURE: 72 MMHG | HEART RATE: 69 BPM | BODY MASS INDEX: 42.88 KG/M2 | RESPIRATION RATE: 18 BRPM | WEIGHT: 233 LBS | SYSTOLIC BLOOD PRESSURE: 132 MMHG

## 2024-11-22 DIAGNOSIS — Z13.29 THYROID DISORDER SCREENING: ICD-10-CM

## 2024-11-22 DIAGNOSIS — I10 ESSENTIAL HYPERTENSION: ICD-10-CM

## 2024-11-22 DIAGNOSIS — Z23 ENCOUNTER FOR IMMUNIZATION: ICD-10-CM

## 2024-11-22 DIAGNOSIS — Z00.00 ANNUAL PHYSICAL EXAM: Primary | ICD-10-CM

## 2024-11-22 DIAGNOSIS — E66.01 CLASS 3 SEVERE OBESITY DUE TO EXCESS CALORIES WITHOUT SERIOUS COMORBIDITY WITH BODY MASS INDEX (BMI) OF 40.0 TO 44.9 IN ADULT (HCC): ICD-10-CM

## 2024-11-22 DIAGNOSIS — E66.813 CLASS 3 SEVERE OBESITY DUE TO EXCESS CALORIES WITHOUT SERIOUS COMORBIDITY WITH BODY MASS INDEX (BMI) OF 40.0 TO 44.9 IN ADULT (HCC): ICD-10-CM

## 2024-11-22 PROCEDURE — 90471 IMMUNIZATION ADMIN: CPT | Performed by: FAMILY MEDICINE

## 2024-11-22 PROCEDURE — 99396 PREV VISIT EST AGE 40-64: CPT | Performed by: FAMILY MEDICINE

## 2024-11-22 PROCEDURE — 90673 RIV3 VACCINE NO PRESERV IM: CPT | Performed by: FAMILY MEDICINE

## 2024-12-02 ENCOUNTER — APPOINTMENT (OUTPATIENT)
Dept: LAB | Facility: CLINIC | Age: 54
End: 2024-12-02
Payer: COMMERCIAL

## 2024-12-02 DIAGNOSIS — Z13.29 THYROID DISORDER SCREENING: ICD-10-CM

## 2024-12-02 LAB — TSH SERPL DL<=0.05 MIU/L-ACNC: 1.68 UIU/ML (ref 0.45–4.5)

## 2024-12-02 PROCEDURE — 36415 COLL VENOUS BLD VENIPUNCTURE: CPT

## 2024-12-02 PROCEDURE — 84443 ASSAY THYROID STIM HORMONE: CPT

## 2025-02-10 ENCOUNTER — TELEPHONE (OUTPATIENT)
Dept: FAMILY MEDICINE CLINIC | Facility: CLINIC | Age: 55
End: 2025-02-10

## 2025-02-10 NOTE — TELEPHONE ENCOUNTER
Yes, my name is August. I'm calling from [facility] on behalf of this patient. Her name is Darcie Pederson and she told me that she received a call coming from you your facility that her insurance is being declined because may I know what's the reason why her insurance was being declined because the member is insurance is active on our end. If you can give this member or patient a call back at [phone], I will quickly appreciate it. Thank you.    Called patient back and tried to obtain information as to who called her. She stated the call back number they left on [employer] was , I did try to advise patient that this is not our office number, and she should call back. Patient kept on talking and could no longer hear me. Tried calling insurance back, was connected with [facility] then call got disconnected.

## 2025-02-11 ENCOUNTER — HOSPITAL ENCOUNTER (OUTPATIENT)
Dept: MAMMOGRAPHY | Facility: CLINIC | Age: 55
Discharge: HOME | End: 2025-02-11
Payer: COMMERCIAL

## 2025-02-11 VITALS — BODY MASS INDEX: 42.88 KG/M2 | HEIGHT: 62 IN | WEIGHT: 233 LBS

## 2025-02-11 DIAGNOSIS — R92.8 ABNORMAL MAMMOGRAM: ICD-10-CM

## 2025-02-11 PROCEDURE — G0279 TOMOSYNTHESIS, MAMMO: HCPCS

## 2025-02-11 PROCEDURE — 77066 DX MAMMO INCL CAD BI: CPT

## 2025-02-11 PROCEDURE — 76642 ULTRASOUND BREAST LIMITED: CPT

## 2025-02-24 ENCOUNTER — OFFICE VISIT (OUTPATIENT)
Dept: FAMILY MEDICINE CLINIC | Facility: CLINIC | Age: 55
End: 2025-02-24

## 2025-02-24 VITALS
DIASTOLIC BLOOD PRESSURE: 80 MMHG | BODY MASS INDEX: 43.02 KG/M2 | TEMPERATURE: 97.9 F | RESPIRATION RATE: 18 BRPM | SYSTOLIC BLOOD PRESSURE: 142 MMHG | HEART RATE: 85 BPM | OXYGEN SATURATION: 98 % | HEIGHT: 62 IN | WEIGHT: 233.8 LBS

## 2025-02-24 DIAGNOSIS — I10 ESSENTIAL HYPERTENSION: Primary | ICD-10-CM

## 2025-02-24 DIAGNOSIS — E66.813 CLASS 3 SEVERE OBESITY DUE TO EXCESS CALORIES WITHOUT SERIOUS COMORBIDITY WITH BODY MASS INDEX (BMI) OF 40.0 TO 44.9 IN ADULT (HCC): ICD-10-CM

## 2025-02-24 DIAGNOSIS — Z13.220 LIPID SCREENING: ICD-10-CM

## 2025-02-24 DIAGNOSIS — E66.01 CLASS 3 SEVERE OBESITY DUE TO EXCESS CALORIES WITHOUT SERIOUS COMORBIDITY WITH BODY MASS INDEX (BMI) OF 40.0 TO 44.9 IN ADULT (HCC): ICD-10-CM

## 2025-02-24 PROCEDURE — 99213 OFFICE O/P EST LOW 20 MIN: CPT | Performed by: FAMILY MEDICINE

## 2025-02-24 RX ORDER — BLOOD PRESSURE TEST KIT
KIT MISCELLANEOUS 2 TIMES DAILY
Qty: 1 KIT | Refills: 0 | Status: SHIPPED | OUTPATIENT
Start: 2025-02-24

## 2025-02-24 NOTE — ASSESSMENT & PLAN NOTE
BP Readings from Last 3 Encounters:   02/24/25 142/80   11/22/24 132/72   10/25/24 142/72   BP slightly above goal.  Goal<140/90 mmHg.  Asymptomatic as noted in HPI.  Current regimen: Lisinopril 5 mg daily and is compliant with regimen.  Low sodium (<2g/day), low carb, vegetables, whole grains, fruits advised  Moderate intensity exercise 150 minutes/week strongly encouraged  Patient to keep BP readings of a.m. and p.m. and present next visit.  Patient to complete CMP and ACR 1 week prior to next visit.    Plan:  Continue current regimen  Home BP monitoring  Lifestyle medication  DASH diet provided on AVS  Follow-up BP diary  Follow-up lab work      Orders:    Blood Pressure KIT; Use 2 (two) times a day    Comprehensive metabolic panel; Future    Albumin / creatinine urine ratio; Future

## 2025-02-24 NOTE — PROGRESS NOTES
Name: Darcie Reyes      : 1970      MRN: [id number]  Encounter Provider: Esteban Rockwell MD  Encounter Date: 2025   Encounter department: [facility]  :  Assessment & Plan  Essential hypertension  BP Readings from Last 3 Encounters:   25 142/80   24 132/72   10/25/24 142/72   BP slightly above goal.  Goal<140/90 mmHg.  Asymptomatic as noted in HPI.  Current regimen: Lisinopril 5 mg daily and is compliant with regimen.  Low sodium (<2g/day), low carb, vegetables, whole grains, fruits advised  Moderate intensity exercise 150 minutes/week strongly encouraged  Patient to keep BP readings of a.m. and p.m. and present next visit.  Patient to complete CMP and ACR 1 week prior to next visit.    Plan:  Continue current regimen  Home BP monitoring  Lifestyle medication  DASH diet provided on AVS  Follow-up BP diary  Follow-up lab work      Orders:    Blood Pressure KIT; Use 2 (two) times a day    Comprehensive metabolic panel; Future    Albumin / creatinine urine ratio; Future    Class 3 severe obesity due to excess calories without serious comorbidity with body mass index (BMI) of 40.0 to 44.9 in adult (HCC)  Refer to BMI counseling         Lipid screening    Orders:    Lipid Panel with Direct LDL reflex; Future        BMI Counseling: Body mass index is 42.76 kg/m². The BMI is above normal. Nutrition recommendations include decreasing portion sizes, encouraging healthy choices of fruits and vegetables, moderation in carbohydrate intake and increasing intake of lean protein. Exercise recommendations include moderate physical activity 150 minutes/week. Rationale for BMI follow-up plan is due to patient being overweight or obese.       History of Present Illness   Patient is a 54-year-old female with PMH of essential hypertension presenting today for follow-up.  Overall reports doing well since last visit.  Reports no recent illnesses or hospitalizations.  Is adherent  "to hypertension regimen taking medications as prescribed.  Denies chest pain, headaches, dizziness, palpitations or fatigue.  Reports no abdominal discomfort, diarrhea, nausea or vomiting.    [employer]  used during encounter.  ID # [id number]        Review of Systems   Constitutional:  Negative for fatigue.   Eyes:  Negative for visual disturbance.   Respiratory:  Negative for chest tightness and shortness of breath.    Cardiovascular:  Negative for chest pain, palpitations and leg swelling.   Gastrointestinal:  Negative for abdominal pain, diarrhea, nausea and vomiting.   Musculoskeletal:  Negative for arthralgias and myalgias.   Skin: Negative.    Neurological:  Negative for dizziness, weakness, light-headedness, numbness and headaches.   Psychiatric/Behavioral:  Negative for dysphoric mood.        Objective   /80 (BP Location: Right arm, Patient Position: Sitting, Cuff Size: Large)   Pulse 85   Temp 97.9 °F (36.6 °C) (Temporal)   Resp 18   Ht 5' 2\" (1.575 m)   Wt 106 kg (233 lb 12.8 oz)   SpO2 98%   BMI 42.76 kg/m²      Physical Exam  Constitutional:       Appearance: Normal appearance. She is obese.   HENT:      Head: Normocephalic and atraumatic.      Right Ear: Tympanic membrane and ear canal normal.      Left Ear: Tympanic membrane and ear canal normal.      Mouth/Throat:      Mouth: Mucous membranes are moist.      Pharynx: Oropharynx is clear.   Eyes:      Extraocular Movements: Extraocular movements intact.      Pupils: Pupils are equal, round, and reactive to light.   Cardiovascular:      Rate and Rhythm: Normal rate and regular rhythm.      Pulses: Normal pulses.      Heart sounds: Normal heart sounds. No murmur heard.  Pulmonary:      Effort: Pulmonary effort is normal.      Breath sounds: Normal breath sounds.   Abdominal:      Palpations: Abdomen is soft.      Tenderness: There is no abdominal tenderness.   Musculoskeletal:         General: Normal range of motion.      Cervical back: " Normal range of motion and neck supple.      Right lower leg: No edema.      Left lower leg: No edema.   Skin:     General: Skin is warm and dry.   Neurological:      Mental Status: She is alert.      Motor: No weakness.   Psychiatric:         Mood and Affect: Mood normal.

## 2025-04-04 ENCOUNTER — HOSPITAL ENCOUNTER (INPATIENT)
Facility: HOSPITAL | Age: 55
LOS: 6 days | Discharge: HOME | DRG: 444 | End: 2025-04-10
Attending: EMERGENCY MEDICINE | Admitting: INTERNAL MEDICINE
Payer: COMMERCIAL

## 2025-04-04 ENCOUNTER — APPOINTMENT (EMERGENCY)
Dept: CT IMAGING | Facility: HOSPITAL | Age: 55
DRG: 444 | End: 2025-04-04
Payer: COMMERCIAL

## 2025-04-04 DIAGNOSIS — K80.20 CHOLELITHIASIS: ICD-10-CM

## 2025-04-04 DIAGNOSIS — K85.90 PANCREATITIS, ACUTE: Primary | ICD-10-CM

## 2025-04-04 DIAGNOSIS — R05.1 ACUTE COUGH: ICD-10-CM

## 2025-04-04 PROBLEM — R74.01 TRANSAMINITIS: Status: ACTIVE | Noted: 2025-04-04

## 2025-04-04 PROBLEM — K85.00 IDIOPATHIC ACUTE PANCREATITIS WITHOUT INFECTION OR NECROSIS: Status: ACTIVE | Noted: 2025-04-04

## 2025-04-04 PROBLEM — K85.10 GALLSTONE PANCREATITIS: Status: ACTIVE | Noted: 2025-04-04

## 2025-04-04 PROBLEM — K76.0 HEPATIC STEATOSIS: Status: ACTIVE | Noted: 2025-04-04

## 2025-04-04 LAB
ALBUMIN SERPL BCG-MCNC: 4.4 G/DL (ref 3.5–5)
ALP SERPL-CCNC: 204 U/L (ref 34–104)
ALT SERPL W P-5'-P-CCNC: 185 U/L (ref 7–52)
ANION GAP SERPL CALCULATED.3IONS-SCNC: 10 MMOL/L (ref 4–13)
AST SERPL W P-5'-P-CCNC: 229 U/L (ref 13–39)
ATRIAL RATE: 90 BPM
BASOPHILS # BLD MANUAL: 0.17 THOUSAND/UL (ref 0–0.1)
BASOPHILS NFR MAR MANUAL: 1 % (ref 0–1)
BILIRUB SERPL-MCNC: 0.83 MG/DL (ref 0.2–1)
BUN SERPL-MCNC: 14 MG/DL (ref 5–25)
CALCIUM SERPL-MCNC: 9.7 MG/DL (ref 8.4–10.2)
CARDIAC TROPONIN I PNL SERPL HS: <2 NG/L (ref ?–50)
CHLORIDE SERPL-SCNC: 104 MMOL/L (ref 96–108)
CHOLEST SERPL-MCNC: 210 MG/DL (ref ?–200)
CO2 SERPL-SCNC: 25 MMOL/L (ref 21–32)
CREAT SERPL-MCNC: 0.84 MG/DL (ref 0.6–1.3)
EOSINOPHIL # BLD MANUAL: 0.17 THOUSAND/UL (ref 0–0.4)
EOSINOPHIL NFR BLD MANUAL: 1 % (ref 0–6)
ERYTHROCYTE [DISTWIDTH] IN BLOOD BY AUTOMATED COUNT: 13.2 % (ref 11.6–15.1)
GFR SERPL CREATININE-BSD FRML MDRD: 78 ML/MIN/1.73SQ M
GLUCOSE SERPL-MCNC: 167 MG/DL (ref 65–140)
HCT VFR BLD AUTO: 44.7 % (ref 34.8–46.1)
HDLC SERPL-MCNC: 58 MG/DL
HGB BLD-MCNC: 14.8 G/DL (ref 11.5–15.4)
IGA SERPL-MCNC: 467 MG/DL (ref 66–433)
IGG SERPL-MCNC: 1527 MG/DL (ref 635–1741)
IGM SERPL-MCNC: 51 MG/DL (ref 45–281)
LDLC SERPL CALC-MCNC: 114 MG/DL (ref 0–100)
LG PLATELETS BLD QL SMEAR: PRESENT
LIPASE SERPL-CCNC: ABNORMAL U/L (ref 11–82)
LYMPHOCYTES # BLD AUTO: 27 % (ref 14–44)
LYMPHOCYTES # BLD AUTO: 5.24 THOUSAND/UL (ref 0.6–4.47)
MAGNESIUM SERPL-MCNC: 2 MG/DL (ref 1.9–2.7)
MCH RBC QN AUTO: 30.6 PG (ref 26.8–34.3)
MCHC RBC AUTO-ENTMCNC: 33.1 G/DL (ref 31.4–37.4)
MCV RBC AUTO: 93 FL (ref 82–98)
MONOCYTES # BLD AUTO: 0.68 THOUSAND/UL (ref 0–1.22)
MONOCYTES NFR BLD: 4 % (ref 4–12)
NEUTROPHILS # BLD MANUAL: 10.64 THOUSAND/UL (ref 1.85–7.62)
NEUTS SEG NFR BLD AUTO: 63 % (ref 43–75)
P AXIS: 47 DEGREES
PLATELET # BLD AUTO: 223 THOUSANDS/UL (ref 149–390)
PLATELET BLD QL SMEAR: ADEQUATE
PMV BLD AUTO: 12.7 FL (ref 8.9–12.7)
POTASSIUM SERPL-SCNC: 3.8 MMOL/L (ref 3.5–5.3)
PR INTERVAL: 142 MS
PROT SERPL-MCNC: 8 G/DL (ref 6.4–8.4)
QRS AXIS: -41 DEGREES
QRSD INTERVAL: 84 MS
QT INTERVAL: 360 MS
QTC INTERVAL: 440 MS
RBC # BLD AUTO: 4.83 MILLION/UL (ref 3.81–5.12)
RBC MORPH BLD: PRESENT
SODIUM SERPL-SCNC: 139 MMOL/L (ref 135–147)
T WAVE AXIS: 44 DEGREES
TRIGL SERPL-MCNC: 190 MG/DL (ref ?–150)
VARIANT LYMPHS # BLD AUTO: 4 %
VENTRICULAR RATE: 90 BPM
WBC # BLD AUTO: 16.89 THOUSAND/UL (ref 4.31–10.16)

## 2025-04-04 PROCEDURE — 96361 HYDRATE IV INFUSION ADD-ON: CPT

## 2025-04-04 PROCEDURE — 93010 ELECTROCARDIOGRAM REPORT: CPT | Performed by: STUDENT IN AN ORGANIZED HEALTH CARE EDUCATION/TRAINING PROGRAM

## 2025-04-04 PROCEDURE — 83690 ASSAY OF LIPASE: CPT

## 2025-04-04 PROCEDURE — 99222 1ST HOSP IP/OBS MODERATE 55: CPT | Performed by: INTERNAL MEDICINE

## 2025-04-04 PROCEDURE — 83735 ASSAY OF MAGNESIUM: CPT | Performed by: NURSE PRACTITIONER

## 2025-04-04 PROCEDURE — 36415 COLL VENOUS BLD VENIPUNCTURE: CPT

## 2025-04-04 PROCEDURE — 74177 CT ABD & PELVIS W/CONTRAST: CPT

## 2025-04-04 PROCEDURE — 99223 1ST HOSP IP/OBS HIGH 75: CPT | Performed by: SURGERY

## 2025-04-04 PROCEDURE — 80053 COMPREHEN METABOLIC PANEL: CPT

## 2025-04-04 PROCEDURE — 96374 THER/PROPH/DIAG INJ IV PUSH: CPT

## 2025-04-04 PROCEDURE — 80061 LIPID PANEL: CPT | Performed by: NURSE PRACTITIONER

## 2025-04-04 PROCEDURE — 93005 ELECTROCARDIOGRAM TRACING: CPT

## 2025-04-04 PROCEDURE — 82784 ASSAY IGA/IGD/IGG/IGM EACH: CPT | Performed by: STUDENT IN AN ORGANIZED HEALTH CARE EDUCATION/TRAINING PROGRAM

## 2025-04-04 PROCEDURE — 96375 TX/PRO/DX INJ NEW DRUG ADDON: CPT

## 2025-04-04 PROCEDURE — 99285 EMERGENCY DEPT VISIT HI MDM: CPT

## 2025-04-04 PROCEDURE — 85007 BL SMEAR W/DIFF WBC COUNT: CPT

## 2025-04-04 PROCEDURE — 84484 ASSAY OF TROPONIN QUANT: CPT

## 2025-04-04 PROCEDURE — 99284 EMERGENCY DEPT VISIT MOD MDM: CPT

## 2025-04-04 PROCEDURE — 85027 COMPLETE CBC AUTOMATED: CPT

## 2025-04-04 RX ORDER — HYDROMORPHONE HCL/PF 1 MG/ML
1 SYRINGE (ML) INJECTION EVERY 4 HOURS PRN
Status: DISCONTINUED | OUTPATIENT
Start: 2025-04-04 | End: 2025-04-10 | Stop reason: HOSPADM

## 2025-04-04 RX ORDER — HYDRALAZINE HYDROCHLORIDE 20 MG/ML
5 INJECTION INTRAMUSCULAR; INTRAVENOUS EVERY 6 HOURS PRN
Status: DISCONTINUED | OUTPATIENT
Start: 2025-04-04 | End: 2025-04-07

## 2025-04-04 RX ORDER — HYDROMORPHONE HCL/PF 1 MG/ML
0.5 SYRINGE (ML) INJECTION EVERY 4 HOURS PRN
Status: DISCONTINUED | OUTPATIENT
Start: 2025-04-04 | End: 2025-04-04

## 2025-04-04 RX ORDER — MAGNESIUM HYDROXIDE/ALUMINUM HYDROXICE/SIMETHICONE 120; 1200; 1200 MG/30ML; MG/30ML; MG/30ML
30 SUSPENSION ORAL EVERY 6 HOURS PRN
Status: DISCONTINUED | OUTPATIENT
Start: 2025-04-04 | End: 2025-04-10 | Stop reason: HOSPADM

## 2025-04-04 RX ORDER — POLYETHYLENE GLYCOL 3350 17 G/17G
17 POWDER, FOR SOLUTION ORAL DAILY PRN
Status: DISCONTINUED | OUTPATIENT
Start: 2025-04-04 | End: 2025-04-08

## 2025-04-04 RX ORDER — MORPHINE SULFATE 4 MG/ML
4 INJECTION, SOLUTION INTRAMUSCULAR; INTRAVENOUS ONCE
Status: COMPLETED | OUTPATIENT
Start: 2025-04-04 | End: 2025-04-04

## 2025-04-04 RX ORDER — OXYCODONE HYDROCHLORIDE 5 MG/1
5 TABLET ORAL EVERY 6 HOURS PRN
Refills: 0 | Status: DISCONTINUED | OUTPATIENT
Start: 2025-04-04 | End: 2025-04-04

## 2025-04-04 RX ORDER — FAMOTIDINE 10 MG/ML
20 INJECTION, SOLUTION INTRAVENOUS ONCE
Status: COMPLETED | OUTPATIENT
Start: 2025-04-04 | End: 2025-04-04

## 2025-04-04 RX ORDER — OXYCODONE HYDROCHLORIDE 5 MG/1
5 TABLET ORAL EVERY 4 HOURS PRN
Refills: 0 | Status: DISCONTINUED | OUTPATIENT
Start: 2025-04-04 | End: 2025-04-07

## 2025-04-04 RX ORDER — SODIUM CHLORIDE 9 MG/ML
200 INJECTION, SOLUTION INTRAVENOUS CONTINUOUS
Status: DISPENSED | OUTPATIENT
Start: 2025-04-04 | End: 2025-04-04

## 2025-04-04 RX ORDER — SIMETHICONE 80 MG
80 TABLET,CHEWABLE ORAL 4 TIMES DAILY PRN
Status: DISCONTINUED | OUTPATIENT
Start: 2025-04-04 | End: 2025-04-10 | Stop reason: HOSPADM

## 2025-04-04 RX ORDER — ACETAMINOPHEN 325 MG/1
975 TABLET ORAL EVERY 6 HOURS PRN
Status: DISCONTINUED | OUTPATIENT
Start: 2025-04-04 | End: 2025-04-06

## 2025-04-04 RX ORDER — ONDANSETRON 2 MG/ML
4 INJECTION INTRAMUSCULAR; INTRAVENOUS EVERY 6 HOURS PRN
Status: DISCONTINUED | OUTPATIENT
Start: 2025-04-04 | End: 2025-04-10 | Stop reason: HOSPADM

## 2025-04-04 RX ORDER — ENOXAPARIN SODIUM 100 MG/ML
40 INJECTION SUBCUTANEOUS DAILY
Status: DISCONTINUED | OUTPATIENT
Start: 2025-04-04 | End: 2025-04-10 | Stop reason: HOSPADM

## 2025-04-04 RX ORDER — KETOROLAC TROMETHAMINE 30 MG/ML
30 INJECTION, SOLUTION INTRAMUSCULAR; INTRAVENOUS EVERY 6 HOURS PRN
Status: DISCONTINUED | OUTPATIENT
Start: 2025-04-04 | End: 2025-04-04

## 2025-04-04 RX ORDER — HYDROMORPHONE HCL IN WATER/PF 6 MG/30 ML
0.2 PATIENT CONTROLLED ANALGESIA SYRINGE INTRAVENOUS EVERY 4 HOURS PRN
Status: DISCONTINUED | OUTPATIENT
Start: 2025-04-04 | End: 2025-04-04

## 2025-04-04 RX ORDER — LISINOPRIL 5 MG/1
5 TABLET ORAL DAILY
Status: DISCONTINUED | OUTPATIENT
Start: 2025-04-05 | End: 2025-04-10 | Stop reason: HOSPADM

## 2025-04-04 RX ORDER — SODIUM CHLORIDE 9 MG/ML
150 INJECTION, SOLUTION INTRAVENOUS CONTINUOUS
Status: DISCONTINUED | OUTPATIENT
Start: 2025-04-04 | End: 2025-04-04

## 2025-04-04 RX ORDER — SODIUM CHLORIDE, SODIUM LACTATE, POTASSIUM CHLORIDE, CALCIUM CHLORIDE 600; 310; 30; 20 MG/100ML; MG/100ML; MG/100ML; MG/100ML
150 INJECTION, SOLUTION INTRAVENOUS CONTINUOUS
Status: DISCONTINUED | OUTPATIENT
Start: 2025-04-04 | End: 2025-04-05

## 2025-04-04 RX ADMIN — SODIUM CHLORIDE 1000 ML: 0.9 INJECTION, SOLUTION INTRAVENOUS at 01:10

## 2025-04-04 RX ADMIN — SODIUM CHLORIDE, SODIUM LACTATE, POTASSIUM CHLORIDE, AND CALCIUM CHLORIDE 150 ML/HR: .6; .31; .03; .02 INJECTION, SOLUTION INTRAVENOUS at 20:40

## 2025-04-04 RX ADMIN — SODIUM CHLORIDE 150 ML/HR: 0.9 INJECTION, SOLUTION INTRAVENOUS at 08:46

## 2025-04-04 RX ADMIN — SODIUM CHLORIDE, SODIUM LACTATE, POTASSIUM CHLORIDE, AND CALCIUM CHLORIDE 150 ML/HR: .6; .31; .03; .02 INJECTION, SOLUTION INTRAVENOUS at 13:13

## 2025-04-04 RX ADMIN — HYDROMORPHONE HYDROCHLORIDE 0.5 MG: 1 INJECTION, SOLUTION INTRAMUSCULAR; INTRAVENOUS; SUBCUTANEOUS at 05:40

## 2025-04-04 RX ADMIN — MORPHINE SULFATE 4 MG: 4 INJECTION INTRAVENOUS at 01:10

## 2025-04-04 RX ADMIN — OXYCODONE 5 MG: 5 TABLET ORAL at 19:40

## 2025-04-04 RX ADMIN — OXYCODONE 5 MG: 5 TABLET ORAL at 14:41

## 2025-04-04 RX ADMIN — SODIUM CHLORIDE 200 ML/HR: 0.9 INJECTION, SOLUTION INTRAVENOUS at 04:32

## 2025-04-04 RX ADMIN — FAMOTIDINE 20 MG: 10 INJECTION, SOLUTION INTRAVENOUS at 01:11

## 2025-04-04 RX ADMIN — HYDROMORPHONE HYDROCHLORIDE 1 MG: 1 INJECTION, SOLUTION INTRAMUSCULAR; INTRAVENOUS; SUBCUTANEOUS at 16:48

## 2025-04-04 RX ADMIN — HYDROMORPHONE HYDROCHLORIDE 1 MG: 1 INJECTION, SOLUTION INTRAMUSCULAR; INTRAVENOUS; SUBCUTANEOUS at 20:44

## 2025-04-04 RX ADMIN — KETOROLAC TROMETHAMINE 30 MG: 30 INJECTION, SOLUTION INTRAMUSCULAR; INTRAVENOUS at 03:40

## 2025-04-04 RX ADMIN — HYDROMORPHONE HYDROCHLORIDE 1 MG: 1 INJECTION, SOLUTION INTRAMUSCULAR; INTRAVENOUS; SUBCUTANEOUS at 09:09

## 2025-04-04 RX ADMIN — IOHEXOL 100 ML: 350 INJECTION, SOLUTION INTRAVENOUS at 01:51

## 2025-04-04 NOTE — ASSESSMENT & PLAN NOTE
CT scan:   Acute uncomplicated pancreatitis  Cholelithiasis without cholecystitis  Lipase: >12,000  NPO, IV hydration  Pain control  PRN Antiemetics  Trend serum lipase  FLP to assess triglycerides  Consult surgery and GI

## 2025-04-04 NOTE — PLAN OF CARE
Problem: PAIN - ADULT  Goal: Verbalizes/displays adequate comfort level or baseline comfort level  Description: Interventions:- Encourage patient to monitor pain and request assistance- Assess pain using appropriate pain scale- Administer analgesics based on type and severity of pain and evaluate response- Implement non-pharmacological measures as appropriate and evaluate response- Consider cultural and social influences on pain and pain management- Notify physician/advanced practitioner if interventions unsuccessful or patient reports new pain  Outcome: Progressing     Problem: DISCHARGE PLANNING  Goal: Discharge to home or other facility with appropriate resources  Description: INTERVENTIONS:- Identify barriers to discharge w/patient and caregiver- Arrange for needed discharge resources and transportation as appropriate- Identify discharge learning needs (meds, wound care, etc.)- Arrange for interpretive services to assist at discharge as needed- Refer to Case Management Department for coordinating discharge planning if the patient needs post-hospital services based on physician/advanced practitioner order or complex needs related to functional status, cognitive ability, or social support system  Outcome: Progressing     Problem: GASTROINTESTINAL - ADULT  Goal: Minimal or absence of nausea and/or vomiting  Description: INTERVENTIONS:- Administer IV fluids if ordered to ensure adequate hydration- Maintain NPO status until nausea and vomiting are resolved- Nasogastric tube if ordered- Administer ordered antiemetic medications as needed- Provide nonpharmacologic comfort measures as appropriate- Advance diet as tolerated, if ordered- Consider nutrition services referral to assist patient with adequate nutrition and appropriate food choices  Outcome: Progressing

## 2025-04-04 NOTE — CONSULTS
Consultation - Gastroenterology   Name: Darcie Reyes 54 y.o. female I MRN: [id number]  Unit/Bed#: E2 -01 I Date of Admission: 4/4/2025   Date of Service: 4/4/2025 I Hospital Day: 0       Inpatient consult to gastroenterology     Date/Time  4/4/2025 8:27 AM     Performed by  Tati Collado DO   Authorized by  RANGEL Hairston           Physician Requesting Evaluation: Carlo Villeda DO   Reason for Evaluation / Principal Problem: Pancreatitis    Assessment & Plan  Gallstone pancreatitis  54-year-old female with past medical history of NELLI, hypertension, obesity, and myositis who presented to ED with back/abdominal pain with nausea/vomiting.  Found to have an elevated lipase level of greater than 12,000 and along with LFT abnormalities.  CT imaging demonstrating acute interstitial pancreatitis along with cholelithiasis but no evidence of cholecystitis or choledocholithiasis.  Patient without prior alcohol, tobacco, or marijuana use.  Medications reviewed.  Triglycerides normal and IgG pending.  Etiology of pancreatitis likely gallstone induced unfortunately patient without evidence of complication.  Will therefore require symptomatic management but recommend cholecystectomy prior to discharge.    Plan:   Aggressive IVF hydration; continue LR @ 150 cc/hr   Monitor urine output along with BUN/sCr; maintain UO of >0.5 ml/kg/hour  Current diet NPO; will advance to clear liquid diet  Follow-up results of IgG levels  Consider enteral feeding with NG/NJ tube placement in 72 hours if patient unable to tolerate PO  If worsening abdominal pain or persistent organ failure, consider repeat CT imaging in 72 hours  Monitor off antibiotics; monitor WBC and trend fever curve  No indication to trend Lipase levels   General Surgery consulted, plan for cholecystectomy this admission  PRN pain and anti-emetics; additional symptom management per primary team   Transaminitis  Patient with elevation in serum transaminases.   AST//185, , and total bilirubin 0.83.  No evidence of cholecystitis or choledocholithiasis.  Continue to monitor and trend LFTs to ensure improvement.  Cholelithiasis  CT imaging demonstrating cholelithiasis with no evidence of acute cholecystitis.  Likely cause of presenting pancreatitis.  Plan for cholecystectomy this admission.  Please see additional management under gallstone pancreatitis as above.  Hepatic steatosis  Patient with CT imaging demonstrating hepatic steatosis.  Patient with multiple metabolic risk factors including hypertension, hyperlipidemia, and obesity placing patient at higher risk.  Patient with LFT elevations currently in setting of acute gallstone pancreatitis, the patient also with minor elevations in serum transaminases and alkaline phosphatase in the past.  Platelets normal.  Therefore, recommend outpatient GI follow-up for consideration of ultrasound elastography to further risk stratify and evaluate for advanced fibrosis.  Also recommend follow-up with PCP for management of metabolic risk factors.    Please contact the SecureChat role for the Gastroenterology service with any questions/concerns.    History of Present Illness   HPI:  Darcie Reyes is a 54 y.o. female with a past medical history of NELLI, hypertension, obesity, myositis who presented to [facility] ED with complaints of sudden onset back and abdominal pain.  Symptoms started day prior to admission.  Admits to having severe back pain that radiated to epigastric region.  Associated with nausea/vomiting.  No fever/chills.  Given severe pain, patient presented to the ED for further evaluation.    At time of ED arrival, patient hemodynamically stable and afebrile.  Labs demonstrating a WBC of 16.89.  Lipase level greater than 12,000.  LFTs as follows: AST//185, , and total bilirubin 0.83.  Triglyceride level 190.  CT abdomen/pelvis with contrast was completed and revealed acute uncomplicated pancreatitis.   Cholelithiasis without cholecystitis seen and patient with normal biliary tree.  Hepatic steatosis noted.    Patient seen and evaluated at time of consultation.  Patient sitting in bed comfortably in no acute distress or visible discomfort.  Pain persists but slightly improved.  No nausea/vomiting.  Admits to lack of appetite.  Offers no additional complaints at this time.    Review of Systems  Medical History Review: I have reviewed the patient's PMH, PSH, Social History, Family History, Meds, and Allergies   Historical Information   Past Medical History:   Diagnosis Date    Gallstone pancreatitis 04/04/2025    Myositis 11/20/2019     History reviewed. No pertinent surgical history.  Social History     Tobacco Use    Smoking status: Never     Passive exposure: Never    Smokeless tobacco: Never   Vaping Use    Vaping status: Never Used   Substance and Sexual Activity    Alcohol use: Never    Drug use: Never    Sexual activity: Not on file     E-Cigarette/Vaping    E-Cigarette Use Never User      E-Cigarette/Vaping Substances    Nicotine No     THC No     CBD No     Flavoring No     Other No     Unknown No      Family history non-contributory  Social History     Tobacco Use    Smoking status: Never     Passive exposure: Never    Smokeless tobacco: Never   Vaping Use    Vaping status: Never Used   Substance and Sexual Activity    Alcohol use: Never    Drug use: Never    Sexual activity: Not on file       Current Facility-Administered Medications:     acetaminophen (TYLENOL) tablet 975 mg, Q6H PRN    aluminum-magnesium hydroxide-simethicone (MAALOX) oral suspension 30 mL, Q6H PRN    enoxaparin (LOVENOX) subcutaneous injection 40 mg, Daily    HYDROmorphone (DILAUDID) injection 1 mg, Q4H PRN    iohexol (OMNIPAQUE) 350 MG/ML injection (MULTI-DOSE) 100 mL, Once in imaging    lactated ringers infusion, Continuous    ondansetron (ZOFRAN) injection 4 mg, Q6H PRN    oxyCODONE (ROXICODONE) IR tablet 5 mg, Q4H PRN     polyethylene glycol (MIRALAX) packet 17 g, Daily PRN    simethicone (MYLICON) chewable tablet 80 mg, 4x Daily PRN  Prior to Admission Medications   Prescriptions Last Dose Informant Patient Reported? Taking?   Blood Pressure KIT   No No   Sig: Use 2 (two) times a day   benzonatate (TESSALON PERLES) 100 mg capsule   No No   Sig: Take 1 capsule (100 mg total) by mouth 3 (three) times a day as needed for cough   lisinopril (ZESTRIL) 5 mg tablet   No Yes   Sig: Take 1 tablet (5 mg total) by mouth daily      Facility-Administered Medications: None     Patient has no known allergies.    Objective :  Temp:  [97 °F (36.1 °C)-98.4 °F (36.9 °C)] 97 °F (36.1 °C)  HR:  [81-94] 94  BP: (128-160)/(63-93) 148/92  Resp:  [17-20] 17  SpO2:  [94 %-98 %] 95 %  O2 Device: None (Room air)    Physical Exam  Constitutional:       General: She is not in acute distress.     Appearance: She is obese. She is not ill-appearing or toxic-appearing.   HENT:      Head: Normocephalic.      Nose: Nose normal. No congestion.   Eyes:      General: No scleral icterus.  Cardiovascular:      Rate and Rhythm: Normal rate.      Pulses: Normal pulses.   Pulmonary:      Effort: Pulmonary effort is normal. No respiratory distress.   Abdominal:      General: Abdomen is flat. There is no distension.      Palpations: Abdomen is soft.      Tenderness: There is abdominal tenderness in the epigastric area. There is no guarding or rebound.   Musculoskeletal:      Cervical back: Normal range of motion.   Skin:     General: Skin is warm.      Capillary Refill: Capillary refill takes less than 2 seconds.      Coloration: Skin is not pale.   Neurological:      Mental Status: She is alert and oriented to person, place, and time. Mental status is at baseline.   Psychiatric:         Mood and Affect: Mood normal.         Behavior: Behavior normal.           Lab Results: I have reviewed the following results:CBC/BMP:   .     04/04/25  0114   WBC 16.89*   HGB 14.8   HCT 44.7       SODIUM 139   K 3.8      CO2 25   BUN 14   CREATININE 0.84   GLUC 167*   MG 2.0    , Creatinine Clearance: Estimated Creatinine Clearance: 87.1 mL/min (by C-G formula based on SCr of 0.84 mg/dL)., LFTs:   .     04/04/25  0114   *   *   ALB 4.4   TBILI 0.83   ALKPHOS 204*    , PTT/INR:No new results in last 24 hours.     Imaging Results Review: I reviewed radiology reports from this admission including: CT abdomen/pelvis.  Other Study Results Review: No additional pertinent studies reviewed.

## 2025-04-04 NOTE — ASSESSMENT & PLAN NOTE
History of hypertension obesity hyperlipidemia presented to the hospital for abdominal pain and nausea/vomiting  Symptoms consistent with gallstone pancreatitis  Seen by gastroenterology and surgery  Currently on CLD with IV fluids  Surgery anticipating cholecystectomy this admission  Due to rapidly improving pancreatitis and increased work of breathing/hypoxia will discontinue fluids for now.    Results from last 7 days   Lab Units 04/05/25  0537 04/04/25  0114   LIPASE u/L 820* >12,000*

## 2025-04-04 NOTE — ASSESSMENT & PLAN NOTE
Transaminitis secondary to suspected gallstone pancreatitis    Results from last 7 days   Lab Units 04/05/25  0537 04/04/25  0114   AST U/L 73* 229*   ALT U/L 139* 185*   TOTAL BILIRUBIN mg/dL 0.88 0.83

## 2025-04-04 NOTE — CONSULTS
Consultation - Surgery-General   Name: Darcie Reyes 54 y.o. female I MRN: [id number]  Unit/Bed#: E2 -01 I Date of Admission: 4/4/2025   Date of Service: 4/4/2025 I Hospital Day: 0   Inpatient consult to Acute Care Surgery  Consult performed by: Mari Avitia MD  Consult ordered by: RANGEL Hairston        Physician Requesting Evaluation: Eron Vasquez MD   Reason for Evaluation / Principal Problem: Gallstone pancreatitis    Assessment & Plan  Gallstone pancreatitis  53 yo female with abdominal pain. Hx of cholelithiasis, NELLI, obesity, HTN & myositis. Sudden onset of back pain overnight, no relief with Advil. Reports nausea, vomiting and difficulty ambulating due to pain. VS stable on RA. On exam, mild periumbilical tenderness, non-distended. Wbc 16.89. Hb 14.8, Cr 0.84, T bili 0.83, AST//185, , Lipase >12,000, . CTAP shows acute uncomplicated pancreatitis, cholelithiasis without findings of acute cholecystitis.     Concerns for gallstone pancreatitis.     Plan  -Plan for Lap cholecystectomy during this admission.  -F/u GI consultation  -IV fluid hydration  -Pain/nausea control PRN  -Rest of care per primary team.   Surgery-General service will follow.    History of Present Illness   Darcie Reyes is a 54 y.o. female who presents with severe abdominal pain.Sudden onset of back pain overnight, no relief with Advil. Reports nausea, vomiting and difficulty ambulating due to pain.  No history of smoking or drinking.  Surgical history of 3 C-sections.  Not on anticoagulation. Hx of cholelithiasis, NELLI, obesity, HTN & myositis.    Review of Systems  See above.    Historical Information   Past Medical History:   Diagnosis Date    Myositis 11/20/2019     History reviewed. No pertinent surgical history.  Social History     Tobacco Use    Smoking status: Never     Passive exposure: Never    Smokeless tobacco: Never   Vaping Use    Vaping status: Never Used   Substance and Sexual  Activity    Alcohol use: Never    Drug use: Never    Sexual activity: Not on file     E-Cigarette/Vaping    E-Cigarette Use Never User      E-Cigarette/Vaping Substances    Nicotine No     THC No     CBD No     Flavoring No     Other No     Unknown No      Family history non-contributory  Social History     Tobacco Use    Smoking status: Never     Passive exposure: Never    Smokeless tobacco: Never   Vaping Use    Vaping status: Never Used   Substance and Sexual Activity    Alcohol use: Never    Drug use: Never    Sexual activity: Not on file       Current Facility-Administered Medications:     acetaminophen (TYLENOL) tablet 975 mg, Q6H PRN    aluminum-magnesium hydroxide-simethicone (MAALOX) oral suspension 30 mL, Q6H PRN    enoxaparin (LOVENOX) subcutaneous injection 40 mg, Daily    HYDROmorphone (DILAUDID) injection 0.5 mg, Q4H PRN    iohexol (OMNIPAQUE) 350 MG/ML injection (MULTI-DOSE) 100 mL, Once in imaging    ketorolac (TORADOL) injection 30 mg, Q6H PRN    ondansetron (ZOFRAN) injection 4 mg, Q6H PRN    oxyCODONE (ROXICODONE) IR tablet 5 mg, Q6H PRN    polyethylene glycol (MIRALAX) packet 17 g, Daily PRN    simethicone (MYLICON) chewable tablet 80 mg, 4x Daily PRN    sodium chloride 0.9 % infusion, Continuous, Last Rate: 200 mL/hr (04/04/25 3092)    sodium chloride 0.9 % infusion, Continuous  Prior to Admission Medications   Prescriptions Last Dose Informant Patient Reported? Taking?   Benzocaine-Resorcinol (Vagisil) 5-2 % CREA   No No   Sig: Apply 1 application topically daily as needed (vaginal itching)   Blood Pressure KIT   No No   Sig: Use 2 (two) times a day   benzonatate (TESSALON PERLES) 100 mg capsule   No No   Sig: Take 1 capsule (100 mg total) by mouth 3 (three) times a day as needed for cough   lisinopril (ZESTRIL) 5 mg tablet   No Yes   Sig: Take 1 tablet (5 mg total) by mouth daily   polyethylene glycol (GOLYTELY) 4000 mL solution   No No   Sig: Take 4,000 mL by mouth once for 1 dose Colonoscopy  prep      Facility-Administered Medications: None     Patient has no known allergies.    Objective :  Temp:  [97.8 °F (36.6 °C)-98.4 °F (36.9 °C)] 98.4 °F (36.9 °C)  HR:  [81-90] 83  BP: (128-160)/(63-93) 160/93  Resp:  [17-20] 18  SpO2:  [94 %-98 %] 94 %  O2 Device: None (Room air)      Physical Exam  Vitals and nursing note reviewed.   Constitutional:       General: She is not in acute distress.  HENT:      Head: Normocephalic and atraumatic.      Right Ear: External ear normal.      Left Ear: External ear normal.   Eyes:      Conjunctiva/sclera: Conjunctivae normal.   Cardiovascular:      Rate and Rhythm: Normal rate.   Pulmonary:      Effort: Pulmonary effort is normal. No respiratory distress.   Abdominal:      Comments: Soft, mild periumbilical tenderness, nondistended   Neurological:      Mental Status: She is alert.           Lab Results: I have reviewed the following results:  Recent Labs     04/04/25  0114   WBC 16.89*   HGB 14.8   HCT 44.7      SODIUM 139   K 3.8      CO2 25   BUN 14   CREATININE 0.84   GLUC 167*   MG 2.0   *   *   ALB 4.4   TBILI 0.83   ALKPHOS 204*   HSTNI0 <2       Imaging Results Review: No pertinent imaging studies reviewed.  Other Study Results Review: No additional pertinent studies reviewed.    VTE Pharmacologic Prophylaxis: VTE covered by:  enoxaparin, Subcutaneous     VTE Mechanical Prophylaxis: sequential compression device

## 2025-04-04 NOTE — H&P
H&P - Hospitalist   Name: Darcie Reyes 54 y.o. female I MRN: [id number]  Unit/Bed#: ED-11 I Date of Admission: 4/4/2025   Date of Service: 4/4/2025 I Hospital Day: 0     Assessment & Plan  Idiopathic acute pancreatitis without infection or necrosis  CT scan:   Acute uncomplicated pancreatitis  Cholelithiasis without cholecystitis  Lipase: >12,000  NPO, IV hydration  Pain control  PRN Antiemetics  Trend serum lipase  FLP to assess triglycerides  Consult surgery and GI  Cholelithiasis  Cholelithiasis with transaminitis, likely obstructive.  See above plan: Pancreatitis  Transaminitis  Transaminitis in setting of pancreatitis with cholelithiasis  Trend CMP  Class 3 severe obesity due to excess calories without serious comorbidity with body mass index (BMI) of 40.0 to 44.9 in adult (HCC)  Weight loss, dietary and lifestyle modifications  Essential hypertension  Hold lisinopril  Moderate mixed hyperlipidemia not requiring statin therapy  Remains off statin.  Update lipid panel  NELLI (obstructive sleep apnea)  PMD referred to sleep medicine      VTE Pharmacologic Prophylaxis: VTE Score: 3 Moderate Risk (Score 3-4) - Pharmacological DVT Prophylaxis Ordered: enoxaparin (Lovenox).  Code Status: Prior FC  Discussion with family: Updated  (daughter) at bedside.    Anticipated Length of Stay: Patient will be admitted on an inpatient basis with an anticipated length of stay of greater than 2 midnights secondary to pancreatitis with cholelithiasis and transaminitis.    History of Present Illness   Chief Complaint: Torri Reyes is a 54 y.o. female with a PMH of above who presents with c/o severe abdominal pain.  Reports pain started tonight in back, took Advil with no improvement.  Pain became so severe she started to vomit and could not walk, was screaming and crying due to intensity of pain.    Review of Systems   Constitutional: Negative.    HENT: Negative.     Respiratory: Negative.     Cardiovascular:  Negative.    Gastrointestinal:  Positive for abdominal pain, nausea and vomiting.   Genitourinary: Negative.    Musculoskeletal:  Positive for back pain.   Neurological: Negative.        Historical Information   Past Medical History:   Diagnosis Date    Myositis 11/20/2019     History reviewed. No pertinent surgical history.  Social History     Tobacco Use    Smoking status: Never     Passive exposure: Never    Smokeless tobacco: Never   Vaping Use    Vaping status: Never Used   Substance and Sexual Activity    Alcohol use: Never    Drug use: Never    Sexual activity: Not on file     E-Cigarette/Vaping    E-Cigarette Use Never User      E-Cigarette/Vaping Substances    Nicotine No     THC No     CBD No     Flavoring No     Other No     Unknown No      Family History   Problem Relation Age of Onset    No Known Problems Mother     No Known Problems Father     No Known Problems Sister     No Known Problems Sister     No Known Problems Sister     No Known Problems Daughter     No Known Problems Maternal Grandmother     No Known Problems Maternal Grandfather     No Known Problems Paternal Grandmother     No Known Problems Paternal Grandfather     No Known Problems Maternal Aunt     No Known Problems Paternal Aunt     No Known Problems Paternal Aunt      Social History:  Marital Status: /Civil Union   Occupation: Unemployed  Patient Pre-hospital Living Situation: Resides at home with spouse and daughter  Patient Pre-hospital Level of Mobility: walks  Patient Pre-hospital Diet Restrictions:     Meds/Allergies   I have reviewed home medications with patient personally.  Prior to Admission medications    Medication Sig Start Date End Date Taking? Authorizing Provider   Benzocaine-Resorcinol (Vagisil) 5-2 % CREA Apply 1 application topically daily as needed (vaginal itching) 4/29/22   Abigail Garcia MD   benzonatate (TESSALON PERLES) 100 mg capsule Take 1 capsule (100 mg total) by mouth 3 (three) times a day as needed  for cough 5/19/23   Jonas Recio MD   Blood Pressure KIT Use 2 (two) times a day 2/24/25   Esteban Rockwell MD   lisinopril (ZESTRIL) 5 mg tablet Take 1 tablet (5 mg total) by mouth daily 10/25/24   Esteban Rockwell MD   polyethylene glycol (GOLYTELY) 4000 mL solution Take 4,000 mL by mouth once for 1 dose Colonoscopy prep 11/27/22 11/27/22  Prince Zhang MD     No Known Allergies    Objective :  Temp:  [97.8 °F (36.6 °C)] 97.8 °F (36.6 °C)  HR:  [81-90] 81  BP: (128-137)/(63-74) 137/63  Resp:  [17-20] 20  SpO2:  [97 %-98 %] 98 %  O2 Device: None (Room air)    Physical Exam  Constitutional:       General: She is not in acute distress.     Appearance: Normal appearance. She is obese. She is not ill-appearing, toxic-appearing or diaphoretic.      Comments: Morbid obesity   HENT:      Head: Normocephalic and atraumatic.   Eyes:      General: No scleral icterus.  Cardiovascular:      Rate and Rhythm: Normal rate and regular rhythm.      Heart sounds: Normal heart sounds.   Pulmonary:      Effort: Pulmonary effort is normal.      Breath sounds: Normal breath sounds.   Abdominal:      Palpations: Abdomen is soft.      Tenderness: There is abdominal tenderness.      Comments: Decreased bowel sounds   Musculoskeletal:         General: No tenderness.      Right lower leg: No edema.      Left lower leg: No edema.   Skin:     General: Skin is warm.      Coloration: Skin is not jaundiced.   Neurological:      Mental Status: She is alert and oriented to person, place, and time.   Psychiatric:         Mood and Affect: Mood normal.         Behavior: Behavior normal.         Thought Content: Thought content normal.         Judgment: Judgment normal.      Comments: Somnolent        Lines/Drains:            Lab Results: I have reviewed the following results:  Results from last 7 days   Lab Units 04/04/25  0114   WBC Thousand/uL 16.89*   HEMOGLOBIN g/dL 14.8   HEMATOCRIT % 44.7   PLATELETS Thousands/uL 223   LYMPHO PCT % 27    MONO PCT % 4   EOS PCT % 1     Results from last 7 days   Lab Units 04/04/25  0114   SODIUM mmol/L 139   POTASSIUM mmol/L 3.8   CHLORIDE mmol/L 104   CO2 mmol/L 25   BUN mg/dL 14   CREATININE mg/dL 0.84   ANION GAP mmol/L 10   CALCIUM mg/dL 9.7   ALBUMIN g/dL 4.4   TOTAL BILIRUBIN mg/dL 0.83   ALK PHOS U/L 204*   ALT U/L 185*   AST U/L 229*   GLUCOSE RANDOM mg/dL 167*             Lab Results   Component Value Date    HGBA1C 5.4 07/07/2021           Imaging Results Review: I reviewed radiology reports from this admission including: CT abdomen/pelvis.  Other Study Results Review: EKG was reviewed.     Administrative Statements       ** Please Note: This note has been constructed using a voice recognition system. **

## 2025-04-04 NOTE — ASSESSMENT & PLAN NOTE
Patient with elevation in serum transaminases.  AST//185, , and total bilirubin 0.83.  No evidence of cholecystitis or choledocholithiasis.  Continue to monitor and trend LFTs to ensure improvement.

## 2025-04-04 NOTE — ASSESSMENT & PLAN NOTE
CT imaging demonstrating cholelithiasis with no evidence of acute cholecystitis.  Likely cause of presenting pancreatitis.  Plan for cholecystectomy this admission.  Please see additional management under gallstone pancreatitis as above.

## 2025-04-04 NOTE — Clinical Note
Case was discussed with  and the patient's admission status was agreed to be  to the service of Dr. Gurrola

## 2025-04-04 NOTE — ASSESSMENT & PLAN NOTE
53 yo female with abdominal pain. Hx of cholelithiasis, NELLI, obesity, HTN & myositis. Sudden onset of back pain overnight, no relief with Advil. Reports nausea, vomiting and difficulty ambulating due to pain. VS stable on RA. On exam, mild periumbilical tenderness, non-distended. Wbc 16.89. Hb 14.8, Cr 0.84, T bili 0.83, AST//185, , Lipase >12,000, . CTAP shows acute uncomplicated pancreatitis, cholelithiasis without findings of acute cholecystitis.     Concerns for gallstone pancreatitis.     Plan  -Plan for Lap cholecystectomy during this admission.  -F/u GI consultation  -IV fluid hydration  -Pain/nausea control PRN  -Rest of care per primary team.

## 2025-04-04 NOTE — ASSESSMENT & PLAN NOTE
Patient with CT imaging demonstrating hepatic steatosis.  Patient with multiple metabolic risk factors including hypertension, hyperlipidemia, and obesity placing patient at higher risk.  Patient with LFT elevations currently in setting of acute gallstone pancreatitis, the patient also with minor elevations in serum transaminases and alkaline phosphatase in the past.  Platelets normal.  Therefore, recommend outpatient GI follow-up for consideration of ultrasound elastography to further risk stratify and evaluate for advanced fibrosis.  Also recommend follow-up with PCP for management of metabolic risk factors.

## 2025-04-04 NOTE — PLAN OF CARE
Problem: PAIN - ADULT  Goal: Verbalizes/displays adequate comfort level or baseline comfort level  Description: Interventions:- Encourage patient to monitor pain and request assistance- Assess pain using appropriate pain scale- Administer analgesics based on type and severity of pain and evaluate response- Implement non-pharmacological measures as appropriate and evaluate response- Consider cultural and social influences on pain and pain management- Notify physician/advanced practitioner if interventions unsuccessful or patient reports new pain  Outcome: Progressing     Problem: DISCHARGE PLANNING  Goal: Discharge to home or other facility with appropriate resources  Description: INTERVENTIONS:- Identify barriers to discharge w/patient and caregiver- Arrange for needed discharge resources and transportation as appropriate- Identify discharge learning needs (meds, wound care, etc.)- Arrange for interpretive services to assist at discharge as needed- Refer to Case Management Department for coordinating discharge planning if the patient needs post-hospital services based on physician/advanced practitioner order or complex needs related to functional status, cognitive ability, or social support system  Outcome: Progressing     Problem: Knowledge Deficit  Goal: Patient/family/caregiver demonstrates understanding of disease process, treatment plan, medications, and discharge instructions  Description: Complete learning assessment and assess knowledge base.Interventions:- Provide teaching at level of understanding- Provide teaching via preferred learning methods  Outcome: Progressing     Problem: GASTROINTESTINAL - ADULT  Goal: Minimal or absence of nausea and/or vomiting  Description: INTERVENTIONS:- Administer IV fluids if ordered to ensure adequate hydration- Maintain NPO status until nausea and vomiting are resolved- Nasogastric tube if ordered- Administer ordered antiemetic medications as needed- Provide  nonpharmacologic comfort measures as appropriate- Advance diet as tolerated, if ordered- Consider nutrition services referral to assist patient with adequate nutrition and appropriate food choices  Outcome: Progressing

## 2025-04-04 NOTE — ASSESSMENT & PLAN NOTE
54-year-old female with past medical history of NELLI, hypertension, obesity, and myositis who presented to ED with back/abdominal pain with nausea/vomiting.  Found to have an elevated lipase level of greater than 12,000 and along with LFT abnormalities.  CT imaging demonstrating acute interstitial pancreatitis along with cholelithiasis but no evidence of cholecystitis or choledocholithiasis.  Patient without prior alcohol, tobacco, or marijuana use.  Medications reviewed.  Triglycerides normal and IgG pending.  Etiology of pancreatitis likely gallstone induced unfortunately patient without evidence of complication.  Will therefore require symptomatic management but recommend cholecystectomy prior to discharge.    Plan:   Aggressive IVF hydration; continue LR @ 150 cc/hr   Monitor urine output along with BUN/sCr; maintain UO of >0.5 ml/kg/hour  Current diet NPO; will advance to clear liquid diet  Follow-up results of IgG levels  Consider enteral feeding with NG/NJ tube placement in 72 hours if patient unable to tolerate PO  If worsening abdominal pain or persistent organ failure, consider repeat CT imaging in 72 hours  Monitor off antibiotics; monitor WBC and trend fever curve  No indication to trend Lipase levels   General Surgery consulted, plan for cholecystectomy this admission  PRN pain and anti-emetics; additional symptom management per primary team

## 2025-04-04 NOTE — ED PROVIDER NOTES
Time reflects when diagnosis was documented in both MDM as applicable and the Disposition within this note       Time User Action Codes Description Comment    4/4/2025  3:20 AM Susan Gillis [K85.90] Pancreatitis, acute     4/4/2025  3:21 AM Susan Gillis [K80.20] Cholelithiasis           ED Disposition       ED Disposition   Admit    Condition   Stable    Date/Time   Fri Apr 4, 2025  3:21 AM    Comment   Case was discussed with MONA and the patient's admission status was agreed to be inpatient to the service of Dr. Vasquez  .               Assessment & Plan       Medical Decision Making  DDx including but not limited to: appendicitis, gastroenteritis, gastritis, PUD, GERD, gastroparesis, hepatitis, pancreatitis, colitis, enteritis, food poisoning, mesenteric adenitis, IBD, IBS, ileus, bowel obstruction, intussusception, volvulus, cholecystitis, biliary colic, choledocholithiasis, perforated viscus, splenic etiology, constipation, pelvic pathology    Will obtain EKG, troponin to evaluate for ACS.  Will obtain CT abdomen and pelvis. Will obtain CBC to evaluate for leukocytosis, anemia.  Will obtain CMP to evaluate kidney function, for electrolyte disturbance.  Will obtain lipase to evaluate for pancreatitis.      Lipase > 12,000. LFTs elevated. T bili WINL. WBC 16.8. CT consistent with acute pancreatitis, cholelithiasis. Patient reports pain is 5/10 following morphine. Will admit    At the time of admission, the patient is in no acute distress. I discussed with the patient and family the diagnosis, treatment plan, and plan for admission; they were given the opportunity to ask questions and verbalized understanding. They agree with plan.      Problems Addressed:  Cholelithiasis: acute illness or injury  Pancreatitis, acute: acute illness or injury    Amount and/or Complexity of Data Reviewed  Labs: ordered. Decision-making details documented in ED Course.  Radiology: ordered.    Risk  Prescription drug  management.  Decision regarding hospitalization.    EKG: NSR at 90 BPM, LAD, , QTc 440,  Q waves in II and III, no STEMI, no previous EKG for comparison as interpreted by me     ED Course as of 04/04/25 0433   Fri Apr 04, 2025   0143 ALK PHOS(!): 204   0143 ALT(!): 185   0143 AST(!): 229   0143 WBC(!): 16.89   0150 LIPASE(!): >12,000       Medications   iohexol (OMNIPAQUE) 350 MG/ML injection (MULTI-DOSE) 100 mL (has no administration in time range)   ketorolac (TORADOL) injection 30 mg (30 mg Intravenous Given 4/4/25 0340)   acetaminophen (TYLENOL) tablet 975 mg (has no administration in time range)   sodium chloride 0.9 % infusion (has no administration in time range)   sodium chloride 0.9 % infusion (has no administration in time range)   ondansetron (ZOFRAN) injection 4 mg (has no administration in time range)   polyethylene glycol (MIRALAX) packet 17 g (has no administration in time range)   aluminum-magnesium hydroxide-simethicone (MAALOX) oral suspension 30 mL (has no administration in time range)   simethicone (MYLICON) chewable tablet 80 mg (has no administration in time range)   enoxaparin (LOVENOX) subcutaneous injection 40 mg (has no administration in time range)   HYDROmorphone (DILAUDID) injection 0.5 mg (has no administration in time range)   oxyCODONE (ROXICODONE) IR tablet 5 mg (has no administration in time range)   sodium chloride 0.9 % bolus 1,000 mL (0 mL Intravenous Stopped 4/4/25 0210)   morphine injection 4 mg (4 mg Intravenous Given 4/4/25 0110)   Famotidine (PF) (PEPCID) injection 20 mg (20 mg Intravenous Given 4/4/25 0111)   iohexol (OMNIPAQUE) 350 MG/ML injection (MULTI-DOSE) 100 mL (100 mL Intravenous Given 4/4/25 0151)       ED Risk Strat Scores                            SBIRT 20yo+      Flowsheet Row Most Recent Value   Initial Alcohol Screen: US AUDIT-C     1. How often do you have a drink containing alcohol? 0 Filed at: 04/04/2025 0031   2. How many drinks containing alcohol  do you have on a typical day you are drinking?  0 Filed at: 04/04/2025 0031   3a. Male UNDER 65: How often do you have five or more drinks on one occasion? 0 Filed at: 04/04/2025 0031   3b. FEMALE Any Age, or MALE 65+: How often do you have 4 or more drinks on one occassion? 0 Filed at: 04/04/2025 0031   Audit-C Score 0 Filed at: 04/04/2025 0031   LORENA: How many times in the past year have you...    Used an illegal drug or used a prescription medication for non-medical reasons? Never Filed at: 04/04/2025 0031                            History of Present Illness       Chief Complaint   Patient presents with    Abdominal Pain     Sudden onset abd pain. Vomited 1x       Past Medical History:   Diagnosis Date    Myositis 11/20/2019      History reviewed. No pertinent surgical history.   Family History   Problem Relation Age of Onset    No Known Problems Mother     No Known Problems Father     No Known Problems Sister     No Known Problems Sister     No Known Problems Sister     No Known Problems Daughter     No Known Problems Maternal Grandmother     No Known Problems Maternal Grandfather     No Known Problems Paternal Grandmother     No Known Problems Paternal Grandfather     No Known Problems Maternal Aunt     No Known Problems Paternal Aunt     No Known Problems Paternal Aunt       Social History     Tobacco Use    Smoking status: Never     Passive exposure: Never    Smokeless tobacco: Never   Vaping Use    Vaping status: Never Used   Substance Use Topics    Alcohol use: Never    Drug use: Never      E-Cigarette/Vaping    E-Cigarette Use Never User       E-Cigarette/Vaping Substances    Nicotine No     THC No     CBD No     Flavoring No     Other No     Unknown No       I have reviewed and agree with the history as documented.     The patient is a 54-year-old male with history of hyperlipidemia, hypertension who presents to the ED for evaluation of abdominal pain that began suddenly prior to arrival.  She reports  diffuse abdominal pain, worse in the upper abdomen.  She did have 1 episode of vomiting with pain.  She took ibuprofen without improvement.  She has never had similar pain.  Pain does not radiate to her chest or back.  She did have nonbloody diarrhea yesterday.  She otherwise denies fever, chills, ongoing nausea, melena, hematochezia, dysuria, hematuria, vaginal bleeding, vaginal discharge, chest pain, dyspnea. Surgical history includes  section. Family at bedside translates for patient.        Review of Systems   Constitutional:  Negative for chills and fever.   Respiratory:  Negative for cough and shortness of breath.    Cardiovascular:  Negative for chest pain and leg swelling.   Gastrointestinal:  Positive for abdominal pain, diarrhea, nausea and vomiting. Negative for blood in stool and constipation.   Genitourinary:  Negative for dysuria and flank pain.   Musculoskeletal:  Negative for arthralgias and myalgias.   Skin:  Negative for rash and wound.   Neurological:  Negative for weakness, numbness and headaches.           Objective       ED Triage Vitals   Temperature Pulse Blood Pressure Respirations SpO2 Patient Position - Orthostatic VS   25   97.8 °F (36.6 °C) 90 134/74 20 97 % Sitting      Temp Source Heart Rate Source BP Location FiO2 (%) Pain Score    25 -- 25    Oral Monitor Right arm  10 - Worst Possible Pain      Vitals      Date and Time Temp Pulse SpO2 Resp BP Pain Score FACES Pain Rating User   25 0340 -- -- -- -- -- 7 -- [facility]   25 0230 -- 81 98 % 20 137/63 -- -- [facility]   25 0200 -- 89 97 % 17 128/72 -- -- [facility]   25 0110 -- -- -- -- -- 10 - Worst Possible Pain -- [facility]   25 97.8 °F (36.6 °C) -- -- -- -- -- -- [facility]   25 0028 -- -- -- -- 134/74 -- --    04/04/25 0027 -- 90 97 % 20 -- -- -- JR            Physical Exam  Vitals and  nursing note reviewed.   Constitutional:       General: She is not in acute distress.     Appearance: She is well-developed. She is not toxic-appearing.      Comments: Uncomfortable appearing    HENT:      Head: Normocephalic and atraumatic.   Eyes:      Conjunctiva/sclera: Conjunctivae normal.   Cardiovascular:      Rate and Rhythm: Normal rate and regular rhythm.      Heart sounds: No murmur heard.  Pulmonary:      Effort: Pulmonary effort is normal. No respiratory distress.      Breath sounds: Normal breath sounds.   Abdominal:      Palpations: Abdomen is soft.      Tenderness: There is abdominal tenderness in the right upper quadrant, epigastric area, periumbilical area and left upper quadrant. There is guarding. There is no right CVA tenderness, left CVA tenderness or rebound.   Musculoskeletal:         General: No swelling.      Cervical back: Neck supple.   Skin:     General: Skin is warm and dry.      Capillary Refill: Capillary refill takes less than 2 seconds.   Neurological:      Mental Status: She is alert.   Psychiatric:         Mood and Affect: Mood normal.         Results Reviewed       Procedure Component Value Units Date/Time    Lipid Panel with Direct LDL reflex [[id number]] Collected: 04/04/25 0114    Lab Status: In process Specimen: Blood from Hand, Left Updated: 04/04/25 0428    Magnesium [[id number]] Collected: 04/04/25 0114    Lab Status: In process Specimen: Blood from Hand, Left Updated: 04/04/25 0428    Manual Differential(PHLEBS Do Not Order) [[id number]]  (Abnormal) Collected: 04/04/25 0114    Lab Status: Final result Specimen: Blood from Hand, Left Updated: 04/04/25 0207     Segmented % 63 %      Lymphocytes % 27 %      Monocytes % 4 %      Eosinophils % 1 %      Basophils % 1 %      Atypical Lymphocytes % 4 %      Absolute Neutrophils 10.64 Thousand/uL      Absolute Lymphocytes 5.24 Thousand/uL      Absolute Monocytes 0.68 Thousand/uL      Absolute Eosinophils 0.17 Thousand/uL       Absolute Basophils 0.17 Thousand/uL      Total Counted --     RBC Morphology Present     Platelet Estimate Adequate     Large Platelet Present    Lipase [[id number]]  (Abnormal) Collected: 04/04/25 0114    Lab Status: Final result Specimen: Blood from Hand, Left Updated: 04/04/25 0149     Lipase >12,000 u/L     HS Troponin 0hr (reflex protocol) [[id number]]  (Normal) Collected: 04/04/25 0114    Lab Status: Final result Specimen: Blood from Hand, Left Updated: 04/04/25 0144     hs TnI 0hr <2 ng/L     Comprehensive metabolic panel [[id number]]  (Abnormal) Collected: 04/04/25 0114    Lab Status: Final result Specimen: Blood from Hand, Left Updated: 04/04/25 0138     Sodium 139 mmol/L      Potassium 3.8 mmol/L      Chloride 104 mmol/L      CO2 25 mmol/L      ANION GAP 10 mmol/L      BUN 14 mg/dL      Creatinine 0.84 mg/dL      Glucose 167 mg/dL      Calcium 9.7 mg/dL       U/L       U/L      Alkaline Phosphatase 204 U/L      Total Protein 8.0 g/dL      Albumin 4.4 g/dL      Total Bilirubin 0.83 mg/dL      eGFR 78 ml/min/1.73sq m     Narrative:      [employer] guidelines for Chronic Kidney Disease (CKD):     Stage 1 with normal or high GFR (GFR > 90 mL/min/1.73 square meters)    Stage 2 Mild CKD (GFR = 60-89 mL/min/1.73 square meters)    Stage 3A Moderate CKD (GFR = 45-59 mL/min/1.73 square meters)    Stage 3B Moderate CKD (GFR = 30-44 mL/min/1.73 square meters)    Stage 4 Severe CKD (GFR = 15-29 mL/min/1.73 square meters)    Stage 5 End Stage CKD (GFR <15 mL/min/1.73 square meters)  Note: GFR calculation is accurate only with a steady state creatinine    CBC and differential [[id number]]  (Abnormal) Collected: 04/04/25 0114    Lab Status: Final result Specimen: Blood from Hand, Left Updated: 04/04/25 0124     WBC 16.89 Thousand/uL      RBC 4.83 Million/uL      Hemoglobin 14.8 g/dL      Hematocrit 44.7 %      MCV 93 fL      MCH 30.6 pg      MCHC 33.1 g/dL      RDW 13.2 %      MPV 12.7 fL       Platelets 223 Thousands/uL     Narrative:      This is an appended report.  These results have been appended to a previously verified report.            CT abdomen pelvis with contrast   Final Interpretation by Marisol Zamarripa MD (04/04 0208)      Acute uncomplicated pancreatitis.      Cholelithiasis         Workstation performed: [id number]             Procedures    ED Medication and Procedure Management   Prior to Admission Medications   Prescriptions Last Dose Informant Patient Reported? Taking?   Benzocaine-Resorcinol (Vagisil) 5-2 % CREA   No No   Sig: Apply 1 application topically daily as needed (vaginal itching)   Blood Pressure KIT   No No   Sig: Use 2 (two) times a day   benzonatate (TESSALON PERLES) 100 mg capsule   No No   Sig: Take 1 capsule (100 mg total) by mouth 3 (three) times a day as needed for cough   lisinopril (ZESTRIL) 5 mg tablet   No No   Sig: Take 1 tablet (5 mg total) by mouth daily   polyethylene glycol (GOLYTELY) 4000 mL solution   No No   Sig: Take 4,000 mL by mouth once for 1 dose Colonoscopy prep      Facility-Administered Medications: None     Current Discharge Medication List        CONTINUE these medications which have NOT CHANGED    Details   Benzocaine-Resorcinol (Vagisil) 5-2 % CREA Apply 1 application topically daily as needed (vaginal itching)  Qty: 60 g, Refills: 1    Associated Diagnoses: Vaginal itching      benzonatate (TESSALON PERLES) 100 mg capsule Take 1 capsule (100 mg total) by mouth 3 (three) times a day as needed for cough  Qty: 20 capsule, Refills: 0    Associated Diagnoses: Acute cough      Blood Pressure KIT Use 2 (two) times a day  Qty: 1 kit, Refills: 0    Associated Diagnoses: Essential hypertension      lisinopril (ZESTRIL) 5 mg tablet Take 1 tablet (5 mg total) by mouth daily  Qty: 90 tablet, Refills: 2    Associated Diagnoses: Essential hypertension      polyethylene glycol (GOLYTELY) 4000 mL solution Take 4,000 mL by mouth once for 1 dose Colonoscopy  prep  Qty: 4000 mL, Refills: 0    Associated Diagnoses: Screening for colon cancer           No discharge procedures on file.  ED SEPSIS DOCUMENTATION   Time reflects when diagnosis was documented in both MDM as applicable and the Disposition within this note       Time User Action Codes Description Comment    4/4/2025  3:20 AM Susan Gillis [K85.90] Pancreatitis, acute     4/4/2025  3:21 AM Susan Gillis [K80.20] Cholelithiasis                  Susan Gillis PA-C  04/04/25 0448

## 2025-04-05 ENCOUNTER — APPOINTMENT (INPATIENT)
Dept: MRI IMAGING | Facility: HOSPITAL | Age: 55
DRG: 444 | End: 2025-04-05
Payer: COMMERCIAL

## 2025-04-05 ENCOUNTER — APPOINTMENT (INPATIENT)
Dept: RADIOLOGY | Facility: HOSPITAL | Age: 55
DRG: 444 | End: 2025-04-05
Payer: COMMERCIAL

## 2025-04-05 PROBLEM — R65.10 SIRS (SYSTEMIC INFLAMMATORY RESPONSE SYNDROME) (HCC): Status: ACTIVE | Noted: 2025-04-05

## 2025-04-05 LAB
ALBUMIN SERPL BCG-MCNC: 3.6 G/DL (ref 3.5–5)
ALP SERPL-CCNC: 125 U/L (ref 34–104)
ALT SERPL W P-5'-P-CCNC: 139 U/L (ref 7–52)
ANION GAP SERPL CALCULATED.3IONS-SCNC: 6 MMOL/L (ref 4–13)
AST SERPL W P-5'-P-CCNC: 73 U/L (ref 13–39)
BASOPHILS # BLD MANUAL: 0 THOUSAND/UL (ref 0–0.1)
BASOPHILS NFR MAR MANUAL: 0 % (ref 0–1)
BILIRUB SERPL-MCNC: 0.88 MG/DL (ref 0.2–1)
BNP SERPL-MCNC: 39 PG/ML (ref 0–100)
BUN SERPL-MCNC: 18 MG/DL (ref 5–25)
CALCIUM SERPL-MCNC: 8 MG/DL (ref 8.4–10.2)
CHLORIDE SERPL-SCNC: 108 MMOL/L (ref 96–108)
CO2 SERPL-SCNC: 24 MMOL/L (ref 21–32)
CREAT SERPL-MCNC: 0.75 MG/DL (ref 0.6–1.3)
EOSINOPHIL # BLD MANUAL: 0 THOUSAND/UL (ref 0–0.4)
EOSINOPHIL NFR BLD MANUAL: 0 % (ref 0–6)
ERYTHROCYTE [DISTWIDTH] IN BLOOD BY AUTOMATED COUNT: 14.2 % (ref 11.6–15.1)
GFR SERPL CREATININE-BSD FRML MDRD: 90 ML/MIN/1.73SQ M
GLUCOSE SERPL-MCNC: 154 MG/DL (ref 65–140)
HCT VFR BLD AUTO: 46.9 % (ref 34.8–46.1)
HGB BLD-MCNC: 15.1 G/DL (ref 11.5–15.4)
LIPASE SERPL-CCNC: 820 U/L (ref 11–82)
LYMPHOCYTES # BLD AUTO: 1.98 THOUSAND/UL (ref 0.6–4.47)
LYMPHOCYTES # BLD AUTO: 7 % (ref 14–44)
MCH RBC QN AUTO: 30.7 PG (ref 26.8–34.3)
MCHC RBC AUTO-ENTMCNC: 32.2 G/DL (ref 31.4–37.4)
MCV RBC AUTO: 95 FL (ref 82–98)
MONOCYTES # BLD AUTO: 0.57 THOUSAND/UL (ref 0–1.22)
MONOCYTES NFR BLD: 2 % (ref 4–12)
NEUTROPHILS # BLD MANUAL: 25.79 THOUSAND/UL (ref 1.85–7.62)
NEUTS BAND NFR BLD MANUAL: 6 % (ref 0–8)
NEUTS SEG NFR BLD AUTO: 85 % (ref 43–75)
PLATELET # BLD AUTO: 184 THOUSANDS/UL (ref 149–390)
PLATELET BLD QL SMEAR: ADEQUATE
PMV BLD AUTO: 12.9 FL (ref 8.9–12.7)
POTASSIUM SERPL-SCNC: 3.8 MMOL/L (ref 3.5–5.3)
PROT SERPL-MCNC: 6.4 G/DL (ref 6.4–8.4)
RBC # BLD AUTO: 4.92 MILLION/UL (ref 3.81–5.12)
RBC MORPH BLD: NORMAL
SODIUM SERPL-SCNC: 138 MMOL/L (ref 135–147)
WBC # BLD AUTO: 28.34 THOUSAND/UL (ref 4.31–10.16)

## 2025-04-05 PROCEDURE — 74183 MRI ABD W/O CNTR FLWD CNTR: CPT

## 2025-04-05 PROCEDURE — A9585 GADOBUTROL INJECTION: HCPCS | Performed by: INTERNAL MEDICINE

## 2025-04-05 PROCEDURE — 83690 ASSAY OF LIPASE: CPT | Performed by: NURSE PRACTITIONER

## 2025-04-05 PROCEDURE — 99232 SBSQ HOSP IP/OBS MODERATE 35: CPT | Performed by: STUDENT IN AN ORGANIZED HEALTH CARE EDUCATION/TRAINING PROGRAM

## 2025-04-05 PROCEDURE — 94760 N-INVAS EAR/PLS OXIMETRY 1: CPT

## 2025-04-05 PROCEDURE — 99232 SBSQ HOSP IP/OBS MODERATE 35: CPT | Performed by: INTERNAL MEDICINE

## 2025-04-05 PROCEDURE — 85007 BL SMEAR W/DIFF WBC COUNT: CPT | Performed by: NURSE PRACTITIONER

## 2025-04-05 PROCEDURE — 80053 COMPREHEN METABOLIC PANEL: CPT | Performed by: NURSE PRACTITIONER

## 2025-04-05 PROCEDURE — 94762 N-INVAS EAR/PLS OXIMTRY CONT: CPT

## 2025-04-05 PROCEDURE — 85027 COMPLETE CBC AUTOMATED: CPT | Performed by: NURSE PRACTITIONER

## 2025-04-05 PROCEDURE — 83880 ASSAY OF NATRIURETIC PEPTIDE: CPT | Performed by: INTERNAL MEDICINE

## 2025-04-05 PROCEDURE — 99232 SBSQ HOSP IP/OBS MODERATE 35: CPT | Performed by: SURGERY

## 2025-04-05 PROCEDURE — 94640 AIRWAY INHALATION TREATMENT: CPT

## 2025-04-05 PROCEDURE — 71045 X-RAY EXAM CHEST 1 VIEW: CPT

## 2025-04-05 RX ORDER — LEVALBUTEROL INHALATION SOLUTION 1.25 MG/3ML
1.25 SOLUTION RESPIRATORY (INHALATION) ONCE
Status: COMPLETED | OUTPATIENT
Start: 2025-04-05 | End: 2025-04-05

## 2025-04-05 RX ORDER — FUROSEMIDE 10 MG/ML
40 INJECTION INTRAMUSCULAR; INTRAVENOUS ONCE
Status: COMPLETED | OUTPATIENT
Start: 2025-04-05 | End: 2025-04-05

## 2025-04-05 RX ORDER — GADOBUTROL 604.72 MG/ML
10 INJECTION INTRAVENOUS
Status: COMPLETED | OUTPATIENT
Start: 2025-04-05 | End: 2025-04-05

## 2025-04-05 RX ADMIN — GADOBUTROL 10 ML: 604.72 INJECTION INTRAVENOUS at 11:33

## 2025-04-05 RX ADMIN — OXYCODONE 5 MG: 5 TABLET ORAL at 08:06

## 2025-04-05 RX ADMIN — SODIUM CHLORIDE, SODIUM LACTATE, POTASSIUM CHLORIDE, AND CALCIUM CHLORIDE 150 ML/HR: .6; .31; .03; .02 INJECTION, SOLUTION INTRAVENOUS at 03:27

## 2025-04-05 RX ADMIN — HYDROMORPHONE HYDROCHLORIDE 1 MG: 1 INJECTION, SOLUTION INTRAMUSCULAR; INTRAVENOUS; SUBCUTANEOUS at 06:42

## 2025-04-05 RX ADMIN — SODIUM CHLORIDE, SODIUM LACTATE, POTASSIUM CHLORIDE, AND CALCIUM CHLORIDE 1000 ML: .6; .31; .03; .02 INJECTION, SOLUTION INTRAVENOUS at 07:57

## 2025-04-05 RX ADMIN — HYDROMORPHONE HYDROCHLORIDE 1 MG: 1 INJECTION, SOLUTION INTRAMUSCULAR; INTRAVENOUS; SUBCUTANEOUS at 12:25

## 2025-04-05 RX ADMIN — ONDANSETRON 4 MG: 2 INJECTION INTRAMUSCULAR; INTRAVENOUS at 01:49

## 2025-04-05 RX ADMIN — LISINOPRIL 5 MG: 5 TABLET ORAL at 08:09

## 2025-04-05 RX ADMIN — SODIUM CHLORIDE, SODIUM LACTATE, POTASSIUM CHLORIDE, AND CALCIUM CHLORIDE 150 ML/HR: .6; .31; .03; .02 INJECTION, SOLUTION INTRAVENOUS at 16:05

## 2025-04-05 RX ADMIN — LEVALBUTEROL HYDROCHLORIDE 1.25 MG: 1.25 SOLUTION RESPIRATORY (INHALATION) at 16:37

## 2025-04-05 RX ADMIN — HYDROMORPHONE HYDROCHLORIDE 1 MG: 1 INJECTION, SOLUTION INTRAMUSCULAR; INTRAVENOUS; SUBCUTANEOUS at 01:43

## 2025-04-05 RX ADMIN — FUROSEMIDE 40 MG: 10 INJECTION, SOLUTION INTRAVENOUS at 17:00

## 2025-04-05 RX ADMIN — OXYCODONE 5 MG: 5 TABLET ORAL at 17:31

## 2025-04-05 RX ADMIN — ENOXAPARIN SODIUM 40 MG: 40 INJECTION SUBCUTANEOUS at 08:09

## 2025-04-05 RX ADMIN — HYDROMORPHONE HYDROCHLORIDE 1 MG: 1 INJECTION, SOLUTION INTRAMUSCULAR; INTRAVENOUS; SUBCUTANEOUS at 19:49

## 2025-04-05 NOTE — PLAN OF CARE
Problem: Knowledge Deficit  Goal: Patient/family/caregiver demonstrates understanding of disease process, treatment plan, medications, and discharge instructions  Description: Complete learning assessment and assess knowledge base.Interventions:- Provide teaching at level of understanding- Provide teaching via preferred learning methods  Outcome: Progressing     Problem: GASTROINTESTINAL - ADULT  Goal: Minimal or absence of nausea and/or vomiting  Description: INTERVENTIONS:- Administer IV fluids if ordered to ensure adequate hydration- Maintain NPO status until nausea and vomiting are resolved- Nasogastric tube if ordered- Administer ordered antiemetic medications as needed- Provide nonpharmacologic comfort measures as appropriate- Advance diet as tolerated, if ordered- Consider nutrition services referral to assist patient with adequate nutrition and appropriate food choices  Outcome: Progressing

## 2025-04-05 NOTE — ASSESSMENT & PLAN NOTE
54-year-old female with past medical history of NELLI, hypertension, obesity, and myositis who presented to ED with back/abdominal pain with nausea/vomiting.  Found to have an elevated lipase level of greater than 12,000 and along with LFT abnormalities.  CT imaging demonstrating acute interstitial pancreatitis along with cholelithiasis but no evidence of cholecystitis or choledocholithiasis, no biliary dilation.  Patient without prior alcohol, tobacco, or marijuana use.  Medications reviewed.  Triglycerides normal and IgG normal.  Etiology of pancreatitis likely gallstone induced, advise CCY prior to discharge, timing TBD per surgery team.     Overnight, WBC elevated to 28k with tachycardia, no fevers. Pain improved from prior per patient but still ongoing and has generalized TTP with some guarding, no rigidity or rebound tenderness. Surgery team has ordered MRI to r/o cholangitis.     Plan:   Continue IVF hydration, would increase to 175cc/hr or give bolus due to elevating hematocrit and BUN and UOP of only 700cc   Monitor urine output along with BUN/sCr; maintain UO of >0.5 ml/kg/hour  Tolerating CLD   Follow up results of MRI/MRCP   If worsening abdominal pain or persistent organ failure, consider repeat CT imaging in 72 hours  Low threshold to broaden infectious workup and initiate antibiotics if she spikes fever or has other s/s infection   No indication to trend Lipase levels   General Surgery consulted, plan for cholecystectomy this admission  PRN pain and anti-emetics; additional symptom management per primary team   Continue DVT ppx

## 2025-04-05 NOTE — PROGRESS NOTES
Progress Note - Gastroenterology   Name: Darcie Reyes 54 y.o. female I MRN: [id number]  Unit/Bed#: E2 -01 I Date of Admission: 4/4/2025   Date of Service: 4/5/2025 I Hospital Day: 1    Assessment & Plan  Gallstone pancreatitis  54-year-old female with past medical history of NELLI, hypertension, obesity, and myositis who presented to ED with back/abdominal pain with nausea/vomiting.  Found to have an elevated lipase level of greater than 12,000 and along with LFT abnormalities.  CT imaging demonstrating acute interstitial pancreatitis along with cholelithiasis but no evidence of cholecystitis or choledocholithiasis, no biliary dilation.  Patient without prior alcohol, tobacco, or marijuana use.  Medications reviewed.  Triglycerides normal and IgG normal.  Etiology of pancreatitis likely gallstone induced, advise CCY prior to discharge, timing TBD per surgery team.     Overnight, WBC elevated to 28k with tachycardia, no fevers. Pain improved from prior per patient but still ongoing and has generalized TTP with some guarding, no rigidity or rebound tenderness. Surgery team has ordered MRI to r/o cholangitis.     Plan:   Continue IVF hydration, would increase to 175cc/hr or give bolus due to elevating hematocrit and BUN and UOP of only 700cc   Monitor urine output along with BUN/sCr; maintain UO of >0.5 ml/kg/hour  Tolerating CLD   Follow up results of MRI/MRCP   If worsening abdominal pain or persistent organ failure, consider repeat CT imaging in 72 hours  Low threshold to broaden infectious workup and initiate antibiotics if she spikes fever or has other s/s infection   No indication to trend Lipase levels   General Surgery consulted, plan for cholecystectomy this admission  PRN pain and anti-emetics; additional symptom management per primary team   Continue DVT ppx   Transaminitis  Patient with elevation in serum transaminases.  AST//185, , and total bilirubin 0.83 on admission. Trending down  today with AST 73, , , tbili 0.88. No evidence of cholecystitis or choledocholithiasis.  Continue to monitor and trend LFTs to ensure improvement. Follow up MRI as above.   Hepatic steatosis  Patient with CT imaging demonstrating hepatic steatosis.  Patient with multiple metabolic risk factors including hypertension, hyperlipidemia, and obesity placing patient at higher risk.  Patient with LFT elevations currently in setting of acute gallstone pancreatitis, the patient also with minor elevations in serum transaminases and alkaline phosphatase in the past.  Platelets normal.  Therefore, recommend outpatient GI follow-up for consideration of ultrasound elastography to further risk stratify and evaluate for advanced fibrosis.  Also recommend follow-up with PCP for management of metabolic risk factors.      Gastroenterology service will follow.    Subjective   Evaluated at bedside. PT [facility] [facility] services used.  also present. Patient reports pain is still present but improved from prior. Was able to tolerate water overnight. Has been voiding a lot since this AM. No other acute complaints.     Objective :  Temp:  [97.8 °F (36.6 °C)-99.6 °F (37.6 °C)] 98.8 °F (37.1 °C)  HR:  [] 133  BP: (128-167)/() 128/78  Resp:  [16-22] 20  SpO2:  [90 %-94 %] 90 %  O2 Device: None (Room air)    Physical Exam  Constitutional:       General: She is not in acute distress.     Appearance: She is obese. She is not ill-appearing.   Cardiovascular:      Rate and Rhythm: Tachycardia present.   Pulmonary:      Effort: Pulmonary effort is normal. No respiratory distress.   Abdominal:      General: Abdomen is protuberant.      Palpations: Abdomen is soft.      Tenderness: There is generalized abdominal tenderness. There is guarding. There is no rebound.   Skin:     General: Skin is warm and dry.      Coloration: Skin is not jaundiced.   Neurological:      Mental Status: She is alert.   Psychiatric:          Mood and Affect: Mood normal.           Lab Results: I have reviewed the following results:CBC/BMP:   .     04/05/25  0537   WBC 28.34*   HGB 15.1   HCT 46.9*      SODIUM 138   K 3.8      CO2 24   BUN 18   CREATININE 0.75   GLUC 154*    , LFTs:   .     04/05/25  0537   AST 73*   *   ALB 3.6   TBILI 0.88   ALKPHOS 125*    , Lipase:   Lab Results   Component Value Date    LIPASE 820 (H) 04/05/2025       Imaging Results Review: I reviewed radiology reports from this admission including: CT abdomen/pelvis.

## 2025-04-05 NOTE — ASSESSMENT & PLAN NOTE
SIRS with leukocytosis tachycardia and hypoxia  Currently placed on mid flow for oxygenation.    Concerns for vascular congestion given IV fluids and wall hold IV fluids and give a dose of furosemide  Check CXR    Results from last 7 days   Lab Units 04/05/25  0537   BNP pg/mL 39

## 2025-04-05 NOTE — PROGRESS NOTES
Progress Note - Hospitalist   Name: Darcie Reyes 54 y.o. female I MRN: [id number]  Unit/Bed#: E2 -01 I Date of Admission: 4/4/2025   Date of Service: 4/5/2025 I Hospital Day: 1    Assessment & Plan  Gallstone pancreatitis  History of hypertension obesity hyperlipidemia presented to the hospital for abdominal pain and nausea/vomiting  Symptoms consistent with gallstone pancreatitis  Seen by gastroenterology and surgery  Currently on CLD with IV fluids  Surgery anticipating cholecystectomy this admission  Due to rapidly improving pancreatitis and increased work of breathing/hypoxia will discontinue fluids for now.    Results from last 7 days   Lab Units 04/05/25 0537 04/04/25  0114   LIPASE u/L 820* >12,000*     SIRS (systemic inflammatory response syndrome) (HCC)  SIRS with leukocytosis tachycardia and hypoxia  Currently placed on mid flow for oxygenation.    Concerns for vascular congestion given IV fluids and wall hold IV fluids and give a dose of furosemide  Check CXR    Results from last 7 days   Lab Units 04/05/25  0537   BNP pg/mL 39     Transaminitis  Transaminitis secondary to suspected gallstone pancreatitis    Results from last 7 days   Lab Units 04/05/25 0537 04/04/25  0114   AST U/L 73* 229*   ALT U/L 139* 185*   TOTAL BILIRUBIN mg/dL 0.88 0.83     Class 3 severe obesity due to excess calories without serious comorbidity with body mass index (BMI) of 40.0 to 44.9 in adult ([facility])  Body mass index is 43.1 kg/m².  Essential hypertension  Prior to admission on lisinopril.  Ordered with hold parameters as patient hypotensive  Hepatic steatosis      VTE Pharmacologic Prophylaxis: VTE Score: 3 Moderate Risk (Score 3-4) - Pharmacological DVT Prophylaxis Ordered: enoxaparin (Lovenox).    Mobility:   Basic Mobility Inpatient Raw Score: 24  JH-HLM Goal: 8: Walk 250 feet or more  JH-HLM Achieved: 7: Walk 25 feet or more  JH-HLM Goal NOT achieved. Continue with multidisciplinary rounding and encourage  appropriate mobility to improve upon [facility] goals.    Patient Centered Rounds: I have performed bedside rounds with nursing staff today.  Discussions with Specialists or Other Care Team Provider: Case management GI surgery    Education and Discussions with Family / Patient: Updated  ( and daughter) at bedside.    Current Length of Stay: 1 day(s)  Current Patient Status: Inpatient   Certification Statement: The patient will continue to require additional inpatient hospital stay due to pancreatitis, hypoxia  Discharge Plan: Anticipate discharge in >72 hrs to home.    Code Status: Level 1 - Full Code    Subjective   Patient seen and examined.  Tachycardic and hypoxic today    Objective   Vitals:   Temp (24hrs), Av °F (37.2 °C), Min:98.4 °F (36.9 °C), Max:99.6 °F (37.6 °C)    Temp:  [98.4 °F (36.9 °C)-99.6 °F (37.6 °C)] 98.4 °F (36.9 °C)  HR:  [125-138] 138  Resp:  [20-22] 20  BP: ()/() 103/80  SpO2:  [88 %-91 %] 91 %  Body mass index is 43.1 kg/m².     Input and Output Summary (last 24 hours):     Intake/Output Summary (Last 24 hours) at 2025 1653  Last data filed at 2025 1400  Gross per 24 hour   Intake 2757.5 ml   Output 590 ml   Net 2167.5 ml       Physical Exam  Vitals reviewed.   Constitutional:       General: She is not in acute distress.     Appearance: Normal appearance. She is obese.   HENT:      Head: Atraumatic.   Cardiovascular:      Rate and Rhythm: Regular rhythm. Tachycardia present.      Heart sounds:      No gallop.   Pulmonary:      Breath sounds: Decreased breath sounds present.      Comments: Increased work of breathing  Abdominal:      General: Bowel sounds are normal.      Palpations: Abdomen is soft.      Tenderness: There is abdominal tenderness. There is no guarding.   Musculoskeletal:         General: No tenderness or deformity.   Skin:     General: Skin is warm.      Coloration: Skin is not jaundiced.   Neurological:      General: No focal deficit  present.      Mental Status: She is alert.      Motor: No weakness.   Psychiatric:         Mood and Affect: Mood normal.       Lines/Drains:  Invasive Devices       Peripheral Intravenous Line  Duration             Peripheral IV 04/04/25 Left;Dorsal (posterior) Hand 1 day                      Telemetry:  Telemetry Orders (From admission, onward)               24 Hour Telemetry Monitoring  Continuous x 24 Hours (Telem)        Expiring   Question:  Reason for 24 Hour Telemetry  Answer:  PCI/EP study (including pacer and ICD implementation), Cardiac surgery, MI, abnormal cardiac cath, and chest pain- rule out MI                     Telemetry Reviewed: Sinus Tachycardia  Indication for Continued Telemetry Use:                Lab Results: I have reviewed the following results:   Results from last 7 days   Lab Units 04/05/25  0537 04/04/25  0114   WBC Thousand/uL 28.34* 16.89*   HEMOGLOBIN g/dL 15.1 14.8   PLATELETS Thousands/uL 184 223   MCV fL 95 93   TOTAL NEUT ABS Thousand/uL 25.79* 10.64*   BANDS PCT % 6  --      Results from last 7 days   Lab Units 04/05/25  0537 04/04/25  0114   SODIUM mmol/L 138 139   POTASSIUM mmol/L 3.8 3.8   CHLORIDE mmol/L 108 104   CO2 mmol/L 24 25   ANION GAP mmol/L 6 10   BUN mg/dL 18 14   CREATININE mg/dL 0.75 0.84   CALCIUM mg/dL 8.0* 9.7   ALBUMIN g/dL 3.6 4.4   TOTAL BILIRUBIN mg/dL 0.88 0.83   ALK PHOS U/L 125* 204*   ALT U/L 139* 185*   AST U/L 73* 229*   EGFR ml/min/1.73sq m 90 78   GLUCOSE RANDOM mg/dL 154* 167*     Results from last 7 days   Lab Units 04/04/25  0114   MAGNESIUM mg/dL 2.0         Results from last 7 days   Lab Units 04/04/25  0114   HS TNI 0HR ng/L <2                            Recent Cultures (last 7 days):         Imaging:  Reviewed radiology reports from this admission including:  MRI abdomen w wo contrast and mrcp  Result Date: 4/5/2025  Impression: 1.  Acute pancreatitis with few foci of relative hypoenhancement within the pancreatic body/tail, not definitely  seen on recent CT, possibly representing early pancreatic necrosis, noting limited evaluation due to motion degradation. No organized peripancreatic fluid collection. 2.  Mid to distal common bile duct wall thickening and enhancement, most pronounced on delayed phase, which may be reactive to the adjacent acute pancreatitis, given similar findings of the distal pancreatic duct. The lack of choledocholithiasis or biliary dilatation argues against cholangitis. 3.  Small bilateral pleural effusions and bibasilar atelectasis. 4.  Hepatic steatosis. Workstation performed: [id number]     CT abdomen pelvis with contrast  Result Date: 4/4/2025  Impression: Acute uncomplicated pancreatitis. Cholelithiasis Workstation performed: [id number]       Last 24 Hours Medication List:     Current Facility-Administered Medications:     acetaminophen (TYLENOL) tablet 975 mg, Q6H PRN    aluminum-magnesium hydroxide-simethicone (MAALOX) oral suspension 30 mL, Q6H PRN    enoxaparin (LOVENOX) subcutaneous injection 40 mg, Daily    hydrALAZINE (APRESOLINE) injection 5 mg, Q6H PRN    HYDROmorphone (DILAUDID) injection 1 mg, Q4H PRN    iohexol (OMNIPAQUE) 350 MG/ML injection (MULTI-DOSE) 100 mL, Once in imaging    lactated ringers infusion, Continuous, Last Rate: Stopped (04/05/25 1618)    lisinopril (ZESTRIL) tablet 5 mg, Daily    ondansetron (ZOFRAN) injection 4 mg, Q6H PRN    oxyCODONE (ROXICODONE) IR tablet 5 mg, Q4H PRN    polyethylene glycol (MIRALAX) packet 17 g, Daily PRN    simethicone (MYLICON) chewable tablet 80 mg, 4x Daily PRN    Administrative Statements   Today, Patient Was Seen By: Carlo Villeda, DO  I have spent a total time of 35 minutes in caring for this patient on the day of the visit/encounter including Diagnostic results, Patient and family education, Counseling / Coordination of care, Documenting in the medical record, Reviewing/placing orders in the medical record (including tests, medications, and/or procedures), and  Communicating with other healthcare professionals .    **Please Note: This note may have been constructed using a voice recognition system.**

## 2025-04-05 NOTE — ASSESSMENT & PLAN NOTE
54-year-old female with gallstone pancreatitis without cholecystitis    Continued pain this morning.  Tachycardic.    WBC 28.34 from 16.89  Hemoglobin 15.1 from 14.8  Creatinine 0.75 from 0.84  Total bilirubin 0.88 from 0.83    Plan  Continue clear liquid diet with IV fluid resuscitation  Given worsening leukocytosis and continued abdominal pain, will r/o cholangitis with MRCP today  At this time, observe off antibiotics  Pain control PRN  Remainder of care per primary team

## 2025-04-05 NOTE — PROGRESS NOTES
"Progress Note - Surgery-General   Name: Darcie Reyes 54 y.o. female I MRN: [id number]  Unit/Bed#: E2 -01 I Date of Admission: 2025   Date of Service: 2025 I Hospital Day: 1     Assessment & Plan  Gallstone pancreatitis  54-year-old female with gallstone pancreatitis without cholecystitis    Continued pain this morning.  Tachycardic.    WBC 28.34 from 16.89  Hemoglobin 15.1 from 14.8  Creatinine 0.75 from 0.84  Total bilirubin 0.88 from 0.83    Plan  Continue clear liquid diet with IV fluid resuscitation  Given worsening leukocytosis and continued abdominal pain, will r/o cholangitis with MRCP today  At this time, observe off antibiotics  Pain control PRN  Remainder of care per primary team    Subjective/Objective     Subjective:   Patient seen and examined at bedside, in no acute distress. No acute events overnight. Continued and worsening epigastric pain with nausea, no vomiting.     Objective:   Vitals:Blood pressure 128/78, pulse (!) 133, temperature 98.8 °F (37.1 °C), temperature source Temporal, resp. rate 20, height 5' 2\" (1.575 m), weight 107 kg (235 lb 10.8 oz), SpO2 90%.  Temp (24hrs), Av.9 °F (37.2 °C), Min:97.8 °F (36.6 °C), Max:99.6 °F (37.6 °C)        Intake/Output Summary (Last 24 hours) at 2025 0938  Last data filed at 2025 0600  Gross per 24 hour   Intake 2757.5 ml   Output 740 ml   Net 2017.5 ml       Invasive Devices       Peripheral Intravenous Line  Duration             Peripheral IV 25 Left;Dorsal (posterior) Hand 1 day                    Physical Exam:  General: No acute distress, alert and oriented  CV: Well perfused, regular rate and rhythm  Lungs: Normal work of breathing, no increased respiratory effort   Abdomen: Soft, epigastric tenderness, voluntary guarding. No distension  Extremities: No edema, clubbing or cyanosis  Skin: Warm, dry    Lab Results: BMP/CMP:   Lab Results   Component Value Date    SODIUM 138 2025    K 3.8 2025     " 04/05/2025    CO2 24 04/05/2025    BUN 18 04/05/2025    CREATININE 0.75 04/05/2025    CALCIUM 8.0 (L) 04/05/2025    AST 73 (H) 04/05/2025     (H) 04/05/2025    ALKPHOS 125 (H) 04/05/2025    EGFR 90 04/05/2025    and CBC:   Lab Results   Component Value Date    WBC 28.34 (H) 04/05/2025    HGB 15.1 04/05/2025    HCT 46.9 (H) 04/05/2025    MCV 95 04/05/2025     04/05/2025    RBC 4.92 04/05/2025    MCH 30.7 04/05/2025    MCHC 32.2 04/05/2025    RDW 14.2 04/05/2025    MPV 12.9 (H) 04/05/2025     VTE Prophylaxis: Sequential compression device (Venodyne)  and Enoxaparin (Lovenox)    Denita Shea MD  4/5/2025

## 2025-04-05 NOTE — ASSESSMENT & PLAN NOTE
Patient with elevation in serum transaminases.  AST//185, , and total bilirubin 0.83 on admission. Trending down today with AST 73, , , tbili 0.88. No evidence of cholecystitis or choledocholithiasis.  Continue to monitor and trend LFTs to ensure improvement. Follow up MRI as above.

## 2025-04-06 PROBLEM — J98.4 ACUTE PULMONARY INSUFFICIENCY: Status: ACTIVE | Noted: 2025-04-06

## 2025-04-06 PROBLEM — N17.9 AKI (ACUTE KIDNEY INJURY) (HCC): Status: ACTIVE | Noted: 2025-04-06

## 2025-04-06 PROBLEM — K85.90 PANCREATITIS, ACUTE: Status: ACTIVE | Noted: 2025-04-04

## 2025-04-06 PROBLEM — R06.89 ACUTE RESPIRATORY INSUFFICIENCY: Status: ACTIVE | Noted: 2025-04-06

## 2025-04-06 LAB
ALBUMIN SERPL BCG-MCNC: 3.4 G/DL (ref 3.5–5)
ALP SERPL-CCNC: 95 U/L (ref 34–104)
ALT SERPL W P-5'-P-CCNC: 79 U/L (ref 7–52)
ANION GAP SERPL CALCULATED.3IONS-SCNC: 7 MMOL/L (ref 4–13)
AST SERPL W P-5'-P-CCNC: 51 U/L (ref 13–39)
ATRIAL RATE: 131 BPM
BILIRUB SERPL-MCNC: 0.99 MG/DL (ref 0.2–1)
BUN SERPL-MCNC: 36 MG/DL (ref 5–25)
CALCIUM ALBUM COR SERPL-MCNC: 8.2 MG/DL (ref 8.3–10.1)
CALCIUM SERPL-MCNC: 7.7 MG/DL (ref 8.4–10.2)
CHLORIDE SERPL-SCNC: 102 MMOL/L (ref 96–108)
CO2 SERPL-SCNC: 24 MMOL/L (ref 21–32)
CREAT SERPL-MCNC: 2.06 MG/DL (ref 0.6–1.3)
ERYTHROCYTE [DISTWIDTH] IN BLOOD BY AUTOMATED COUNT: 14.4 % (ref 11.6–15.1)
GFR SERPL CREATININE-BSD FRML MDRD: 26 ML/MIN/1.73SQ M
GLUCOSE SERPL-MCNC: 132 MG/DL (ref 65–140)
HCT VFR BLD AUTO: 44.5 % (ref 34.8–46.1)
HGB BLD-MCNC: 14.3 G/DL (ref 11.5–15.4)
LACTATE SERPL-SCNC: 1 MMOL/L (ref 0.5–2)
LIPASE SERPL-CCNC: 402 U/L (ref 11–82)
MCH RBC QN AUTO: 30.9 PG (ref 26.8–34.3)
MCHC RBC AUTO-ENTMCNC: 32.1 G/DL (ref 31.4–37.4)
MCV RBC AUTO: 96 FL (ref 82–98)
P AXIS: 49 DEGREES
PLATELET # BLD AUTO: 135 THOUSANDS/UL (ref 149–390)
PMV BLD AUTO: 12.1 FL (ref 8.9–12.7)
POTASSIUM SERPL-SCNC: 5 MMOL/L (ref 3.5–5.3)
PR INTERVAL: 126 MS
PROCALCITONIN SERPL-MCNC: 2.74 NG/ML
PROT SERPL-MCNC: 6.5 G/DL (ref 6.4–8.4)
QRS AXIS: -13 DEGREES
QRSD INTERVAL: 72 MS
QT INTERVAL: 294 MS
QTC INTERVAL: 434 MS
RBC # BLD AUTO: 4.63 MILLION/UL (ref 3.81–5.12)
SODIUM SERPL-SCNC: 133 MMOL/L (ref 135–147)
T WAVE AXIS: 20 DEGREES
VENTRICULAR RATE: 131 BPM
WBC # BLD AUTO: 28.87 THOUSAND/UL (ref 4.31–10.16)

## 2025-04-06 PROCEDURE — 83690 ASSAY OF LIPASE: CPT | Performed by: INTERNAL MEDICINE

## 2025-04-06 PROCEDURE — 80053 COMPREHEN METABOLIC PANEL: CPT | Performed by: INTERNAL MEDICINE

## 2025-04-06 PROCEDURE — 99232 SBSQ HOSP IP/OBS MODERATE 35: CPT | Performed by: STUDENT IN AN ORGANIZED HEALTH CARE EDUCATION/TRAINING PROGRAM

## 2025-04-06 PROCEDURE — 83605 ASSAY OF LACTIC ACID: CPT | Performed by: INTERNAL MEDICINE

## 2025-04-06 PROCEDURE — 99233 SBSQ HOSP IP/OBS HIGH 50: CPT | Performed by: SURGERY

## 2025-04-06 PROCEDURE — 84145 PROCALCITONIN (PCT): CPT | Performed by: INTERNAL MEDICINE

## 2025-04-06 PROCEDURE — 93010 ELECTROCARDIOGRAM REPORT: CPT | Performed by: INTERNAL MEDICINE

## 2025-04-06 PROCEDURE — 87040 BLOOD CULTURE FOR BACTERIA: CPT | Performed by: INTERNAL MEDICINE

## 2025-04-06 PROCEDURE — 99232 SBSQ HOSP IP/OBS MODERATE 35: CPT | Performed by: INTERNAL MEDICINE

## 2025-04-06 PROCEDURE — 93005 ELECTROCARDIOGRAM TRACING: CPT

## 2025-04-06 PROCEDURE — 85027 COMPLETE CBC AUTOMATED: CPT | Performed by: INTERNAL MEDICINE

## 2025-04-06 PROCEDURE — 99223 1ST HOSP IP/OBS HIGH 75: CPT | Performed by: INTERNAL MEDICINE

## 2025-04-06 RX ORDER — SODIUM CHLORIDE, SODIUM LACTATE, POTASSIUM CHLORIDE, CALCIUM CHLORIDE 600; 310; 30; 20 MG/100ML; MG/100ML; MG/100ML; MG/100ML
125 INJECTION, SOLUTION INTRAVENOUS CONTINUOUS
Status: DISCONTINUED | OUTPATIENT
Start: 2025-04-06 | End: 2025-04-08

## 2025-04-06 RX ORDER — ACETAMINOPHEN 325 MG/1
975 TABLET ORAL EVERY 8 HOURS PRN
Status: DISCONTINUED | OUTPATIENT
Start: 2025-04-06 | End: 2025-04-10 | Stop reason: HOSPADM

## 2025-04-06 RX ORDER — ALBUMIN HUMAN 50 G/1000ML
25 SOLUTION INTRAVENOUS ONCE
Status: COMPLETED | OUTPATIENT
Start: 2025-04-06 | End: 2025-04-06

## 2025-04-06 RX ORDER — CHLORHEXIDINE GLUCONATE ORAL RINSE 1.2 MG/ML
15 SOLUTION DENTAL EVERY 12 HOURS SCHEDULED
Status: DISCONTINUED | OUTPATIENT
Start: 2025-04-06 | End: 2025-04-10 | Stop reason: HOSPADM

## 2025-04-06 RX ADMIN — ALBUMIN (HUMAN) 25 G: 12.5 INJECTION, SOLUTION INTRAVENOUS at 08:16

## 2025-04-06 RX ADMIN — ACETAMINOPHEN 975 MG: 325 TABLET, FILM COATED ORAL at 23:02

## 2025-04-06 RX ADMIN — OXYCODONE 5 MG: 5 TABLET ORAL at 11:30

## 2025-04-06 RX ADMIN — HYDROMORPHONE HYDROCHLORIDE 1 MG: 1 INJECTION, SOLUTION INTRAMUSCULAR; INTRAVENOUS; SUBCUTANEOUS at 01:55

## 2025-04-06 RX ADMIN — SODIUM CHLORIDE, SODIUM LACTATE, POTASSIUM CHLORIDE, AND CALCIUM CHLORIDE 125 ML/HR: .6; .31; .03; .02 INJECTION, SOLUTION INTRAVENOUS at 17:44

## 2025-04-06 RX ADMIN — OXYCODONE 5 MG: 5 TABLET ORAL at 00:24

## 2025-04-06 RX ADMIN — SODIUM CHLORIDE, SODIUM LACTATE, POTASSIUM CHLORIDE, AND CALCIUM CHLORIDE 100 ML/HR: .6; .31; .03; .02 INJECTION, SOLUTION INTRAVENOUS at 09:14

## 2025-04-06 RX ADMIN — HYDROMORPHONE HYDROCHLORIDE 1 MG: 1 INJECTION, SOLUTION INTRAMUSCULAR; INTRAVENOUS; SUBCUTANEOUS at 08:15

## 2025-04-06 RX ADMIN — ACETAMINOPHEN 975 MG: 325 TABLET, FILM COATED ORAL at 16:10

## 2025-04-06 RX ADMIN — OXYCODONE 5 MG: 5 TABLET ORAL at 05:24

## 2025-04-06 RX ADMIN — HYDROMORPHONE HYDROCHLORIDE 1 MG: 1 INJECTION, SOLUTION INTRAMUSCULAR; INTRAVENOUS; SUBCUTANEOUS at 17:45

## 2025-04-06 RX ADMIN — LISINOPRIL 5 MG: 5 TABLET ORAL at 08:14

## 2025-04-06 RX ADMIN — OXYCODONE 5 MG: 5 TABLET ORAL at 16:51

## 2025-04-06 RX ADMIN — CHLORHEXIDINE GLUCONATE 15 ML: 1.2 SOLUTION ORAL at 22:28

## 2025-04-06 RX ADMIN — ENOXAPARIN SODIUM 40 MG: 40 INJECTION SUBCUTANEOUS at 08:20

## 2025-04-06 NOTE — ASSESSMENT & PLAN NOTE
Likely due to poor respiratory effort/splinting from abdominal pain  IV fluids were stopped yesterday with a dose of IV furosemide.  BNP within limits and CXR without vascular congestion.  Continue oxygen support with mid flow.  IV fluids restarted for kidney injury/pancreatitis    Results from last 7 days   Lab Units 04/05/25  0537   BNP pg/mL 39

## 2025-04-06 NOTE — ASSESSMENT & PLAN NOTE
Dr. Brown    Could a new order to diabetes services please be placed for Don, so he can again see one of the educators ?   Thank you,  Shari Castro  Educational Services       History of hypertension obesity hyperlipidemia presented to the hospital for abdominal pain and nausea/vomiting  Symptoms concerning for gallstone pancreatitis  Being followed by gastroenterology and surgery  Currently on CLD   Surgery anticipating cholecystectomy this admission  IV fluids were held last night due to increased work of breathing but will restart given kidney injury    Results from last 7 days   Lab Units 04/06/25  0530 04/05/25  0537 04/04/25  0114   LIPASE u/L 402* 820* >12,000*

## 2025-04-06 NOTE — ASSESSMENT & PLAN NOTE
Requirement of mid flow oxygen, thought to be concerning for vascular congestion, IV fluids were held and patient was given a dose of Lasix 40 mg  Chest x-ray does not look concerning for prominent pulmonary vasculature  IV fluids resumed on 4/6, given kidney injury and pancreatitis    Plan:  On 2 L nasal cannula, ween oxygen as tolerated  Lab work appears to be improving, abdominal exam and pain is improving  New change in respiratory status is likely all due to atelectasis, seen on chest x-ray  Encouraging OOB, using incentive spirometer and trying Tylenol before narcotic pain medication

## 2025-04-06 NOTE — ASSESSMENT & PLAN NOTE
SIRS with leukocytosis tachycardia and hypoxia  Placed on mid flow for oxygenation.    Given persistent tachycardia and leukocytosis with elevated procalcitonin will check blood cultures and lactic acid    Results from last 7 days   Lab Units 04/06/25  0530 04/05/25  0537 04/04/25  0114   WBC Thousand/uL 28.87* 28.34* 16.89*   PROCALCITONIN ng/ml 2.74*  --   --

## 2025-04-06 NOTE — ASSESSMENT & PLAN NOTE
Creatinine 2.06, increased from baseline around 0.7    Plan:  Continue IV fluids,  Monitor daily BMP  Hold home medication lisinopril and avoid other nephrotoxic meds

## 2025-04-06 NOTE — UTILIZATION REVIEW
Initial Clinical Review    Admission: Date/Time/Statement:   Admission Orders (From admission, onward)       Ordered        04/04/25 0321  INPATIENT ADMISSION  Once                          Orders Placed This Encounter   Procedures    INPATIENT ADMISSION     Standing Status:   Standing     Number of Occurrences:   1     Level of Care:   Med Surg [16]     Estimated length of stay:   More than 2 Midnights     Certification:   I certify that inpatient services are medically necessary for this patient for a duration of greater than two midnights. See H&P and MD Progress Notes for additional information about the patient's course of treatment.     ED Arrival Information       Expected   -    Arrival   4/4/2025 00:23    Acuity   Urgent              Means of arrival   Walk-In    Escorted by   Family Member    Service   Critical Care/ICU    Admission type   Emergency              Arrival complaint   ABD pain             Chief Complaint   Patient presents with    Abdominal Pain     Sudden onset abd pain. Vomited 1x       Initial Presentation: 54 y.o. female to ED presents for Severe abdominal pain. Pt reports pain started tonight in back, took Advil with no improvement. Pain became so severe she started to vomit and could not walk, was screaming and crying due to intensity of pain.  PMH for HTN, HLD, NELLI   Admit to Inpatient Dx; Idiopathic acute pancreatitis, Transaminitis, Cholelithiasis  Lipase: >12,000   Plan; NPO. IVFs. Trend serum lipase. FLP to assess triglycerides. GI and Surgery consult. Trend CMP.   Hold lisinopril. Pain and antiemetics control prn.   On exam; Decreased bowel sounds. Abdominal tenderness. Morbid obesity. Somnolent    Anticipated Length of Stay/Certification Statement: Patient will be admitted on an inpatient basis with an anticipated length of stay of greater than 2 midnights secondary to pancreatitis with cholelithiasis and transaminitis.     4/4  General Surgery cons; Gall Pancreatitis  Plan for  Lap Francia this admit. IVFs. Pain/nausea control prn.  On exam; Mild periumbilical tenderness    GI cons; Suspect gallstone etiology of her pancreatitis. Leukocytosis 16.9, Alk phos is mildly elevated to 204.   Aggressive IVFs. Monitor Bun/Creat. Plan for Lap Francia with Surgery. F/u IgG levels.   Monitor off antibiotics. Clear liquid diet unless surgery is plan for today.   Pain and nausea control prn.    Date: 4/5   Day 2:   Per GI; Hepatocellular liver injury improving though worsened leukocytosis to 28k today.   Clear liquid diet and tolerating. IVF, would increase to 175cc/hr or give bolus due to elevating hematocrit and BUN and UOP of only 700cc.   F/u results of MRI/MRCP. Pain control.     Per General-Surgery; Tachycardic, continued pain this am, WBC 28.34 from 16.89   Continue clear liquid diet. IVFs.   Given worsening leukocytosis and continued abdominal pain, will r/o cholangitis with MRCP today.  Pain control prn.  On exam; voluntary guarding    Progress notes; SIRS. Leukocytosis, tachycardia and hypoxia.  Currently placed on 10 L mild flow  O2 for 88-90% sat. CXR.   Continue Clear liquid diet and IVFs.  Planf or OR for Francia this admit.   Due to rapidly improving pancreatitis and increased work of breathing/hypoxia will discontinue fluids for now.   Concerns for vascular congestion given IV fluids and wall hold IV fluids and give a dose of furosemide   On exam; Decrease breath sounds, Tachycardia  Certification Statement: The patient will continue to require additional inpatient hospital stay due to pancreatitis, hypoxia     ED Treatment-Medication Administration from 04/04/2025 0023 to 04/04/2025 0430         Date/Time Order Dose Route Action     04/04/2025 0110 sodium chloride 0.9 % bolus 1,000 mL 1,000 mL Intravenous New Bag     04/04/2025 0110 morphine injection 4 mg 4 mg Intravenous Given     04/04/2025 0111 Famotidine (PF) (PEPCID) injection 20 mg 20 mg Intravenous Given     04/04/2025 0151 iohexol  (OMNIPAQUE) 350 MG/ML injection (MULTI-DOSE) 100 mL 100 mL Intravenous Given     04/04/2025 0340 ketorolac (TORADOL) injection 30 mg 30 mg Intravenous Given            Scheduled Medications:  chlorhexidine, 15 mL, Mouth/Throat, Q12H GRZEGORZ  enoxaparin, 40 mg, Subcutaneous, Daily  [Held by provider] lisinopril, 5 mg, Oral, Daily      Continuous IV Infusions:  lactated ringers, 125 mL/hr, Intravenous, Continuous      PRN Meds:  aluminum-magnesium hydroxide-simethicone, 30 mL, Oral, Q6H PRN  hydrALAZINE, 5 mg, Intravenous, Q6H PRN  HYDROmorphone, 1 mg, Intravenous, Q4H PRN  ondansetron, 4 mg, Intravenous, Q6H PRN  oxyCODONE, 5 mg, Oral, Q4H PRN  polyethylene glycol, 17 g, Oral, Daily PRN  simethicone, 80 mg, Oral, 4x Daily PRN      ED Triage Vitals   Temperature Pulse Respirations Blood Pressure SpO2 Pain Score   04/04/25 0051 04/04/25 0027 04/04/25 0027 04/04/25 0028 04/04/25 0027 04/04/25 0110   97.8 °F (36.6 °C) 90 20 134/74 97 % 10 - Worst Possible Pain     Weight (last 2 days)       Date/Time Weight    04/06/25 1215 111 (245.15)    04/04/25 0435 107 (235.67)            Vital Signs (last 3 days)       Date/Time Temp Pulse Resp BP MAP (mmHg) SpO2 Calculated FIO2 (%) - Nasal Cannula Nasal Cannula O2 Flow Rate (L/min) O2 Device Patient Position - Orthostatic VS [place] Coma Scale Score Pain    04/06/25 1200 -- 126 24 102/59 88 96 % -- -- -- -- -- --    04/06/25 1130 -- 130 24 -- -- 96 % -- -- -- -- -- 6    04/06/25 1100 101.1 °F (38.4 °C) 126 18 122/77 104 97 % -- -- -- -- -- --    04/06/25 1000 -- 128 19 106/67 93 97 % -- -- -- -- -- --    04/06/25 0950 99.9 °F (37.7 °C) 128 14 118/70 85 95 % 60 10 L/min Mid flow nasal cannula Lying 15 5    04/06/25 0823 -- -- -- -- -- -- -- -- -- -- 15 --    04/06/25 0815 -- -- -- -- -- -- -- -- -- -- -- 6 04/06/25 0708 98.7 °F (37.1 °C) 132 22 123/77 100 94 % 60 10 L/min Mid flow nasal cannula Lying -- --    04/06/25 0524 -- -- -- -- -- -- -- -- -- -- -- 6 04/06/25 0224 97.7 °F  (36.5 °C) 136 20 108/65 -- 93 % -- -- Mid flow nasal cannula -- -- --    04/06/25 0155 -- -- -- -- -- -- -- -- -- -- -- 7 04/06/25 0024 -- -- -- -- -- -- -- -- -- -- -- 7 04/05/25 2227 98.2 °F (36.8 °C) 130 20 119/71 82 93 % 60 10 L/min Mid flow nasal cannula Lying 15 --    04/05/25 1949 -- -- -- -- -- -- -- -- -- -- -- 8 04/05/25 1858 98 °F (36.7 °C) 133 20 141/81 88 94 % 60 10 L/min Mid flow nasal cannula Lying -- --    04/05/25 1731 -- -- -- -- -- -- -- -- -- -- -- 8 04/05/25 1656 -- 144 -- 116/83 89 92 % 60 10 L/min Mid flow nasal cannula -- -- --    04/05/25 1651 -- -- -- -- -- -- 60 10 L/min Mid flow nasal cannula -- -- --    04/05/25 1638 -- -- -- -- -- -- 44 6 L/min Nasal cannula -- -- --    04/05/25 1637 -- -- -- -- -- 91 % -- -- -- -- -- --    04/05/25 1609 -- -- -- -- -- 90 % 44 6 L/min Nasal cannula -- -- --    04/05/25 1607 -- -- 20 103/80 85 88 % 40 5 L/min Nasal cannula Lying -- --    04/05/25 1603 -- 138 -- 92/64 75 -- -- -- -- Lying -- --    04/05/25 1443 98.4 °F (36.9 °C) 125 20 100/70 85 90 % -- -- None (Room air) Lying -- --    04/05/25 1225 -- -- -- -- -- -- -- -- -- -- -- 8    04/05/25 0810 -- -- -- -- -- -- -- -- -- -- 15 --    04/05/25 0806 -- -- -- -- -- -- -- -- -- -- -- 8    04/05/25 0716 98.8 °F (37.1 °C) 133 20 128/78 94 90 % -- -- None (Room air) Lying -- --    04/05/25 0642 -- -- -- -- -- -- -- -- -- -- -- 10 - Worst Possible Pain    04/05/25 0143 -- -- -- -- -- -- -- -- -- -- -- 10 - Worst Possible Pain    04/04/25 2300 99.6 °F (37.6 °C) 126 20 131/80 99 91 % -- -- Nasal cannula Lying -- --    04/04/25 2044 99.2 °F (37.3 °C) 133 22 140/102 -- -- -- -- -- -- -- 10 - Worst Possible Pain    04/04/25 1940 -- -- -- -- -- -- -- -- -- -- -- 6    04/04/25 1648 -- -- -- -- -- -- -- -- -- -- -- 10 - Worst Possible Pain    04/04/25 1528 97.8 °F (36.6 °C) 89 16 167/93 117 94 % -- -- None (Room air) Lying -- --    04/04/25 1441 -- -- -- -- -- -- -- -- -- -- -- 5    04/04/25 0909 -- --  -- -- -- -- -- -- -- -- -- 7    04/04/25 0847 -- -- -- -- -- -- -- -- -- -- 15 7    04/04/25 0718 97 °F (36.1 °C) 94 17 148/92 106 95 % -- -- None (Room air) Lying -- --    04/04/25 0540 -- -- -- -- -- -- -- -- -- -- -- 8    04/04/25 0435 98.4 °F (36.9 °C) 83 18 160/93 108 94 % -- -- None (Room air) Lying -- 2    04/04/25 0340 -- -- -- -- -- -- -- -- -- -- -- 7    04/04/25 0253 -- -- -- -- -- -- -- -- -- -- 15 --    04/04/25 0230 -- 81 20 137/63 91 98 % -- -- None (Room air) Lying -- --    04/04/25 0200 -- 89 17 128/72 95 97 % -- -- None (Room air) Lying -- --    04/04/25 0110 -- -- -- -- -- -- -- -- -- -- -- 10 - Worst Possible Pain    04/04/25 0051 97.8 °F (36.6 °C) -- -- -- -- -- -- -- -- -- -- --    04/04/25 0028 -- -- -- 134/74 -- -- -- -- -- -- -- --    04/04/25 0027 -- 90 20 -- -- 97 % -- -- None (Room air) Sitting -- --              Pertinent Labs/Diagnostic Test Results:   Radiology:  XR chest portable   Final Interpretation by Amber Ann MD (04/06 0638)      Low lung volumes with left greater than right bibasilar opacity, likely atelectasis. Pneumonia not excluded in the appropriate clinical setting.      Hyperdense objects in the splenic flexure which could be due to gallstones given history of gallstone pancreatitis.            Workstation performed: [id number]         MRI abdomen w wo contrast and mrcp   Final Interpretation by Bird Leon MD (04/05 1220)      1.  Acute pancreatitis with few foci of relative hypoenhancement within the pancreatic body/tail, not definitely seen on recent CT, possibly representing early pancreatic necrosis, noting limited evaluation due to motion degradation. No organized    peripancreatic fluid collection.   2.  Mid to distal common bile duct wall thickening and enhancement, most pronounced on delayed phase, which may be reactive to the adjacent acute pancreatitis, given similar findings of the distal pancreatic duct. The lack of choledocholithiasis or     biliary dilatation argues against cholangitis.   3.  Small bilateral pleural effusions and bibasilar atelectasis.   4.  Hepatic steatosis.      Workstation performed: [id number]         CT abdomen pelvis with contrast   Final Interpretation by Marisol Zamarripa MD (04/04 0208)      Acute uncomplicated pancreatitis.      Cholelithiasis         Workstation performed: [id number]           Cardiology:  ECG 12 lead   Final Result by Emma Omer DO (04/06 1223)   Sinus tachycardia   Inferior infarct (cited on or before 04-Apr-2025)   Abnormal ECG   When compared with ECG of 04-Apr-2025 01:18,   Criteria for Anterior infarct are no longer Present   Criteria for Anterolateral infarct are no longer Present   Confirmed by Emma Omer ([id number]) on 4/6/2025 12:23:39 PM      ECG 12 lead   Final Result by Derick Chandler MD (04/04 0837)   Normal sinus rhythm   Left axis deviation   Inferior-posterior infarct , age undetermined   Anterolateral infarct , age undetermined   Abnormal ECG   No previous ECGs available   Confirmed by Derick Chandler ([id number]) on 4/4/2025 8:37:10 AM        GI:  No orders to display           Results from last 7 days   Lab Units 04/06/25 0530 04/05/25  0537 04/04/25  0114   WBC Thousand/uL 28.87* 28.34* 16.89*   HEMOGLOBIN g/dL 14.3 15.1 14.8   HEMATOCRIT % 44.5 46.9* 44.7   PLATELETS Thousands/uL 135* 184 223   BANDS PCT %  --  6  --          Results from last 7 days   Lab Units 04/06/25 0530 04/05/25  0537 04/04/25  0114   SODIUM mmol/L 133* 138 139   POTASSIUM mmol/L 5.0 3.8 3.8   CHLORIDE mmol/L 102 108 104   CO2 mmol/L 24 24 25   ANION GAP mmol/L 7 6 10   BUN mg/dL 36* 18 14   CREATININE mg/dL 2.06* 0.75 0.84   EGFR ml/min/1.73sq m 26 90 78   CALCIUM mg/dL 7.7* 8.0* 9.7   MAGNESIUM mg/dL  --   --  2.0     Results from last 7 days   Lab Units 04/06/25 0530 04/05/25  0537 04/04/25  0114   AST U/L 51* 73* 229*   ALT U/L 79* 139* 185*   ALK PHOS U/L 95 125* 204*   TOTAL PROTEIN g/dL 6.5 6.4 8.0  "  ALBUMIN g/dL 3.4* 3.6 4.4   TOTAL BILIRUBIN mg/dL 0.99 0.88 0.83         Results from last 7 days   Lab Units 04/06/25  0530 04/05/25  0537 04/04/25  0114   GLUCOSE RANDOM mg/dL 132 154* 167*             No results found for: \"BETA-HYDROXYBUTYRATE\"                   Results from last 7 days   Lab Units 04/04/25  0114   HS TNI 0HR ng/L <2                 Results from last 7 days   Lab Units 04/06/25  0530   PROCALCITONIN ng/ml 2.74*     Results from last 7 days   Lab Units 04/06/25  0911   LACTIC ACID mmol/L 1.0             Results from last 7 days   Lab Units 04/05/25  0537   BNP pg/mL 39       Results from last 7 days   Lab Units 04/06/25  0530 04/05/25  0537 04/04/25  0114   LIPASE u/L 402* 820* >12,000*       Results from last 7 days   Lab Units 04/06/25  0910 04/06/25  0909   BLOOD CULTURE  Received in Microbiology Lab. Culture in Progress. Received in Microbiology Lab. Culture in Progress.                   Past Medical History:   Diagnosis Date    Gallstone pancreatitis 04/04/2025    Myositis 11/20/2019     Present on Admission:   Transaminitis   Essential hypertension      Admitting Diagnosis: Cholelithiasis [K80.20]  Pancreatitis, acute [K85.90]  Abdominal pain [R10.9]  Age/Sex: 54 y.o. female    Network Utilization Review Department  ATTENTION: Please call with any questions or concerns to [phone] and carefully listen to the prompts so that you are directed to the right person. All voicemails are confidential.   For Discharge needs, contact Care Management DC Support Team at [phone] opt. 2  Send all requests for admission clinical reviews, approved or denied determinations and any other requests to dedicated fax number below belonging to the campus where the patient is receiving treatment. List of dedicated fax numbers for the Facilities:  FACILITY NAME UR FAX NUMBER   ADMISSION DENIALS (Administrative/Medical Necessity) [phone]   DISCHARGE SUPPORT TEAM (NETWORK) [phone]   PARENT CHILD " [facility] (Maternity/NICU/Pediatrics) [phone]   [facility] [phone]   [facility] [phone]   [facility] [phone]   [facility] [phone]   [facility] [phone]   [facility] [phone]   [facility] [phone]   [facility] [phone]   [facility] [phone]   [facility] [phone]   [facility] [phone]   [facility] [phone]

## 2025-04-06 NOTE — ASSESSMENT & PLAN NOTE
Patient presented with complaints of abdominal pain, nausea, and vomiting  CTAP showing signs of acute uncomplicated pancreatitis as well as cholelithiasis without cholecystitis  General Surgery anticipating lap stephani this admission    Plan:  General Surgery and GI following  Continue IV fluids  Continue pain med regimen

## 2025-04-06 NOTE — PROGRESS NOTES
Progress Note - Surgery-General   Name: Darcie Reyes 54 y.o. female I MRN: [id number]  Unit/Bed#: E2 -01 I Date of Admission: 4/4/2025   Date of Service: 4/6/2025 I Hospital Day: 2     Assessment & Plan  Gallstone pancreatitis  54-year-old female with gallstone pancreatitis without cholecystitis    Continued pain this morning, but improved tenderness.  Tachycardic. On 10 L MFNC this morning.     4/5 MRCP: Acute pancreatitis with few foci of relative hypoenhancement within the pancreatic body/tail, not definitely seen on recent CT, possibly representing early pancreatic necrosis, noting limited evaluation due to motion degradation. No organized peripancreatic fluid collection.  Mid to distal common bile duct wall thickening and enhancement, most pronounced on delayed phase, which may be reactive to the adjacent acute pancreatitis, given similar findings of the distal pancreatic duct. The lack of choledocholithiasis or biliary dilatation argues against cholangitis.    WBC 28.87 (28.34)  Hemoglobin 14.3 (15.1)  Creatinine 2.06 (0.75)  Total bilirubin 0.99 (0.88)    Plan:  Given persistent tachycardia, new O2 requirement and LILIAN, recommend upgrade to ICU for monitoring  New onset LILIAN after receiving lasix yesterday, recommend albumin bolus and re-starting IV fluids for resuscitation   Continue clear liquid diet - not tolerating much for PO 2/2 abdominal pain  Monitor abdominal exam  May require repeat imaging in next 24-48 hours pending clinical progress  Monitor leukocytosis - MRCP without evidence of stones in CBD or cholangitis   Pain control PRN  Appreciate ICU level care for upgrade  LILIAN (acute kidney injury) (HCC)  500 cc albumin bolus given this AM  IVF fluid resuscitation  Repeat creatinine in the AM    Subjective/Objective     Subjective:   Patient seen and examined at bedside, in no acute distress. Feels SOB. Has some improvement in epigastric pain/tenderness. Tolerating minimal PO intake d/t pain. No  "nause or vomiting. Passing flatus, no BM    Objective:   Vitals:Blood pressure 123/77, pulse (!) 132, temperature 98.7 °F (37.1 °C), temperature source Tympanic, resp. rate 22, height 5' 2\" (1.575 m), weight 107 kg (235 lb 10.8 oz), SpO2 94%.  Temp (24hrs), Av.2 °F (36.8 °C), Min:97.7 °F (36.5 °C), Max:98.7 °F (37.1 °C)        Intake/Output Summary (Last 24 hours) at 2025 1034  Last data filed at 2025 2320  Gross per 24 hour   Intake --   Output 375 ml   Net -375 ml       Invasive Devices       Peripheral Intravenous Line  Duration             Peripheral IV 25 Left;Dorsal (posterior) Hand 2 days                    Physical Exam:  General: No acute distress, alert  CV: Well perfused, tachycardic  Lungs: Normal work of breathing, no increased respiratory effort on 10 L MFNC  Abdomen: Soft, epigastric tenderness, non distended  Extremities: No edema, clubbing or cyanosis  Skin: Warm, dry    Lab Results: BMP/CMP:   Lab Results   Component Value Date    SODIUM 133 (L) 2025    K 5.0 2025     2025    CO2 24 2025    BUN 36 (H) 2025    CREATININE 2.06 (H) 2025    CALCIUM 7.7 (L) 2025    AST 51 (H) 2025    ALT 79 (H) 2025    ALKPHOS 95 2025    EGFR 26 2025    and CBC:   Lab Results   Component Value Date    WBC 28.87 (H) 2025    HGB 14.3 2025    HCT 44.5 2025    MCV 96 2025     (L) 2025    RBC 4.63 2025    MCH 30.9 2025    MCHC 32.1 2025    RDW 14.4 2025    MPV 12.1 2025     VTE Prophylaxis: Sequential compression device (Venodyne)  and Enoxaparin (Lovenox)    Denita Shea MD  2025    "

## 2025-04-06 NOTE — PLAN OF CARE
Problem: PAIN - ADULT  Goal: Verbalizes/displays adequate comfort level or baseline comfort level  Description: Interventions:- Encourage patient to monitor pain and request assistance- Assess pain using appropriate pain scale- Administer analgesics based on type and severity of pain and evaluate response- Implement non-pharmacological measures as appropriate and evaluate response- Consider cultural and social influences on pain and pain management- Notify physician/advanced practitioner if interventions unsuccessful or patient reports new pain  Outcome: Progressing     Problem: DISCHARGE PLANNING  Goal: Discharge to home or other facility with appropriate resources  Description: INTERVENTIONS:- Identify barriers to discharge w/patient and caregiver- Arrange for needed discharge resources and transportation as appropriate- Identify discharge learning needs (meds, wound care, etc.)- Arrange for interpretive services to assist at discharge as needed- Refer to Case Management Department for coordinating discharge planning if the patient needs post-hospital services based on physician/advanced practitioner order or complex needs related to functional status, cognitive ability, or social support system  Outcome: Progressing     Problem: Knowledge Deficit  Goal: Patient/family/caregiver demonstrates understanding of disease process, treatment plan, medications, and discharge instructions  Description: Complete learning assessment and assess knowledge base.Interventions:- Provide teaching at level of understanding- Provide teaching via preferred learning methods  Outcome: Progressing     Problem: GASTROINTESTINAL - ADULT  Goal: Minimal or absence of nausea and/or vomiting  Description: INTERVENTIONS:- Administer IV fluids if ordered to ensure adequate hydration- Maintain NPO status until nausea and vomiting are resolved- Nasogastric tube if ordered- Administer ordered antiemetic medications as needed- Provide  nonpharmacologic comfort measures as appropriate- Advance diet as tolerated, if ordered- Consider nutrition services referral to assist patient with adequate nutrition and appropriate food choices  Outcome: Progressing     Problem: RESPIRATORY - ADULT  Goal: Achieves optimal ventilation and oxygenation  Description: INTERVENTIONS:- Assess for changes in respiratory status- Assess for changes in mentation and behavior- Position to facilitate oxygenation and minimize respiratory effort- Oxygen administered by appropriate delivery if ordered- Initiate smoking cessation education as indicated- Encourage broncho-pulmonary hygiene including cough, deep breathe, Incentive Spirometry- Assess the need for suctioning and aspirate as needed- Assess and instruct to report SOB or any respiratory difficulty- Respiratory Therapy support as indicated  Outcome: Progressing     Problem: METABOLIC, FLUID AND ELECTROLYTES - ADULT  Goal: Electrolytes maintained within normal limits  Description: INTERVENTIONS:- Monitor labs and assess patient for signs and symptoms of electrolyte imbalances- Administer electrolyte replacement as ordered- Monitor response to electrolyte replacements, including repeat lab results as appropriate- Instruct patient on fluid and nutrition as appropriate  Outcome: Progressing  Goal: Fluid balance maintained  Description: INTERVENTIONS:- Monitor labs - Monitor I/O and WT- Instruct patient on fluid and nutrition as appropriate- Assess for signs & symptoms of volume excess or deficit  Outcome: Progressing  Goal: Glucose maintained within target range  Description: INTERVENTIONS:- Monitor Blood Glucose as ordered- Assess for signs and symptoms of hyperglycemia and hypoglycemia- Administer ordered medications to maintain glucose within target range- Assess nutritional intake and initiate nutrition service referral as needed  Outcome: Progressing

## 2025-04-06 NOTE — ASSESSMENT & PLAN NOTE
Patient with CT imaging demonstrating hepatic steatosis.  FIB-4 2.34, indeterminate. Patient with multiple metabolic risk factors; Will need outpatient GI follow-up for consideration of ultrasound elastography to further risk stratify and evaluate for advanced fibrosis.  Also recommend follow-up with PCP for management of metabolic risk factors.

## 2025-04-06 NOTE — ASSESSMENT & PLAN NOTE
54-year-old female with gallstone pancreatitis without cholecystitis    Continued pain this morning, but improved tenderness.  Tachycardic. On 10 L MFNC this morning.     4/5 MRCP: Acute pancreatitis with few foci of relative hypoenhancement within the pancreatic body/tail, not definitely seen on recent CT, possibly representing early pancreatic necrosis, noting limited evaluation due to motion degradation. No organized peripancreatic fluid collection.  Mid to distal common bile duct wall thickening and enhancement, most pronounced on delayed phase, which may be reactive to the adjacent acute pancreatitis, given similar findings of the distal pancreatic duct. The lack of choledocholithiasis or biliary dilatation argues against cholangitis.    WBC 28.87 (28.34)  Hemoglobin 14.3 (15.1)  Creatinine 2.06 (0.75)  Total bilirubin 0.99 (0.88)    Plan:  Given persistent tachycardia, new O2 requirement and LILIAN, recommend upgrade to ICU for monitoring  New onset LILIAN after receiving lasix yesterday, recommend albumin bolus and re-starting IV fluids for resuscitation   Continue clear liquid diet - not tolerating much for PO 2/2 abdominal pain  Monitor abdominal exam  May require repeat imaging in next 24-48 hours pending clinical progress  Monitor leukocytosis - MRCP without evidence of stones in CBD or cholangitis   Pain control PRN  Appreciate ICU level care for upgrade

## 2025-04-06 NOTE — PROGRESS NOTES
Progress Note - Gastroenterology   Name: Darcie Reyes 54 y.o. female I MRN: [id number]  Unit/Bed#: E2 -01 I Date of Admission: 4/4/2025   Date of Service: 4/6/2025 I Hospital Day: 2    Assessment & Plan  Gallstone pancreatitis  54-year-old female with past medical history of NELLI, hypertension, obesity, and myositis who presented to ED with back/abdominal pain with nausea/vomiting.  Found to have an elevated lipase level >12k with LFT abnormalities.  CT imaging demonstrating acute interstitial pancreatitis along with cholelithiasis but no evidence of cholecystitis or choledocholithiasis, no biliary dilation.  Patient without prior alcohol, tobacco, or marijuana use.  Medications reviewed.  Triglycerides normal and IgG normal.  Etiology of pancreatitis likely gallstone induced, advise CCY prior to discharge if possible, timing TBD per surgery team.     Ongoing WBC elevation to 28k. Afebrile but remains tachycardic. New onset hypoxia with possible infiltrate on CXR, was given lasix out of concern for volume overload. Procal elevated.  Patient also developed LILIAN with Cr increasing to 2.06, likely prerenal in setting of diuresis and stopping of IVF out of concern for fluid overload. Suspect her respiratory status is related to atelectasis with possible developing pneumonia rather than fluid overload. MRI without evidence of cholangitis. Shows ongoing pancreatitis without discrete collection, prominent but nondilated CBD due to inflammation, and gallstones.  Pain improved from prior per patient and abd exam improved from yesterday.     Plan:   Workup of LILIAN and possible infection per primary team; low threshold to initiate antibiotics   Would restart IVF; monitor respiratory status closely. No pleural effusions or pulmonary edema thus hypoxia less likely related to fluid; suspect LILIAN and tachycardia is related to hypovolemia as above. Would also keep PE on ddx though has alternate sources for hypoxia and  tachycardia at this time, defer to primary   Monitor urine output along with BUN/sCr; maintain UO of >0.5 ml/kg/hour  Tolerating CLD   If worsening abdominal pain or persistent organ failure, consider repeat CT imaging within 72 hours  No need to trend Lipase levels   General Surgery consulted, planned for possible cholecystectomy this admission  PRN pain and anti-emetics; additional symptom management per primary team   Continue DVT ppx   Transaminitis  Improving, AST 51, ALT 79, ALP and TB normal; monitor  Hepatic steatosis  Patient with CT imaging demonstrating hepatic steatosis.  FIB-4 2.34, indeterminate. Patient with multiple metabolic risk factors; Will need outpatient GI follow-up for consideration of ultrasound elastography to further risk stratify and evaluate for advanced fibrosis.  Also recommend follow-up with PCP for management of metabolic risk factors.    SIRS (systemic inflammatory response syndrome) (HCC)  Management per primary     I have discussed the above management plan in detail with the primary service.   Gastroenterology service will follow.    Subjective   Patient evaluated at bedside. Translation services used.  also present. States she is feeling better in terms of her abdominal pain. Breathing is a little harder but she is trying to use incentive spirometer and take deeper breaths. UOP has been lower.     Objective :  Temp:  [97.7 °F (36.5 °C)-98.7 °F (37.1 °C)] 98.7 °F (37.1 °C)  HR:  [125-144] 132  BP: ()/(64-83) 123/77  Resp:  [20-22] 22  SpO2:  [88 %-94 %] 94 %  O2 Device: Mid flow nasal cannula  Nasal Cannula O2 Flow Rate (L/min):  [5 L/min-10 L/min] 10 L/min    Physical Exam  Vitals reviewed.   Constitutional:       Appearance: She is obese.      Comments: Lying in bed supine, in NAD    Cardiovascular:      Rate and Rhythm: Tachycardia present.   Pulmonary:      Effort: No respiratory distress.      Comments: On 10L   Abdominal:      General: Abdomen is protuberant.       Palpations: Abdomen is soft.      Tenderness: There is generalized abdominal tenderness (mild compared to prior). There is no guarding.   Skin:     General: Skin is warm and dry.   Neurological:      Comments: Nods off during conversation            Lab Results: I have reviewed the following results:CBC/BMP:   .     04/06/25  0530   WBC 28.87*   HGB 14.3   HCT 44.5   *   SODIUM 133*   K 5.0      CO2 24   BUN 36*   CREATININE 2.06*   GLUC 132    , Procalcitonin:   Lab Results   Component Value Date    PROCALCITONI 2.74 (H) 04/06/2025       Imaging Results Review: I reviewed radiology reports from this admission including: MRI abdomen/MRCP.

## 2025-04-06 NOTE — CONSULTS
Consultation - Critical Care/ICU   Name: Darcie Reyes 54 y.o. female I MRN: [id number]  Unit/Bed#: ICU 12 I Date of Admission: 4/4/2025   Date of Service: 4/6/2025 I Hospital Day: 2   Consults  Physician Requesting Evaluation: Mattie Rojas*   Reason for Evaluation / Principal Problem: Pancreatitis and Acute hypoxic respiratory failure        Assessment & Plan  Pancreatitis, acute  Patient presented with complaints of abdominal pain, nausea, and vomiting  CTAP showing signs of acute uncomplicated pancreatitis as well as cholelithiasis without cholecystitis  General Surgery anticipating lap stephani this admission    Plan:  General Surgery and GI following  Continue IV fluids  Continue pain med regimen  Acute respiratory insufficiency  Requirement of mid flow oxygen, thought to be concerning for vascular congestion, IV fluids were held and patient was given a dose of Lasix 40 mg  Chest x-ray does not look concerning for prominent pulmonary vasculature  IV fluids resumed on 4/6, given kidney injury and pancreatitis    Plan:  On 2 L nasal cannula, ween oxygen as tolerated  Lab work appears to be improving, abdominal exam and pain is improving  New change in respiratory status is likely all due to atelectasis, seen on chest x-ray  Encouraging OOB, using incentive spirometer and trying Tylenol before narcotic pain medication  SIRS (systemic inflammatory response syndrome) (HCC)  Initially meeting SIRS criteria with tachycardia in the 130s, respiratory rate greater than 20 and leukocytosis    Plan:  Follow-up blood cultures  No clear source of infection at this time, but patient could certainly meet SIRS due to pancreatitis    Transaminitis  LFTs on presentation were AST/ALT/alk phos as 229/185/204  Improving during hospitalization course    Plan:  Monitor CMPs  LILIAN (acute kidney injury) (HCC)  Creatinine 2.06, increased from baseline around 0.7    Plan:  Continue IV fluids,  Monitor daily BMP  Hold home  medication lisinopril and avoid other nephrotoxic meds  Essential hypertension  Home medication 5 mg lisinopril    Plan:  Currently holding in setting of LILIAN  Hepatic steatosis    Class 3 severe obesity due to excess calories without serious comorbidity with body mass index (BMI) of 40.0 to 44.9 in adult (HCC)    Disposition: Stepdown Level 1    History of Present Illness   Darcie Reyes is a 54 y.o. with past medical history of obesity, hypertension, likely sleep apnea, and myositis who presented to the hospital for worsening abdominal pain radiating to her back and nausea/vomiting.  On presentation, patient had a leukocytosis of 16.8, T. bili 0.83, AST/ALT elevated at 229/185, alk phos 204, lipase greater than 12,000 with CT abdomen pelvis with contrast in the ED showing acute uncomplicated pancreatitis and cholelithiasis without cholecystitis. Initially placed on IV fluids, plan for lap stephani this admission and GI consult  Ed. Leukocytosis worsening, otherwise hemoglobin, creatinine, bilirubin stable.    Pancreatitis improving, due to increased work of breathing and hypoxia, IV fluids discontinued, patient placed on mid flow  Due to concern of vascular congestion, given dose of Lasix  No congestion on chest x-ray, fluids restarted for kidney injury and pancreatitis    500cc bolus albumin bolus given this am        History obtained from daughter-in-law.      Historical Information   Past Medical History:  04/04/2025: Gallstone pancreatitis  11/20/2019: Myositis No past surgical history on file.   Current Outpatient Medications   Medication Instructions    benzonatate (TESSALON PERLES) 100 mg, Oral, 3 times daily PRN    Blood Pressure KIT Does not apply, 2 times daily    lisinopril (ZESTRIL) 5 mg, Oral, Daily    No Known Allergies   Social History     Tobacco Use    Smoking status: Never     Passive exposure: Never    Smokeless tobacco: Never   Vaping Use    Vaping status: Never Used   Substance Use Topics     Alcohol use: Never    Drug use: Never    Family History   Problem Relation Age of Onset    No Known Problems Mother     No Known Problems Father     No Known Problems Sister     No Known Problems Sister     No Known Problems Sister     No Known Problems Daughter     No Known Problems Maternal Grandmother     No Known Problems Maternal Grandfather     No Known Problems Paternal Grandmother     No Known Problems Paternal Grandfather     No Known Problems Maternal Aunt     No Known Problems Paternal Aunt     No Known Problems Paternal Aunt           Objective :                   Vitals I/O      Most Recent Min/Max in 24hrs   Temp (!) 101.1 °F (38.4 °C) Temp  Min: 97.7 °F (36.5 °C)  Max: 101.1 °F (38.4 °C)   Pulse (!) 126 Pulse  Min: 125  Max: 144   Resp (!) 24 Resp  Min: 14  Max: 24   /59 BP  Min: 92/64  Max: 141/81   O2 Sat 96 % SpO2  Min: 88 %  Max: 97 %      Intake/Output Summary (Last 24 hours) at 2025 1240  Last data filed at 2025 1131  Gross per 24 hour   Intake 228.33 ml   Output 575 ml   Net -346.67 ml       Diet Clear Liquid    Invasive Monitoring           Physical Exam   Physical Exam  Cardiovascular:      Rate and Rhythm: Regular rhythm. Tachycardia present.   Musculoskeletal:      Right lower le+ Edema present.      Left lower le+ Edema present.   Abdominal: General: There is distension.     Tenderness: There is abdominal tenderness. There is no guarding.   Constitutional:       General: She is not in acute distress.     Appearance: She is ill-appearing.   Pulmonary:      Effort: Tachypnea and respiratory distress present. No accessory muscle usage or accessory muscle usage.      Breath sounds: No wheezing, rhonchi or rales.   Neurological:      Mental Status: She is alert and oriented to person, place and time. She is calm.          Diagnostic Studies        Lab Results: I have reviewed the following results:     Medications:  Scheduled PRN   chlorhexidine, 15 mL, Q12H  [id number]  enoxaparin, 40 mg, Daily  [Held by provider] lisinopril, 5 mg, Daily      aluminum-magnesium hydroxide-simethicone, 30 mL, Q6H PRN  hydrALAZINE, 5 mg, Q6H PRN  HYDROmorphone, 1 mg, Q4H PRN  ondansetron, 4 mg, Q6H PRN  oxyCODONE, 5 mg, Q4H PRN  polyethylene glycol, 17 g, Daily PRN  simethicone, 80 mg, 4x Daily PRN       Continuous    lactated ringers, 125 mL/hr, Last Rate: 125 mL/hr (04/06/25 1131)         Labs:   CBC    Recent Labs     04/05/25 0537 04/06/25  0530   WBC 28.34* 28.87*   HGB 15.1 14.3   HCT 46.9* 44.5    135*   BANDSPCT 6  --      BMP    Recent Labs     04/05/25  0537 04/06/25  0530   SODIUM 138 133*   K 3.8 5.0    102   CO2 24 24   AGAP 6 7   BUN 18 36*   CREATININE 0.75 2.06*   CALCIUM 8.0* 7.7*       Coags    No recent results     Additional Electrolytes  No recent results       Blood Gas    No recent results  No recent results LFTs  Recent Labs     04/05/25  0537 04/06/25  0530   * 79*   AST 73* 51*   ALKPHOS 125* 95   ALB 3.6 3.4*   TBILI 0.88 0.99       Infectious  Recent Labs     04/06/25  0530   PROCALCITONI 2.74*     Glucose  Recent Labs     04/05/25  0537 04/06/25  0530   GLUC 154* 132

## 2025-04-06 NOTE — ASSESSMENT & PLAN NOTE
Initially meeting SIRS criteria with tachycardia in the 130s, respiratory rate greater than 20 and leukocytosis    Plan:  Follow-up blood cultures  No clear source of infection at this time, but patient could certainly meet SIRS due to pancreatitis

## 2025-04-06 NOTE — PROGRESS NOTES
Progress Note - Hospitalist   Name: Darcie Reyes 54 y.o. female I MRN: [id number]  Unit/Bed#: E2 -01 I Date of Admission: 4/4/2025   Date of Service: 4/6/2025 I Hospital Day: 2    Assessment & Plan  Gallstone pancreatitis  History of hypertension obesity hyperlipidemia presented to the hospital for abdominal pain and nausea/vomiting  Symptoms concerning for gallstone pancreatitis  Being followed by gastroenterology and surgery  Currently on CLD   Surgery anticipating cholecystectomy this admission  IV fluids were held last night due to increased work of breathing but will restart given kidney injury    Results from last 7 days   Lab Units 04/06/25  0530 04/05/25  0537 04/04/25  0114   LIPASE u/L 402* 820* >12,000*     Acute pulmonary insufficiency  Likely due to poor respiratory effort/splinting from abdominal pain  IV fluids were stopped yesterday with a dose of IV furosemide.  BNP within limits and CXR without vascular congestion.  Continue oxygen support with mid flow.  IV fluids restarted for kidney injury/pancreatitis    Results from last 7 days   Lab Units 04/05/25  0537   BNP pg/mL 39     SIRS (systemic inflammatory response syndrome) (HCC)  SIRS with leukocytosis tachycardia and hypoxia  Placed on mid flow for oxygenation.    Given persistent tachycardia and leukocytosis with elevated procalcitonin will check blood cultures and lactic acid    Results from last 7 days   Lab Units 04/06/25 0530 04/05/25  0537 04/04/25  0114   WBC Thousand/uL 28.87* 28.34* 16.89*   PROCALCITONIN ng/ml 2.74*  --   --      LILIAN (acute kidney injury) (HCC)  Likely secondary to furosemide use x 1 yesterday and need for further resuscitation  IV fluids restarted with IV albumin x 1  Hold lisinopril    Results from last 7 days   Lab Units 04/06/25  0530 04/05/25  0537 04/04/25  0114   BUN mg/dL 36* 18 14   CREATININE mg/dL 2.06* 0.75 0.84   EGFR ml/min/1.73sq m 26 90 78     Transaminitis  Transaminitis secondary to suspected  gallstone pancreatitis    Results from last 7 days   Lab Units 25  0530 25  0537 25  0114   AST U/L 51* 73* 229*   ALT U/L 79* 139* 185*   TOTAL BILIRUBIN mg/dL 0.99 0.88 0.83     Class 3 severe obesity due to excess calories without serious comorbidity with body mass index (BMI) of 40.0 to 44.9 in adult (HCC)  Body mass index is 43.1 kg/m².  Essential hypertension  Prior to admission on lisinopril.  Will place on hold given kidney injury  Hepatic steatosis      VTE Pharmacologic Prophylaxis: VTE Score: 3 Moderate Risk (Score 3-4) - Pharmacological DVT Prophylaxis Ordered: enoxaparin (Lovenox).  If worsening kidney injury will need to change over to heparin    Mobility:   Basic Mobility Inpatient Raw Score: 24  JH-HLM Goal: 8: Walk 250 feet or more  JH-HLM Achieved: 6: Walk 10 steps or more  JH-HLM Goal NOT achieved. Continue with multidisciplinary rounding and encourage appropriate mobility to improve upon JH-HLM goals.    Patient Centered Rounds: I have performed bedside rounds with nursing staff today.  Discussions with Specialists or Other Care Team Provider: GI, surgery    Education and Discussions with Family / Patient: Updated  () at bedside.    Current Length of Stay: 2 day(s)  Current Patient Status: Inpatient   Certification Statement: The patient will continue to require additional inpatient hospital stay due to kidney injury pancreatitis, pulmonary insufficiency  Discharge Plan: Anticipate discharge in >72 hrs to TBD    Code Status: Level 1 - Full Code    Subjective   Patient seen and examined.  Side.  Still having epigastric pains.  Patient feels she is breathing better but remains on mid flow.    Objective   Vitals:   Temp (24hrs), Av.2 °F (36.8 °C), Min:97.7 °F (36.5 °C), Max:98.7 °F (37.1 °C)    Temp:  [97.7 °F (36.5 °C)-98.7 °F (37.1 °C)] 98.7 °F (37.1 °C)  HR:  [125-144] 132  Resp:  [20-22] 22  BP: ()/(64-83) 123/77  SpO2:  [88 %-94 %] 94 %  Body  mass index is 43.1 kg/m².     Input and Output Summary (last 24 hours):     Intake/Output Summary (Last 24 hours) at 4/6/2025 0829  Last data filed at 4/5/2025 2320  Gross per 24 hour   Intake --   Output 375 ml   Net -375 ml       Physical Exam  Vitals reviewed.   Constitutional:       General: She is not in acute distress.     Appearance: She is obese.   HENT:      Head: Atraumatic.   Eyes:      General: No scleral icterus.  Cardiovascular:      Rate and Rhythm: Regular rhythm. Tachycardia present.      Heart sounds:      No gallop.   Pulmonary:      Effort: No respiratory distress.      Breath sounds: No stridor. Decreased breath sounds present.   Abdominal:      General: Bowel sounds are normal.      Palpations: Abdomen is soft.      Tenderness: There is abdominal tenderness. There is no guarding or rebound.   Musculoskeletal:         General: No deformity.   Skin:     General: Skin is warm and dry.   Neurological:      Mental Status: She is alert. Mental status is at baseline.   Psychiatric:         Mood and Affect: Mood normal.       Lines/Drains:  Invasive Devices       Peripheral Intravenous Line  Duration             Peripheral IV 04/04/25 Left;Dorsal (posterior) Hand 2 days                      Telemetry:  Telemetry Orders (From admission, onward)               24 Hour Telemetry Monitoring  Continuous x 24 Hours (Telem)        Expiring   Question:  Reason for 24 Hour Telemetry  Answer:  PCI/EP study (including pacer and ICD implementation), Cardiac surgery, MI, abnormal cardiac cath, and chest pain- rule out MI                     Telemetry Reviewed: Sinus Tachycardia  Indication for Continued Telemetry Use:                Lab Results: I have reviewed the following results:   Results from last 7 days   Lab Units 04/06/25  0530 04/05/25  0537 04/04/25  0114   WBC Thousand/uL 28.87* 28.34* 16.89*   HEMOGLOBIN g/dL 14.3 15.1 14.8   PLATELETS Thousands/uL 135* 184 223   MCV fL 96 95 93   TOTAL NEUT ABS  Thousand/uL  --  25.79* 10.64*   BANDS PCT %  --  6  --      Results from last 7 days   Lab Units 04/06/25  0530 04/05/25  0537 04/04/25  0114   SODIUM mmol/L 133* 138 139   POTASSIUM mmol/L 5.0 3.8 3.8   CHLORIDE mmol/L 102 108 104   CO2 mmol/L 24 24 25   ANION GAP mmol/L 7 6 10   BUN mg/dL 36* 18 14   CREATININE mg/dL 2.06* 0.75 0.84   CALCIUM mg/dL 7.7* 8.0* 9.7   ALBUMIN g/dL 3.4* 3.6 4.4   TOTAL BILIRUBIN mg/dL 0.99 0.88 0.83   ALK PHOS U/L 95 125* 204*   ALT U/L 79* 139* 185*   AST U/L 51* 73* 229*   EGFR ml/min/1.73sq m 26 90 78   GLUCOSE RANDOM mg/dL 132 154* 167*     Results from last 7 days   Lab Units 04/04/25  0114   MAGNESIUM mg/dL 2.0         Results from last 7 days   Lab Units 04/04/25  0114   HS TNI 0HR ng/L <2          Results from last 7 days   Lab Units 04/06/25  0530   PROCALCITONIN ng/ml 2.74*                   Recent Cultures (last 7 days):         Imaging:  Reviewed radiology reports from this admission including:  XR chest portable  Result Date: 4/6/2025  Impression: Low lung volumes with left greater than right bibasilar opacity, likely atelectasis. Pneumonia not excluded in the appropriate clinical setting. Hyperdense objects in the splenic flexure which could be due to gallstones given history of gallstone pancreatitis. Workstation performed: [id number]     MRI abdomen w wo contrast and mrcp  Result Date: 4/5/2025  Impression: 1.  Acute pancreatitis with few foci of relative hypoenhancement within the pancreatic body/tail, not definitely seen on recent CT, possibly representing early pancreatic necrosis, noting limited evaluation due to motion degradation. No organized peripancreatic fluid collection. 2.  Mid to distal common bile duct wall thickening and enhancement, most pronounced on delayed phase, which may be reactive to the adjacent acute pancreatitis, given similar findings of the distal pancreatic duct. The lack of choledocholithiasis or biliary dilatation argues against  cholangitis. 3.  Small bilateral pleural effusions and bibasilar atelectasis. 4.  Hepatic steatosis. Workstation performed: [id number]     CT abdomen pelvis with contrast  Result Date: 4/4/2025  Impression: Acute uncomplicated pancreatitis. Cholelithiasis Workstation performed: [id number]     Last 24 Hours Medication List:     Current Facility-Administered Medications:     acetaminophen (TYLENOL) tablet 975 mg, Q6H PRN    aluminum-magnesium hydroxide-simethicone (MAALOX) oral suspension 30 mL, Q6H PRN    enoxaparin (LOVENOX) subcutaneous injection 40 mg, Daily    hydrALAZINE (APRESOLINE) injection 5 mg, Q6H PRN    HYDROmorphone (DILAUDID) injection 1 mg, Q4H PRN    iohexol (OMNIPAQUE) 350 MG/ML injection (MULTI-DOSE) 100 mL, Once in imaging    lactated ringers infusion, Continuous    lisinopril (ZESTRIL) tablet 5 mg, Daily    ondansetron (ZOFRAN) injection 4 mg, Q6H PRN    oxyCODONE (ROXICODONE) IR tablet 5 mg, Q4H PRN    polyethylene glycol (MIRALAX) packet 17 g, Daily PRN    simethicone (MYLICON) chewable tablet 80 mg, 4x Daily PRN    Administrative Statements   Today, Patient Was Seen By: Carlo Villeda, DO  I have spent a total time of 35 minutes in caring for this patient on the day of the visit/encounter including Patient and family education, Counseling / Coordination of care, Documenting in the medical record, Reviewing/placing orders in the medical record (including tests, medications, and/or procedures), and Communicating with other healthcare professionals .    **Please Note: This note may have been constructed using a voice recognition system.**

## 2025-04-06 NOTE — ASSESSMENT & PLAN NOTE
Likely secondary to furosemide use x 1 yesterday and need for further resuscitation  IV fluids restarted with IV albumin x 1  Hold lisinopril    Results from last 7 days   Lab Units 04/06/25  0530 04/05/25  0537 04/04/25  0114   BUN mg/dL 36* 18 14   CREATININE mg/dL 2.06* 0.75 0.84   EGFR ml/min/1.73sq m 26 90 78

## 2025-04-06 NOTE — ASSESSMENT & PLAN NOTE
Transaminitis secondary to suspected gallstone pancreatitis    Results from last 7 days   Lab Units 04/06/25  0530 04/05/25  0537 04/04/25  0114   AST U/L 51* 73* 229*   ALT U/L 79* 139* 185*   TOTAL BILIRUBIN mg/dL 0.99 0.88 0.83

## 2025-04-06 NOTE — ASSESSMENT & PLAN NOTE
54-year-old female with past medical history of NELLI, hypertension, obesity, and myositis who presented to ED with back/abdominal pain with nausea/vomiting.  Found to have an elevated lipase level >12k with LFT abnormalities.  CT imaging demonstrating acute interstitial pancreatitis along with cholelithiasis but no evidence of cholecystitis or choledocholithiasis, no biliary dilation.  Patient without prior alcohol, tobacco, or marijuana use.  Medications reviewed.  Triglycerides normal and IgG normal.  Etiology of pancreatitis likely gallstone induced, advise CCY prior to discharge if possible, timing TBD per surgery team.     Ongoing WBC elevation to 28k. Afebrile but remains tachycardic. New onset hypoxia with possible infiltrate on CXR, was given lasix out of concern for volume overload. Procal elevated.  Patient also developed LILIAN with Cr increasing to 2.06, likely prerenal in setting of diuresis and stopping of IVF out of concern for fluid overload. Suspect her respiratory status is related to atelectasis with possible developing pneumonia rather than fluid overload. MRI without evidence of cholangitis. Shows ongoing pancreatitis without discrete collection, prominent but nondilated CBD due to inflammation, and gallstones.  Pain improved from prior per patient and abd exam improved from yesterday.     Plan:   Workup of LILIAN and possible infection per primary team; low threshold to initiate antibiotics   Would restart IVF; monitor respiratory status closely. No pleural effusions or pulmonary edema thus hypoxia less likely related to fluid; suspect LILIAN and tachycardia is related to hypovolemia as above. Would also keep PE on ddx though has alternate sources for hypoxia and tachycardia at this time, defer to primary   Monitor urine output along with BUN/sCr; maintain UO of >0.5 ml/kg/hour  Tolerating CLD   If worsening abdominal pain or persistent organ failure, consider repeat CT imaging within 72 hours  No need  to trend Lipase levels   General Surgery consulted, planned for possible cholecystectomy this admission  PRN pain and anti-emetics; additional symptom management per primary team   Continue DVT ppx

## 2025-04-06 NOTE — ASSESSMENT & PLAN NOTE
LFTs on presentation were AST/ALT/alk phos as 229/185/204  Improving during hospitalization course    Plan:  Monitor CMPs

## 2025-04-07 ENCOUNTER — APPOINTMENT (INPATIENT)
Dept: CT IMAGING | Facility: HOSPITAL | Age: 55
DRG: 444 | End: 2025-04-07
Payer: COMMERCIAL

## 2025-04-07 PROBLEM — D69.6 THROMBOCYTOPENIA (HCC): Status: ACTIVE | Noted: 2025-04-07

## 2025-04-07 LAB
ALBUMIN SERPL BCG-MCNC: 3.1 G/DL (ref 3.5–5)
ALBUMIN SERPL BCG-MCNC: 3.2 G/DL (ref 3.5–5)
ALP SERPL-CCNC: 75 U/L (ref 34–104)
ALP SERPL-CCNC: 80 U/L (ref 34–104)
ALT SERPL W P-5'-P-CCNC: 45 U/L (ref 7–52)
ALT SERPL W P-5'-P-CCNC: 47 U/L (ref 7–52)
ANION GAP SERPL CALCULATED.3IONS-SCNC: 5 MMOL/L (ref 4–13)
AST SERPL W P-5'-P-CCNC: 23 U/L (ref 13–39)
AST SERPL W P-5'-P-CCNC: 24 U/L (ref 13–39)
BILIRUB DIRECT SERPL-MCNC: 0.62 MG/DL (ref 0–0.2)
BILIRUB DIRECT SERPL-MCNC: 0.63 MG/DL (ref 0–0.2)
BILIRUB SERPL-MCNC: 1.22 MG/DL (ref 0.2–1)
BILIRUB SERPL-MCNC: 1.27 MG/DL (ref 0.2–1)
BUN SERPL-MCNC: 17 MG/DL (ref 5–25)
CALCIUM ALBUM COR SERPL-MCNC: 8.5 MG/DL (ref 8.3–10.1)
CALCIUM SERPL-MCNC: 7.9 MG/DL (ref 8.4–10.2)
CHLORIDE SERPL-SCNC: 102 MMOL/L (ref 96–108)
CO2 SERPL-SCNC: 30 MMOL/L (ref 21–32)
CREAT SERPL-MCNC: 0.63 MG/DL (ref 0.6–1.3)
ERYTHROCYTE [DISTWIDTH] IN BLOOD BY AUTOMATED COUNT: 13.9 % (ref 11.6–15.1)
GFR SERPL CREATININE-BSD FRML MDRD: 101 ML/MIN/1.73SQ M
GLUCOSE SERPL-MCNC: 111 MG/DL (ref 65–140)
HCT VFR BLD AUTO: 36.7 % (ref 34.8–46.1)
HGB BLD-MCNC: 11.8 G/DL (ref 11.5–15.4)
LIPASE SERPL-CCNC: 73 U/L (ref 11–82)
MCH RBC QN AUTO: 30.7 PG (ref 26.8–34.3)
MCHC RBC AUTO-ENTMCNC: 32.2 G/DL (ref 31.4–37.4)
MCV RBC AUTO: 96 FL (ref 82–98)
PLATELET # BLD AUTO: 126 THOUSANDS/UL (ref 149–390)
PMV BLD AUTO: 11.6 FL (ref 8.9–12.7)
POTASSIUM SERPL-SCNC: 3.7 MMOL/L (ref 3.5–5.3)
PROCALCITONIN SERPL-MCNC: 1.29 NG/ML
PROT SERPL-MCNC: 6 G/DL (ref 6.4–8.4)
PROT SERPL-MCNC: 6 G/DL (ref 6.4–8.4)
RBC # BLD AUTO: 3.84 MILLION/UL (ref 3.81–5.12)
SODIUM SERPL-SCNC: 137 MMOL/L (ref 135–147)
WBC # BLD AUTO: 18.31 THOUSAND/UL (ref 4.31–10.16)

## 2025-04-07 PROCEDURE — 85027 COMPLETE CBC AUTOMATED: CPT | Performed by: INTERNAL MEDICINE

## 2025-04-07 PROCEDURE — 84145 PROCALCITONIN (PCT): CPT | Performed by: INTERNAL MEDICINE

## 2025-04-07 PROCEDURE — 99232 SBSQ HOSP IP/OBS MODERATE 35: CPT | Performed by: SPECIALIST

## 2025-04-07 PROCEDURE — 82248 BILIRUBIN DIRECT: CPT | Performed by: SURGERY

## 2025-04-07 PROCEDURE — 80053 COMPREHEN METABOLIC PANEL: CPT | Performed by: INTERNAL MEDICINE

## 2025-04-07 PROCEDURE — 83690 ASSAY OF LIPASE: CPT

## 2025-04-07 PROCEDURE — 99233 SBSQ HOSP IP/OBS HIGH 50: CPT | Performed by: INTERNAL MEDICINE

## 2025-04-07 PROCEDURE — 99232 SBSQ HOSP IP/OBS MODERATE 35: CPT | Performed by: INTERNAL MEDICINE

## 2025-04-07 PROCEDURE — 80076 HEPATIC FUNCTION PANEL: CPT | Performed by: PHYSICIAN ASSISTANT

## 2025-04-07 PROCEDURE — 74177 CT ABD & PELVIS W/CONTRAST: CPT

## 2025-04-07 RX ORDER — OXYCODONE HYDROCHLORIDE 5 MG/1
5 TABLET ORAL EVERY 4 HOURS PRN
Refills: 0 | Status: DISCONTINUED | OUTPATIENT
Start: 2025-04-07 | End: 2025-04-10 | Stop reason: HOSPADM

## 2025-04-07 RX ORDER — OXYCODONE HYDROCHLORIDE 10 MG/1
10 TABLET ORAL EVERY 4 HOURS PRN
Refills: 0 | Status: DISCONTINUED | OUTPATIENT
Start: 2025-04-07 | End: 2025-04-10 | Stop reason: HOSPADM

## 2025-04-07 RX ORDER — POTASSIUM CHLORIDE 1500 MG/1
40 TABLET, EXTENDED RELEASE ORAL ONCE
Status: COMPLETED | OUTPATIENT
Start: 2025-04-07 | End: 2025-04-07

## 2025-04-07 RX ADMIN — OXYCODONE 5 MG: 5 TABLET ORAL at 12:53

## 2025-04-07 RX ADMIN — SODIUM CHLORIDE, SODIUM LACTATE, POTASSIUM CHLORIDE, AND CALCIUM CHLORIDE 125 ML/HR: .6; .31; .03; .02 INJECTION, SOLUTION INTRAVENOUS at 06:05

## 2025-04-07 RX ADMIN — SODIUM CHLORIDE, SODIUM LACTATE, POTASSIUM CHLORIDE, AND CALCIUM CHLORIDE 125 ML/HR: .6; .31; .03; .02 INJECTION, SOLUTION INTRAVENOUS at 01:53

## 2025-04-07 RX ADMIN — HYDROMORPHONE HYDROCHLORIDE 1 MG: 1 INJECTION, SOLUTION INTRAMUSCULAR; INTRAVENOUS; SUBCUTANEOUS at 22:19

## 2025-04-07 RX ADMIN — SODIUM CHLORIDE, SODIUM LACTATE, POTASSIUM CHLORIDE, AND CALCIUM CHLORIDE 125 ML/HR: .6; .31; .03; .02 INJECTION, SOLUTION INTRAVENOUS at 14:32

## 2025-04-07 RX ADMIN — OXYCODONE 5 MG: 5 TABLET ORAL at 06:08

## 2025-04-07 RX ADMIN — HYDROMORPHONE HYDROCHLORIDE 1 MG: 1 INJECTION, SOLUTION INTRAMUSCULAR; INTRAVENOUS; SUBCUTANEOUS at 10:06

## 2025-04-07 RX ADMIN — SODIUM CHLORIDE, SODIUM LACTATE, POTASSIUM CHLORIDE, AND CALCIUM CHLORIDE 125 ML/HR: .6; .31; .03; .02 INJECTION, SOLUTION INTRAVENOUS at 22:17

## 2025-04-07 RX ADMIN — ENOXAPARIN SODIUM 40 MG: 40 INJECTION SUBCUTANEOUS at 09:18

## 2025-04-07 RX ADMIN — ACETAMINOPHEN 975 MG: 325 TABLET, FILM COATED ORAL at 10:06

## 2025-04-07 RX ADMIN — CHLORHEXIDINE GLUCONATE 15 ML: 1.2 SOLUTION ORAL at 21:00

## 2025-04-07 RX ADMIN — OXYCODONE 5 MG: 5 TABLET ORAL at 01:11

## 2025-04-07 RX ADMIN — POTASSIUM CHLORIDE 40 MEQ: 1500 TABLET, EXTENDED RELEASE ORAL at 09:18

## 2025-04-07 RX ADMIN — IOHEXOL 100 ML: 350 INJECTION, SOLUTION INTRAVENOUS at 10:57

## 2025-04-07 RX ADMIN — CHLORHEXIDINE GLUCONATE 15 ML: 1.2 SOLUTION ORAL at 09:18

## 2025-04-07 RX ADMIN — HYDROMORPHONE HYDROCHLORIDE 1 MG: 1 INJECTION, SOLUTION INTRAMUSCULAR; INTRAVENOUS; SUBCUTANEOUS at 14:36

## 2025-04-07 RX ADMIN — OXYCODONE HYDROCHLORIDE 10 MG: 10 TABLET ORAL at 17:32

## 2025-04-07 NOTE — PLAN OF CARE
Problem: PAIN - ADULT  Goal: Verbalizes/displays adequate comfort level or baseline comfort level  Description: Interventions:- Encourage patient to monitor pain and request assistance- Assess pain using appropriate pain scale- Administer analgesics based on type and severity of pain and evaluate response- Implement non-pharmacological measures as appropriate and evaluate response- Consider cultural and social influences on pain and pain management- Notify physician/advanced practitioner if interventions unsuccessful or patient reports new pain  Outcome: Progressing     Problem: DISCHARGE PLANNING  Goal: Discharge to home or other facility with appropriate resources  Description: INTERVENTIONS:- Identify barriers to discharge w/patient and caregiver- Arrange for needed discharge resources and transportation as appropriate- Identify discharge learning needs (meds, wound care, etc.)- Arrange for interpretive services to assist at discharge as needed- Refer to Case Management Department for coordinating discharge planning if the patient needs post-hospital services based on physician/advanced practitioner order or complex needs related to functional status, cognitive ability, or social support system  Outcome: Progressing     Problem: Knowledge Deficit  Goal: Patient/family/caregiver demonstrates understanding of disease process, treatment plan, medications, and discharge instructions  Description: Complete learning assessment and assess knowledge base.Interventions:- Provide teaching at level of understanding- Provide teaching via preferred learning methods  Outcome: Progressing     Problem: GASTROINTESTINAL - ADULT  Goal: Minimal or absence of nausea and/or vomiting  Description: INTERVENTIONS:- Administer IV fluids if ordered to ensure adequate hydration- Maintain NPO status until nausea and vomiting are resolved- Nasogastric tube if ordered- Administer ordered antiemetic medications as needed- Provide  nonpharmacologic comfort measures as appropriate- Advance diet as tolerated, if ordered- Consider nutrition services referral to assist patient with adequate nutrition and appropriate food choices  Outcome: Progressing     Problem: RESPIRATORY - ADULT  Goal: Achieves optimal ventilation and oxygenation  Description: INTERVENTIONS:- Assess for changes in respiratory status- Assess for changes in mentation and behavior- Position to facilitate oxygenation and minimize respiratory effort- Oxygen administered by appropriate delivery if ordered- Initiate smoking cessation education as indicated- Encourage broncho-pulmonary hygiene including cough, deep breathe, Incentive Spirometry- Assess the need for suctioning and aspirate as needed- Assess and instruct to report SOB or any respiratory difficulty- Respiratory Therapy support as indicated  Outcome: Progressing     Problem: METABOLIC, FLUID AND ELECTROLYTES - ADULT  Goal: Electrolytes maintained within normal limits  Description: INTERVENTIONS:- Monitor labs and assess patient for signs and symptoms of electrolyte imbalances- Administer electrolyte replacement as ordered- Monitor response to electrolyte replacements, including repeat lab results as appropriate- Instruct patient on fluid and nutrition as appropriate  Outcome: Progressing

## 2025-04-07 NOTE — ASSESSMENT & PLAN NOTE
LFTs on presentation were AST/ALT/alk phos as 229/185/204      Plan:  Monitor CMPs  Resolved   Subjective:      Patient ID: Barb Cuevas is a 79 y.o. male. Patient presents with: Follow-up: hypertension, diabetes, lipids, thyroid - pt is not fasting    He is well and really no c/o on ros  Feet ok no sores  He see's dr Alyssa Cool for the feet regular    He decided to not drop the insulin and is still using most of the time the old dose despite my concerns and instructions to him    YOB: 1937    Date of Visit:  5/7/2019     -- Penicillins -- Hives    Current Outpatient Medications:  quinapril (ACCUPRIL) 40 MG tablet, Take 0.5 tablets by mouth daily, Disp: 90 tablet, Rfl: 2  atorvastatin (LIPITOR) 40 MG tablet, TAKE 1 TABLET BY MOUTH EVERY DAY, Disp: 90 tablet, Rfl: 1  levothyroxine (SYNTHROID) 25 MCG tablet, TAKE 1 TABLET BY MOUTH DAILY, Disp: 30 tablet, Rfl: 3  insulin lispro (HUMALOG KWIKPEN) 100 UNIT/ML pen, Inject 5 Units into the skin 3 times daily (before meals), Disp: 45 mL, Rfl: 5  clopidogrel (PLAVIX) 75 MG tablet, TAKE 1 TABLET BY MOUTH EVERY DAY, Disp: 90 tablet, Rfl: 1  Insulin Pen Needle 31G X 6 MM MISC, 1 each by Does not apply route 3 times daily Test TID DX E11.29, Disp: 100 each, Rfl: 6  amLODIPine (NORVASC) 10 MG tablet, Take 10 mg by mouth daily, Disp: , Rfl:   SOFT TOUCH LANCETS MISC, 1 each by Does not apply route 3 times daily (before meals), Disp: 270 each, Rfl: 3  Blood Glucose Monitoring Suppl (ACCU-CHEK MARTHA) KENJI, 1 each by Does not apply route 3 times daily (before meals), Disp: 1 Device, Rfl: 0  glucose blood VI test strips (ACCU-CHEK MARTHA PLUS) strip, 1 each by In Vitro route 3 times daily As needed. , Disp: 270 each, Rfl: 3  Alcohol Swabs (PHARMACIST CHOICE ALCOHOL) PADS, USE THREE TIMES DAILY, Disp: 300 each, Rfl: PRN  Multiple Vitamins-Minerals (THERAPEUTIC MULTIVITAMIN-MINERALS) tablet, Take 1 tablet by mouth daily. , Disp: , Rfl:   docusate sodium (COLACE) 100 MG capsule, Take 100 mg by mouth 2 times daily as needed. , Disp: , Rfl:   nadolol (CORGARD) 40 MG tablet, Take 40 mg by mouth daily. , Disp: , Rfl:     No current facility-administered medications for this visit.       ----------------------------------                   05/07/19                             0850            ----------------------------------   BP:              130/78            Site:        Left Upper Arm        Position:        Sitting            Cuff Size:      Large Adult          Pulse:             72              Temp:      97.4 °F (36.3 °C)       TempSrc:          Oral             Weight: 172 lb 12.8 oz (78.4 kg)   Height:     5' 5\" (1.651 m)       ----------------------------------  Body mass index is 28.76 kg/m². Wt Readings from Last 3 Encounters:  05/07/19 : 172 lb 12.8 oz (78.4 kg)  01/07/19 : 166 lb 3.2 oz (75.4 kg)  09/07/18 : 164 lb (74.4 kg)    BP Readings from Last 3 Encounters:  05/07/19 : 130/78  01/07/19 : 138/70  09/07/18 : (!) 148/80        Review of Systems    Objective:   Physical Exam   Constitutional: He appears well-developed and well-nourished. No distress. Cardiovascular: Normal rate, regular rhythm and normal heart sounds. Exam reveals no gallop and no friction rub. No murmur heard. Pulmonary/Chest: Effort normal and breath sounds normal. No accessory muscle usage. No tachypnea. No respiratory distress. He has no decreased breath sounds. He has no wheezes. He has no rhonchi. He has no rales. Lymphadenopathy:     He has no cervical adenopathy. Right: No supraclavicular adenopathy present. Left: No supraclavicular adenopathy present. Neurological: He is alert. Skin: Skin is warm, dry and intact. He is not diaphoretic. No pallor. Assessment:       Diagnosis Orders   1. Type 2 diabetes mellitus with chronic kidney disease, with long-term current use of insulin, unspecified CKD stage (HCC)  Glucose    Hemoglobin A1C   2.  Essential hypertension, benign             Plan:      Do be careful of low sugar reactions  See me back in 3 months        Troy Rodgers MD

## 2025-04-07 NOTE — ASSESSMENT & PLAN NOTE
Elevated creatinine up to 2.06 after discontinuing IV fluids.  Likely prerenal.  Fortunately following initiation IV fluids, serum creatinine now improving.

## 2025-04-07 NOTE — PROGRESS NOTES
Progress Note - Critical Care/ICU   Name: Darcie Reyes 54 y.o. female I MRN: [id number]  Unit/Bed#: ICU 12 I Date of Admission: 4/4/2025   Date of Service: 4/7/2025 I Hospital Day: 3    Assessment & Plan  Pancreatitis, acute  Patient presented with complaints of abdominal pain, nausea, and vomiting  CTAP showing signs of acute uncomplicated pancreatitis as well as cholelithiasis without cholecystitis  Pain currently controlled with Oxycodone  MRCP without evidence of stones in CBD or cholangitis     Plan:  General Surgery and GI following  Continue IV fluids 125 ml/hr  Continue pain med regimen(oxycodone 5 mg)  Plan to schedule tylenol and continue with oxycodone   Acute respiratory insufficiency  Requirement of mid flow oxygen, thought to be concerning for vascular congestion, IV fluids were held and patient was given a dose of Lasix 40 mg  Chest x-ray does not look concerning for prominent pulmonary vasculature  IV fluids resumed on 4/6, given kidney injury and pancreatitis    Plan:  On 2 L nasal cannula, ween oxygen as tolerated  Lab work appears to be improving, abdominal exam and pain is improving  New change in respiratory status is likely all due to atelectasis, seen on chest x-ray  Encouraging OOB, using incentive spirometer and trying Tylenol before narcotic pain medication  SIRS (systemic inflammatory response syndrome) (HCC)  Initially meeting SIRS criteria with tachycardia in the 130s, respiratory rate greater than 20 and leukocytosis. Procalc 1.29 from 2.74. Leukocytes trending down     Plan:  Follow-up blood cultures  No clear source of infection at this time, but patient could certainly meet SIRS due to pancreatitis    Transaminitis  LFTs on presentation were AST/ALT/alk phos as 229/185/204      Plan:  Monitor CMPs  Resolved  LILIAN (acute kidney injury) (HCC)    Plan:  Resolved   Essential hypertension  Home medication 5 mg lisinopril    Plan:  Plan to restart with resolution of LILIAN  Hepatic  steatosis    Class 3 severe obesity due to excess calories without serious comorbidity with body mass index (BMI) of 40.0 to 44.9 in adult (HCC)    Thrombocytopenia (HCC)  Secondary to Pancreatitis     Disposition: Medsur with Telemetry    Subjective   Patient seen and examined.  Patient was febrile yesterday and  tachycardic overnight with rates of 130s. Oxycodon added due to poorly control pain with tylenol. This morning, patient reported 10/10 pain her back and diffuse abdominal pain. She denied nausea and vomiting.     Objective :  Temp:  [98.5 °F (36.9 °C)-101.1 °F (38.4 °C)] 98.5 °F (36.9 °C)  HR:  [104-132] 112  BP: ()/(59-85) 141/82  Resp:  [12-27] 16  SpO2:  [89 %-97 %] 94 %  O2 Device: Nasal cannula  Nasal Cannula O2 Flow Rate (L/min):  [2 L/min-10 L/min] 2 L/min    I/O         04/05 0701  04/06 0700 04/06 0701  04/07 0700 04/07 0701  04/08 0700    P.O.  120     I.V. (mL/kg)  790.8 (7.1)     Total Intake(mL/kg)  910.8 (8.2)     Urine (mL/kg/hr) 375 (0.1) 1044 (0.4)     Total Output 375 1044     Net -375 -133.2            Unmeasured Urine Occurrence 1 x            Weights:   IBW (Ideal Body Weight): 50.1 kg    Body mass index is 44.64 kg/m².  Weight (last 2 days)       Date/Time Weight    04/07/25 0600 111 (244.05)    04/06/25 1215 111 (245.15)            Physical Exam  Vitals and nursing note reviewed.   Constitutional:       General: She is not in acute distress.     Appearance: She is well-developed. She is obese.   HENT:      Head: Normocephalic and atraumatic.   Eyes:      Conjunctiva/sclera: Conjunctivae normal.   Cardiovascular:      Rate and Rhythm: Regular rhythm. Tachycardia present.      Heart sounds: No murmur heard.  Pulmonary:      Effort: Pulmonary effort is normal. No respiratory distress.      Breath sounds: Normal breath sounds. Decreased air movement present.   Abdominal:      General: Abdomen is flat.      Palpations: Abdomen is soft.      Tenderness: There is abdominal tenderness  in the right upper quadrant, right lower quadrant, left upper quadrant and left lower quadrant.   Musculoskeletal:         General: No swelling.      Cervical back: Neck supple.   Skin:     General: Skin is warm and dry.      Capillary Refill: Capillary refill takes less than 2 seconds.   Neurological:      Mental Status: She is alert.   Psychiatric:         Mood and Affect: Mood normal.           Lab Results: I have reviewed the following results:  Recent Labs     04/05/25  0537 04/06/25  0530 04/06/25  0911 04/07/25  0630   WBC 28.34*   < >  --  18.31*   HGB 15.1   < >  --  11.8   HCT 46.9*   < >  --  36.7      < >  --  126*   BANDSPCT 6  --   --   --    SODIUM 138   < >  --  137   K 3.8   < >  --  3.7      < >  --  102   CO2 24   < >  --  30   BUN 18   < >  --  17   CREATININE 0.75   < >  --  0.63   GLUC 154*   < >  --  111   AST 73*   < >  --  24  23   *   < >  --  47  45   ALB 3.6   < >  --  3.1*  3.2*   TBILI 0.88   < >  --  1.27*  1.22*   ALKPHOS 125*   < >  --  80  75   BNP 39  --   --   --    LACTICACID  --   --  1.0  --     < > = values in this interval not displayed.       Imaging Results Review: I reviewed radiology reports from this admission including: chest xray.  Other Study Results Review: EKG was reviewed.     Currently Ordered Meds:   Current Facility-Administered Medications:     acetaminophen (TYLENOL) tablet 975 mg, Q8H PRN    aluminum-magnesium hydroxide-simethicone (MAALOX) oral suspension 30 mL, Q6H PRN    chlorhexidine (PERIDEX) 0.12 % oral rinse 15 mL, Q12H GRZEGORZ    enoxaparin (LOVENOX) subcutaneous injection 40 mg, Daily    HYDROmorphone (DILAUDID) injection 1 mg, Q4H PRN    lactated ringers infusion, Continuous, Last Rate: 125 mL/hr (04/07/25 0605)    [Held by provider] lisinopril (ZESTRIL) tablet 5 mg, Daily    ondansetron (ZOFRAN) injection 4 mg, Q6H PRN    oxyCODONE (ROXICODONE) IR tablet 5 mg, Q4H PRN    polyethylene glycol (MIRALAX) packet 17 g, Daily PRN     potassium chloride (Klor-Con M20) CR tablet 40 mEq, Once    simethicone (MYLICON) chewable tablet 80 mg, 4x Daily PRN  VTE Pharmacologic Prophylaxis: Heparin  VTE Mechanical Prophylaxis: sequential compression device    Administrative Statements     Portions of the record may have been created with voice recognition software.

## 2025-04-07 NOTE — PROGRESS NOTES
Progress Note - Gastroenterology   Name: Darcie Reyes 54 y.o. female I MRN: [id number]  Unit/Bed#: ICU 12 I Date of Admission: 4/4/2025   Date of Service: 4/7/2025 I Hospital Day: 3     Assessment & Plan  Pancreatitis, acute  54-year-old female with past medical history of NELLI, hypertension, obesity, and myositis who presented to ED with back/abdominal pain with nausea/vomiting.  Found to have an elevated lipase level >12k with LFT abnormalities.  CT imaging demonstrating acute interstitial pancreatitis along with cholelithiasis but no evidence of cholecystitis or choledocholithiasis, no biliary dilation.  Patient without prior alcohol, tobacco, or marijuana use.  Medications reviewed.  Triglycerides normal and IgG normal.  Etiology of pancreatitis likely gallstone induced, advise CCY prior to discharge if possible, timing TBD per surgery team.     Over the weekend, patient with ongoing WBC elevation to 28k.  She also developed tachycardia with new onset hypoxia and was transferred to the ICU.  IV fluids initially discontinued due to concern for volume overload.  Labs also demonstrating LILIAN with serum creatinine elevation to 2.06, new onset hypoxia with possible infiltrate on CXR. Patient given lasix out of concern for volume overload. Procal elevated.  Patient also developed LILIAN with Cr increasing to 2.06, likely prerenal in setting of diuresis and stopping of IVF out of concern for fluid overload. Suspect her respiratory status is related to atelectasis with possible developing pneumonia rather than fluid overload. MRI without evidence of cholangitis but with ongoing pancreatitis without discrete collection, prominent but nondilated CBD due to inflammation, and gallstones.     Today, patient with ongoing pain and poor PO intake. CT imaging completed this morning to further evaluate and patient with evidence of pancreatic necrosis and a developing peripancreatic fluid collection adjacent to the stomach measuring  6.4 cm. Question of possible thrombosis in the main portal vein and SMW. Unfortunately, collections not amendable to drainage at this time but recommend ongoing conservative, symptomatic as below.     Plan:   Aggressive IVF hydration; continue LR @ 125 cc/hr  Monitor urine output along with BUN/sCr; maintain UO of >0.5 ml/kg/hour  Current diet NPO; advance diet slowly as patient tolerates  Consider enteral feeding with NG/NJ tube placement in 72 hours if patient unable to tolerate PO  Continue to monitor abdominal examination, low threshold for repeat CT imaging  Monitor off antibiotics; monitor WBC and trend fever curve - infectious workup if patient becomes febrile  No indication to trend Lipase levels   Continue on Lovenox for DVT ppx  General surgery consulted for consideration of CCY this admission  PRN pain and anti-emetics; additional symptom management per primary team   Transaminitis  Patient with elevated serum transaminases on arrival.  LFTs continue to improve.  Hepatic steatosis  Patient with CT imaging demonstrating hepatic steatosis.  FIB-4 2.34, indeterminate. Patient with multiple metabolic risk factors; Will need outpatient GI follow-up for consideration of ultrasound elastography to further risk stratify and evaluate for advanced fibrosis.  Also recommend follow-up with PCP for management of metabolic risk factors.  LILIAN (acute kidney injury) (HCC)  Elevated creatinine up to 2.06 after discontinuing IV fluids.  Likely prerenal.  Fortunately following initiation IV fluids, serum creatinine now improving.  SIRS (systemic inflammatory response syndrome) (HCC)  Management per primary       Subjective  Patient seen and evaluated bedside.  Sitting in bed comfortably in no acute distress or visible discomfort.  Admits to ongoing abdominal pain.  Not tolerating oral intake.  No fever/chills.  Offers no additional complaints.  Peers to be more comfortable today.    Objective :  Temp:  [98.5 °F (36.9  °C)-99.1 °F (37.3 °C)] 98.9 °F (37.2 °C)  HR:  [102-132] 102  BP: (106-153)/(67-85) 133/74  Resp:  [12-27] 14  SpO2:  [85 %-96 %] 91 %  O2 Device: None (Room air)  Nasal Cannula O2 Flow Rate (L/min):  [2 L/min] 2 L/min    Physical Exam  Constitutional:       General: She is not in acute distress.     Appearance: She is obese. She is not ill-appearing or toxic-appearing.   HENT:      Head: Normocephalic.      Nose: Nose normal. No congestion.   Eyes:      General: No scleral icterus.  Cardiovascular:      Rate and Rhythm: Normal rate.      Pulses: Normal pulses.   Pulmonary:      Effort: Pulmonary effort is normal. No respiratory distress.   Abdominal:      General: Abdomen is flat. Bowel sounds are normal. There is no distension.      Palpations: Abdomen is soft.      Tenderness: There is abdominal tenderness in the epigastric area. There is no guarding or rebound.   Musculoskeletal:      Cervical back: Normal range of motion.   Skin:     General: Skin is warm.      Capillary Refill: Capillary refill takes less than 2 seconds.      Coloration: Skin is not pale.   Neurological:      Mental Status: She is alert and oriented to person, place, and time. Mental status is at baseline.   Psychiatric:         Mood and Affect: Mood normal.         Behavior: Behavior normal.         Lab Results: I have reviewed the following results:CBC/BMP:   .     04/07/25  0630   WBC 18.31*   HGB 11.8   HCT 36.7   *   SODIUM 137   K 3.7      CO2 30   BUN 17   CREATININE 0.63   GLUC 111    , Creatinine Clearance: Estimated Creatinine Clearance: 118.7 mL/min (by C-G formula based on SCr of 0.63 mg/dL)., LFTs:   .     04/07/25  0630   AST 24  23   ALT 47  45   ALB 3.1*  3.2*   TBILI 1.27*  1.22*   ALKPHOS 80  75    , PTT/INR:No new results in last 24 hours. , Troponin,BNP:No new results in last 24 hours.     Imaging Results Review: I reviewed radiology reports from this admission including: CT abdomen/pelvis.  Other Study  Results Review: No additional pertinent studies reviewed.

## 2025-04-07 NOTE — QUICK NOTE
Patient seen at bedside.  No acute distress.    States that she has no pain at this time, though endorses pain earlier today.  She states that her pain was in her lower abdomen earlier today and has not had upper abdominal pain.    On exam she is minimally tender of the bilateral lower quadrants, nondistended.  Overall relatively reassuring abdominal exam.    Will continue to monitor closely given worsening CT scan.  Noted peripancreatic collection.  No gas, most consistent with walled off pancreatic necrosis without evidence of infection.    Would hold off on antibiotics, discussed with ICU team previously.  Continue close monitoring.    Strongly prefer to avoid operating for pancreatitis, in favor of the step up approach.  May ultimately require drainage for future collections, may ultimately get sicker before she gets better.    Once improved from this bout of pancreatitis will discuss timing of cholecystectomy, inpatient versus outpatient, to due to risk of further recurrence.

## 2025-04-07 NOTE — ASSESSMENT & PLAN NOTE
54-year-old female with gallstone pancreatitis without cholecystitis    Improved pain this morning.  On 2 L per nasal cannula.  Tachycardia also improved.  Febrile yesterday at 11 AM to 101.1.    Morning labs pending  Plan:  Clear liquid diet as tolerated  Follow-up morning labs, evaluate creatinine and leukocytosis  Monitor abdominal exam-improved this morning  May require repeat imaging in next 24-48 hours pending clinical progress  Monitor leukocytosis - MRCP without evidence of stones in CBD or cholangitis   Pain control PRN  Appreciate ICU level care for upgrade

## 2025-04-07 NOTE — PROGRESS NOTES
"Progress Note - Surgery-General   Name: Darcie Reyes 54 y.o. female I MRN: [id number]  Unit/Bed#: ICU 12 I Date of Admission: 2025   Date of Service: 2025 I Hospital Day: 3     Assessment & Plan  Pancreatitis, acute  54-year-old female with gallstone pancreatitis without cholecystitis    Improved pain this morning.  On 2 L per nasal cannula.  Tachycardia also improved.  Febrile yesterday at 11 AM to 101.1.    Morning labs pending  Plan:  Clear liquid diet as tolerated  Follow-up morning labs, evaluate creatinine and leukocytosis  Monitor abdominal exam-improved this morning  May require repeat imaging in next 24-48 hours pending clinical progress  Monitor leukocytosis - MRCP without evidence of stones in CBD or cholangitis   Pain control PRN  Appreciate ICU level care for upgrade  LILIAN (acute kidney injury) (HCC)  500 cc albumin bolus given /  IVF fluid resuscitation  Repeat creatinine in the AM    Subjective/Objective     Subjective:   Patient seen and examined at bedside, in no acute distress.  Improved shortness of breath this morning.  Improved tenderness.  At this time, complaining of left-sided back pain.    Objective:   Vitals:Blood pressure 106/77, pulse 104, temperature 98.5 °F (36.9 °C), temperature source Oral, resp. rate 13, height 5' 2\" (1.575 m), weight 111 kg (245 lb 2.4 oz), SpO2 95%.  Temp (24hrs), Av.6 °F (37.6 °C), Min:98.5 °F (36.9 °C), Max:101.1 °F (38.4 °C)        Intake/Output Summary (Last 24 hours) at 2025 0617  Last data filed at 2025 2311  Gross per 24 hour   Intake 910.83 ml   Output 844 ml   Net 66.83 ml       Invasive Devices       Peripheral Intravenous Line  Duration             Peripheral IV 25 Left;Dorsal (posterior) Hand 3 days    Peripheral IV 25 Left;Ventral (anterior) Forearm <1 day                    Physical Exam:  General: No acute distress, alert  CV: Well perfused, tachycardic  Lungs: Normal work of breathing, no increased respiratory " effort on 10 L MFNC  Abdomen: Soft, very mild epigastric tenderness, non distended  Extremities: No edema, clubbing or cyanosis  Skin: Warm, dry    Lab Results: Pending    VTE Prophylaxis: Sequential compression device (Venodyne)  and Enoxaparin (Lovenox)    Denita Shea MD  4/7/2025

## 2025-04-07 NOTE — ASSESSMENT & PLAN NOTE
Patient presented with complaints of abdominal pain, nausea, and vomiting  CTAP showing signs of acute uncomplicated pancreatitis as well as cholelithiasis without cholecystitis  Pain currently controlled with Oxycodone  MRCP without evidence of stones in CBD or cholangitis     Plan:  General Surgery and GI following  Continue IV fluids 125 ml/hr  Continue pain med regimen(oxycodone 5 mg)  Plan to schedule tylenol and continue with oxycodone

## 2025-04-07 NOTE — ASSESSMENT & PLAN NOTE
Initially meeting SIRS criteria with tachycardia in the 130s, respiratory rate greater than 20 and leukocytosis. Procalc 1.29 from 2.74. Leukocytes trending down     Plan:  Follow-up blood cultures  No clear source of infection at this time, but patient could certainly meet SIRS due to pancreatitis

## 2025-04-07 NOTE — ASSESSMENT & PLAN NOTE
54-year-old female with past medical history of NELLI, hypertension, obesity, and myositis who presented to ED with back/abdominal pain with nausea/vomiting.  Found to have an elevated lipase level >12k with LFT abnormalities.  CT imaging demonstrating acute interstitial pancreatitis along with cholelithiasis but no evidence of cholecystitis or choledocholithiasis, no biliary dilation.  Patient without prior alcohol, tobacco, or marijuana use.  Medications reviewed.  Triglycerides normal and IgG normal.  Etiology of pancreatitis likely gallstone induced, advise CCY prior to discharge if possible, timing TBD per surgery team.     Over the weekend, patient with ongoing WBC elevation to 28k.  She also developed tachycardia with new onset hypoxia and was transferred to the ICU.  IV fluids initially discontinued due to concern for volume overload.  Labs also demonstrating LILIAN with serum creatinine elevation to 2.06, new onset hypoxia with possible infiltrate on CXR. Patient given lasix out of concern for volume overload. Procal elevated.  Patient also developed LILIAN with Cr increasing to 2.06, likely prerenal in setting of diuresis and stopping of IVF out of concern for fluid overload. Suspect her respiratory status is related to atelectasis with possible developing pneumonia rather than fluid overload. MRI without evidence of cholangitis but with ongoing pancreatitis without discrete collection, prominent but nondilated CBD due to inflammation, and gallstones.     Today, patient with ongoing pain and poor PO intake. CT imaging completed this morning to further evaluate and patient with evidence of pancreatic necrosis and a developing peripancreatic fluid collection adjacent to the stomach measuring 6.4 cm. Question of possible thrombosis in the main portal vein and SMW. Unfortunately, collections not amendable to drainage at this time but recommend ongoing conservative, symptomatic as below.     Plan:   Aggressive IVF  hydration; continue LR @ 125 cc/hr  Monitor urine output along with BUN/sCr; maintain UO of >0.5 ml/kg/hour  Current diet NPO; advance diet slowly as patient tolerates  Consider enteral feeding with NG/NJ tube placement in 72 hours if patient unable to tolerate PO  Continue to monitor abdominal examination, low threshold for repeat CT imaging  Monitor off antibiotics; monitor WBC and trend fever curve - infectious workup if patient becomes febrile  No indication to trend Lipase levels   Continue on Lovenox for DVT ppx  General surgery consulted for consideration of CCY this admission  PRN pain and anti-emetics; additional symptom management per primary team

## 2025-04-08 LAB
ALBUMIN SERPL BCG-MCNC: 2.9 G/DL (ref 3.5–5)
ALP SERPL-CCNC: 86 U/L (ref 34–104)
ALT SERPL W P-5'-P-CCNC: 34 U/L (ref 7–52)
ANION GAP SERPL CALCULATED.3IONS-SCNC: 5 MMOL/L (ref 4–13)
ANISOCYTOSIS BLD QL SMEAR: PRESENT
AST SERPL W P-5'-P-CCNC: 18 U/L (ref 13–39)
BASOPHILS # BLD MANUAL: 0.18 THOUSAND/UL (ref 0–0.1)
BASOPHILS NFR MAR MANUAL: 1 % (ref 0–1)
BILIRUB DIRECT SERPL-MCNC: 0.38 MG/DL (ref 0–0.2)
BILIRUB SERPL-MCNC: 0.9 MG/DL (ref 0.2–1)
BUN SERPL-MCNC: 9 MG/DL (ref 5–25)
CALCIUM SERPL-MCNC: 8 MG/DL (ref 8.4–10.2)
CHLORIDE SERPL-SCNC: 101 MMOL/L (ref 96–108)
CO2 SERPL-SCNC: 31 MMOL/L (ref 21–32)
CREAT SERPL-MCNC: 0.55 MG/DL (ref 0.6–1.3)
EOSINOPHIL # BLD MANUAL: 0 THOUSAND/UL (ref 0–0.4)
EOSINOPHIL NFR BLD MANUAL: 0 % (ref 0–6)
ERYTHROCYTE [DISTWIDTH] IN BLOOD BY AUTOMATED COUNT: 13.9 % (ref 11.6–15.1)
GFR SERPL CREATININE-BSD FRML MDRD: 105 ML/MIN/1.73SQ M
GLUCOSE SERPL-MCNC: 108 MG/DL (ref 65–140)
HCT VFR BLD AUTO: 35 % (ref 34.8–46.1)
HGB BLD-MCNC: 11.4 G/DL (ref 11.5–15.4)
LYMPHOCYTES # BLD AUTO: 1.61 THOUSAND/UL (ref 0.6–4.47)
LYMPHOCYTES # BLD AUTO: 9 % (ref 14–44)
MAGNESIUM SERPL-MCNC: 2 MG/DL (ref 1.9–2.7)
MCH RBC QN AUTO: 31.1 PG (ref 26.8–34.3)
MCHC RBC AUTO-ENTMCNC: 32.6 G/DL (ref 31.4–37.4)
MCV RBC AUTO: 95 FL (ref 82–98)
MONOCYTES # BLD AUTO: 0.71 THOUSAND/UL (ref 0–1.22)
MONOCYTES NFR BLD: 4 % (ref 4–12)
NEUTROPHILS # BLD MANUAL: 15.36 THOUSAND/UL (ref 1.85–7.62)
NEUTS BAND NFR BLD MANUAL: 4 % (ref 0–8)
NEUTS SEG NFR BLD AUTO: 82 % (ref 43–75)
PHOSPHATE SERPL-MCNC: 1.4 MG/DL (ref 2.7–4.5)
PHOSPHATE SERPL-MCNC: 2.3 MG/DL (ref 2.7–4.5)
PLATELET # BLD AUTO: 151 THOUSANDS/UL (ref 149–390)
PLATELET BLD QL SMEAR: ADEQUATE
PMV BLD AUTO: 12 FL (ref 8.9–12.7)
POTASSIUM SERPL-SCNC: 3.7 MMOL/L (ref 3.5–5.3)
PROT SERPL-MCNC: 5.9 G/DL (ref 6.4–8.4)
RBC # BLD AUTO: 3.67 MILLION/UL (ref 3.81–5.12)
RBC MORPH BLD: PRESENT
SODIUM SERPL-SCNC: 137 MMOL/L (ref 135–147)
WBC # BLD AUTO: 17.86 THOUSAND/UL (ref 4.31–10.16)

## 2025-04-08 PROCEDURE — 85027 COMPLETE CBC AUTOMATED: CPT

## 2025-04-08 PROCEDURE — 84100 ASSAY OF PHOSPHORUS: CPT

## 2025-04-08 PROCEDURE — 80076 HEPATIC FUNCTION PANEL: CPT | Performed by: PHYSICIAN ASSISTANT

## 2025-04-08 PROCEDURE — 99232 SBSQ HOSP IP/OBS MODERATE 35: CPT | Performed by: SPECIALIST

## 2025-04-08 PROCEDURE — NC001 PR NO CHARGE: Performed by: INTERNAL MEDICINE

## 2025-04-08 PROCEDURE — 85007 BL SMEAR W/DIFF WBC COUNT: CPT

## 2025-04-08 PROCEDURE — 99232 SBSQ HOSP IP/OBS MODERATE 35: CPT | Performed by: INTERNAL MEDICINE

## 2025-04-08 PROCEDURE — 80048 BASIC METABOLIC PNL TOTAL CA: CPT

## 2025-04-08 PROCEDURE — 83735 ASSAY OF MAGNESIUM: CPT

## 2025-04-08 RX ORDER — POLYETHYLENE GLYCOL 3350 17 G/17G
17 POWDER, FOR SOLUTION ORAL DAILY
Status: DISCONTINUED | OUTPATIENT
Start: 2025-04-08 | End: 2025-04-10 | Stop reason: HOSPADM

## 2025-04-08 RX ORDER — DEXTROSE MONOHYDRATE, SODIUM CHLORIDE, AND POTASSIUM CHLORIDE 50; 1.49; 4.5 G/1000ML; G/1000ML; G/1000ML
84 INJECTION, SOLUTION INTRAVENOUS CONTINUOUS
Status: DISCONTINUED | OUTPATIENT
Start: 2025-04-08 | End: 2025-04-09

## 2025-04-08 RX ORDER — AMOXICILLIN 250 MG
1 CAPSULE ORAL 2 TIMES DAILY
Status: DISCONTINUED | OUTPATIENT
Start: 2025-04-08 | End: 2025-04-10 | Stop reason: HOSPADM

## 2025-04-08 RX ADMIN — ENOXAPARIN SODIUM 40 MG: 40 INJECTION SUBCUTANEOUS at 09:00

## 2025-04-08 RX ADMIN — SODIUM CHLORIDE, SODIUM LACTATE, POTASSIUM CHLORIDE, AND CALCIUM CHLORIDE 125 ML/HR: .6; .31; .03; .02 INJECTION, SOLUTION INTRAVENOUS at 08:28

## 2025-04-08 RX ADMIN — LISINOPRIL 5 MG: 5 TABLET ORAL at 10:03

## 2025-04-08 RX ADMIN — ACETAMINOPHEN 975 MG: 325 TABLET, FILM COATED ORAL at 23:53

## 2025-04-08 RX ADMIN — OXYCODONE HYDROCHLORIDE 10 MG: 10 TABLET ORAL at 14:53

## 2025-04-08 RX ADMIN — DEXTROSE, SODIUM CHLORIDE, AND POTASSIUM CHLORIDE 84 ML/HR: 5; .45; .15 INJECTION INTRAVENOUS at 22:52

## 2025-04-08 RX ADMIN — OXYCODONE 5 MG: 5 TABLET ORAL at 19:44

## 2025-04-08 RX ADMIN — POTASSIUM PHOSPHATE, MONOBASIC POTASSIUM PHOSPHATE, DIBASIC 30 MMOL: 224; 236 INJECTION, SOLUTION, CONCENTRATE INTRAVENOUS at 09:00

## 2025-04-08 RX ADMIN — DEXTROSE, SODIUM CHLORIDE, AND POTASSIUM CHLORIDE 84 ML/HR: 5; .45; .15 INJECTION INTRAVENOUS at 12:31

## 2025-04-08 RX ADMIN — OXYCODONE 5 MG: 5 TABLET ORAL at 01:21

## 2025-04-08 RX ADMIN — POTASSIUM PHOSPHATE, MONOBASIC POTASSIUM PHOSPHATE, DIBASIC 30 MMOL: 224; 236 INJECTION, SOLUTION, CONCENTRATE INTRAVENOUS at 17:35

## 2025-04-08 RX ADMIN — CHLORHEXIDINE GLUCONATE 15 ML: 1.2 SOLUTION ORAL at 09:00

## 2025-04-08 RX ADMIN — OXYCODONE HYDROCHLORIDE 10 MG: 10 TABLET ORAL at 04:48

## 2025-04-08 RX ADMIN — POLYETHYLENE GLYCOL 3350 17 G: 17 POWDER, FOR SOLUTION ORAL at 10:03

## 2025-04-08 RX ADMIN — SENNOSIDES AND DOCUSATE SODIUM 1 TABLET: 50; 8.6 TABLET ORAL at 17:08

## 2025-04-08 RX ADMIN — SENNOSIDES AND DOCUSATE SODIUM 1 TABLET: 50; 8.6 TABLET ORAL at 10:03

## 2025-04-08 NOTE — PROGRESS NOTES
Progress Note - Gastroenterology   Name: Darcie Reyes 54 y.o. female I MRN: [id number]  Unit/Bed#: ICU 09 I Date of Admission: 4/4/2025   Date of Service: 4/8/2025 I Hospital Day: 4     Assessment & Plan  Pancreatitis, acute  54-year-old female with past medical history of NELLI, hypertension, obesity, and myositis who presented to ED with back/abdominal pain with nausea/vomiting.  Found to have an elevated lipase level >12k with LFT abnormalities.  CT imaging demonstrating acute interstitial pancreatitis along with cholelithiasis but no evidence of cholecystitis or choledocholithiasis, no biliary dilation.  Patient without prior alcohol, tobacco, or marijuana use.  Medications reviewed.  Triglycerides normal and IgG normal.  Etiology of pancreatitis likely gallstone induced, advise CCY prior to discharge if possible, timing TBD per surgery team.     Over the weekend, patient with ongoing WBC elevation to 28k.  She also developed tachycardia with new onset hypoxia and was transferred to the ICU.  IV fluids initially discontinued due to concern for volume overload.  Labs also demonstrating LILIAN with serum creatinine elevation to 2.06, new onset hypoxia with possible infiltrate on CXR. Patient given lasix out of concern for volume overload. Procal elevated.  Patient also developed LILIAN with Cr increasing to 2.06, likely prerenal in setting of diuresis and stopping of IVF out of concern for fluid overload. Suspect her respiratory status is related to atelectasis with possible developing pneumonia rather than fluid overload. MRI without evidence of cholangitis but with ongoing pancreatitis without discrete collection, prominent but nondilated CBD due to inflammation, and gallstones. Repeat CT imaging completed 4/7 to further evaluate ongoing pain and with evidence of pancreatic necrosis and a developing peripancreatic fluid collection adjacent to the stomach measuring 6.4 cm. Question of possible thrombosis in the main  portal vein and SMW. Collections not amenable to drainage currently.     Today, patient's pain improved. Ambulating with nursing. Advanced to clear liquid diet with toast/crackers. However, patient with worsening pain with PO intake. Will continue to trial food while monitoring pain. If patient unable to tolerate but mouth in the next 24 to 48 hours, may need to consider NJ placement. Otherwise, recommend ongoing conservative, symptomatic as below.     Plan:   Aggressive IVF hydration; continue LR @ 125 cc/hr  Monitor urine output along with BUN/sCr; maintain UO of >0.5 ml/kg/hour  Current diet: clear liquid with toast/crackers, advance diet slowly as patient tolerates  Consider enteral feeding with NG/NJ tube placement in 48 hours if patient unable to tolerate PO  Continue to monitor abdominal examination, low threshold for repeat CT imaging  Monitor off antibiotics; monitor WBC and trend fever curve - infectious workup if patient becomes febrile  No indication to trend Lipase levels   Continue on Lovenox for DVT ppx  General surgery consulted for consideration of CCY this admission  PRN pain and anti-emetics; additional symptom management per primary team   Transaminitis  Patient with elevated serum transaminases on arrival.  LFTs have now normalized.   Hepatic steatosis  Patient with CT imaging demonstrating hepatic steatosis.  FIB-4 2.34, indeterminate. Patient with multiple metabolic risk factors; Will need outpatient GI follow-up for consideration of ultrasound elastography to further risk stratify and evaluate for advanced fibrosis.  Also recommend follow-up with PCP for management of metabolic risk factors.  LILIAN (acute kidney injury) (HCC)  Elevated creatinine up to 2.06 after discontinuing IV fluids.  Likely prerenal.  Fortunately following initiation IV fluids, serum creatinine now improving.  SIRS (systemic inflammatory response syndrome) (HCC)  Management per primary     Subjective  Patient seen and  examined at bedside. Ambulating in halls today. Pain improved but then worsened with PO intake. No nausea/vomiting. No fever/chills.     Objective :  Temp:  [97.8 °F (36.6 °C)-100 °F (37.8 °C)] 100 °F (37.8 °C)  HR:  [104-119] 104  BP: (124-167)/(63-82) 142/63  Resp:  [12-32] 24  SpO2:  [90 %-96 %] 95 %  O2 Device: Nasal cannula  Nasal Cannula O2 Flow Rate (L/min):  [2 L/min-3 L/min] 2 L/min    Physical Exam  Constitutional:       General: She is not in acute distress.     Appearance: She is obese. She is not ill-appearing or toxic-appearing.   HENT:      Head: Normocephalic.      Nose: Nose normal. No congestion.   Eyes:      General: No scleral icterus.  Cardiovascular:      Rate and Rhythm: Normal rate.      Pulses: Normal pulses.   Pulmonary:      Effort: Pulmonary effort is normal. No respiratory distress.   Abdominal:      General: Abdomen is flat. There is no distension.      Palpations: Abdomen is soft.      Tenderness: There is abdominal tenderness in the epigastric area. There is no guarding or rebound.   Musculoskeletal:      Cervical back: Normal range of motion.   Skin:     General: Skin is warm.      Capillary Refill: Capillary refill takes less than 2 seconds.      Coloration: Skin is not pale.   Neurological:      Mental Status: She is alert and oriented to person, place, and time. Mental status is at baseline.   Psychiatric:         Mood and Affect: Mood normal.         Behavior: Behavior normal.         Lab Results: I have reviewed the following results:CBC/BMP:   .     04/08/25  0456   WBC 17.86*   HGB 11.4*   HCT 35.0      BANDSPCT 4   SODIUM 137   K 3.7      CO2 31   BUN 9   CREATININE 0.55*   GLUC 108   MG 2.0   PHOS 1.4*    , Creatinine Clearance: Estimated Creatinine Clearance: 138.1 mL/min (A) (by C-G formula based on SCr of 0.55 mg/dL (L))., LFTs:   .     04/08/25  0456   AST 18   ALT 34   ALB 2.9*   TBILI 0.90   ALKPHOS 86    , PTT/INR:No new results in last 24 hours.

## 2025-04-08 NOTE — ASSESSMENT & PLAN NOTE
54-year-old female with past medical history of NELLI, hypertension, obesity, and myositis who presented to ED with back/abdominal pain with nausea/vomiting.  Found to have an elevated lipase level >12k with LFT abnormalities.  CT imaging demonstrating acute interstitial pancreatitis along with cholelithiasis but no evidence of cholecystitis or choledocholithiasis, no biliary dilation.  Patient without prior alcohol, tobacco, or marijuana use.  Medications reviewed.  Triglycerides normal and IgG normal.  Etiology of pancreatitis likely gallstone induced, advise CCY prior to discharge if possible, timing TBD per surgery team.     Over the weekend, patient with ongoing WBC elevation to 28k.  She also developed tachycardia with new onset hypoxia and was transferred to the ICU.  IV fluids initially discontinued due to concern for volume overload.  Labs also demonstrating LILIAN with serum creatinine elevation to 2.06, new onset hypoxia with possible infiltrate on CXR. Patient given lasix out of concern for volume overload. Procal elevated.  Patient also developed LILIAN with Cr increasing to 2.06, likely prerenal in setting of diuresis and stopping of IVF out of concern for fluid overload. Suspect her respiratory status is related to atelectasis with possible developing pneumonia rather than fluid overload. MRI without evidence of cholangitis but with ongoing pancreatitis without discrete collection, prominent but nondilated CBD due to inflammation, and gallstones. Repeat CT imaging completed 4/7 to further evaluate ongoing pain and with evidence of pancreatic necrosis and a developing peripancreatic fluid collection adjacent to the stomach measuring 6.4 cm. Question of possible thrombosis in the main portal vein and SMW. Collections not amenable to drainage currently.     Today, patient's pain improved. Ambulating with nursing. Advanced to clear liquid diet with toast/crackers. However, patient with worsening pain with PO  intake. Will continue to trial food while monitoring pain. If patient unable to tolerate but mouth in the next 24 to 48 hours, may need to consider NJ placement. Otherwise, recommend ongoing conservative, symptomatic as below.     Plan:   Aggressive IVF hydration; continue LR @ 125 cc/hr  Monitor urine output along with BUN/sCr; maintain UO of >0.5 ml/kg/hour  Current diet: clear liquid with toast/crackers, advance diet slowly as patient tolerates  Consider enteral feeding with NG/NJ tube placement in 48 hours if patient unable to tolerate PO  Continue to monitor abdominal examination, low threshold for repeat CT imaging  Monitor off antibiotics; monitor WBC and trend fever curve - infectious workup if patient becomes febrile  No indication to trend Lipase levels   Continue on Lovenox for DVT ppx  General surgery consulted for consideration of CCY this admission  PRN pain and anti-emetics; additional symptom management per primary team

## 2025-04-08 NOTE — PLAN OF CARE
Problem: PAIN - ADULT  Goal: Verbalizes/displays adequate comfort level or baseline comfort level  Description: Interventions:- Encourage patient to monitor pain and request assistance- Assess pain using appropriate pain scale- Administer analgesics based on type and severity of pain and evaluate response- Implement non-pharmacological measures as appropriate and evaluate response- Consider cultural and social influences on pain and pain management- Notify physician/advanced practitioner if interventions unsuccessful or patient reports new pain  Outcome: Progressing     Problem: DISCHARGE PLANNING  Goal: Discharge to home or other facility with appropriate resources  Description: INTERVENTIONS:- Identify barriers to discharge w/patient and caregiver- Arrange for needed discharge resources and transportation as appropriate- Identify discharge learning needs (meds, wound care, etc.)- Arrange for interpretive services to assist at discharge as needed- Refer to Case Management Department for coordinating discharge planning if the patient needs post-hospital services based on physician/advanced practitioner order or complex needs related to functional status, cognitive ability, or social support system  Outcome: Progressing     Problem: Knowledge Deficit  Goal: Patient/family/caregiver demonstrates understanding of disease process, treatment plan, medications, and discharge instructions  Description: Complete learning assessment and assess knowledge base.Interventions:- Provide teaching at level of understanding- Provide teaching via preferred learning methods  Outcome: Progressing     Problem: GASTROINTESTINAL - ADULT  Goal: Minimal or absence of nausea and/or vomiting  Description: INTERVENTIONS:- Administer IV fluids if ordered to ensure adequate hydration- Maintain NPO status until nausea and vomiting are resolved- Nasogastric tube if ordered- Administer ordered antiemetic medications as needed- Provide  nonpharmacologic comfort measures as appropriate- Advance diet as tolerated, if ordered- Consider nutrition services referral to assist patient with adequate nutrition and appropriate food choices  Outcome: Progressing     Problem: RESPIRATORY - ADULT  Goal: Achieves optimal ventilation and oxygenation  Description: INTERVENTIONS:- Assess for changes in respiratory status- Assess for changes in mentation and behavior- Position to facilitate oxygenation and minimize respiratory effort- Oxygen administered by appropriate delivery if ordered- Initiate smoking cessation education as indicated- Encourage broncho-pulmonary hygiene including cough, deep breathe, Incentive Spirometry- Assess the need for suctioning and aspirate as needed- Assess and instruct to report SOB or any respiratory difficulty- Respiratory Therapy support as indicated  Outcome: Progressing     Problem: METABOLIC, FLUID AND ELECTROLYTES - ADULT  Goal: Electrolytes maintained within normal limits  Description: INTERVENTIONS:- Monitor labs and assess patient for signs and symptoms of electrolyte imbalances- Administer electrolyte replacement as ordered- Monitor response to electrolyte replacements, including repeat lab results as appropriate- Instruct patient on fluid and nutrition as appropriate  Outcome: Progressing  Goal: Fluid balance maintained  Description: INTERVENTIONS:- Monitor labs - Monitor I/O and WT- Instruct patient on fluid and nutrition as appropriate- Assess for signs & symptoms of volume excess or deficit  Outcome: Progressing  Goal: Glucose maintained within target range  Description: INTERVENTIONS:- Monitor Blood Glucose as ordered- Assess for signs and symptoms of hyperglycemia and hypoglycemia- Administer ordered medications to maintain glucose within target range- Assess nutritional intake and initiate nutrition service referral as needed  Outcome: Progressing     Problem: Nutrition/Hydration-ADULT  Goal: Nutrient/Hydration  intake appropriate for improving, restoring or maintaining nutritional needs  Description: Monitor and assess patient's nutrition/hydration status for malnutrition. Collaborate with interdisciplinary team and initiate plan and interventions as ordered.  Monitor patient's weight and dietary intake as ordered or per policy. Utilize nutrition screening tool and intervene as necessary. Determine patient's food preferences and provide high-protein, high-caloric foods as appropriate. INTERVENTIONS:- Monitor oral intake, urinary output, labs, and treatment plans- Assess nutrition and hydration status and recommend course of action- Evaluate amount of meals eaten- Assist patient with eating if necessary - Allow adequate time for meals- Recommend/ encourage appropriate diets, oral nutritional supplements, and vitamin/mineral supplements- Order, calculate, and assess calorie counts as needed- Recommend, monitor, and adjust tube feedings and TPN/PPN based on assessed needs- Assess need for intravenous fluids- Provide specific nutrition/hydration education as appropriate- Include patient/family/caregiver in decisions related to nutrition  Outcome: Progressing

## 2025-04-08 NOTE — PROGRESS NOTES
Progress Note - Critical Care/ICU   Name: Darcie Reyes 54 y.o. female I MRN: [id number]  Unit/Bed#: ICU 09 I Date of Admission: 4/4/2025   Date of Service: 4/8/2025 I Hospital Day: 4      Assessment & Plan  Pancreatitis, acute  Patient presented with complaints of abdominal pain, nausea, and vomiting  CTAP showing signs of acute uncomplicated pancreatitis as well as cholelithiasis without cholecystitis  Pain currently controlled with Oxycodone  MRCP without evidence of stones in CBD or cholangitis  CT abd/pelvis: Worsening acute pancreatitis  with hypoenhancement in the pancreatic body suspicious for pancreatic parenchymal necrosis.   Abdominal pain improved on exam     Plan:  General Surgery and GI following  Continue IV fluids 125 ml/hr  Continue pain med regimen(oxycodone 5 mg)  Plan to schedule tylenol and continue with oxycodone  Tolerating diet    Acute respiratory insufficiency  Requirement of mid flow oxygen, thought to be concerning for vascular congestion, IV fluids were held and patient was given a dose of Lasix 40 mg  Chest x-ray does not look concerning for prominent pulmonary vasculature  IV fluids resumed on 4/6, given kidney injury and pancreatitis    Plan:  Respiratory status stable   SIRS (systemic inflammatory response syndrome) (HCC)  Initially meeting SIRS criteria with tachycardia in the 130s, respiratory rate greater than 20 and leukocytosis. Procalc 1.29 from 2.74. Leukocytes trending down     Plan:  Follow-up blood cultures  No clear source of infection at this time, but patient could certainly meet SIRS due to pancreatitis    Essential hypertension  Home medication 5 mg lisinopril    Plan:  Plan to restart with resolution of LILIAN  Hepatic steatosis    Class 3 severe obesity due to excess calories without serious comorbidity with body mass index (BMI) of 40.0 to 44.9 in adult (HCC)      Thrombocytopenia (HCC)  Secondary to Pancreatitis   Disposition: Med Surg with Telemetry    ICU Core  Measures     A: Assess, Prevent, and Manage Pain Has pain been assessed? Yes  Need for changes to pain regimen? No   B: Both SAT/SAT  N/A   C: Choice of Sedation RASS Goal: 0 Alert and Calm or N/A patient not on sedation  Need for changes to sedation or analgesia regimen? NA   D: Delirium CAM-ICU: Negative   E: Early Mobility  Plan for early mobility? Yes   F: Family Engagement Plan for family engagement today? Yes         Prophylaxis:  VTE VTE covered by:  enoxaparin, Subcutaneous, 40 mg at 04/07/25 0918       Stress Ulcer  not ordered         24 Hour Events : No acute event overnight. This morning, patient reported that she tolerated her diet and her abdominal pain has improved. She in endorsed that her last bowel movement was Thursday. Patient denied nausea, vomiting, fever, and chill.   Subjective       Objective :                   Vitals I/O      Most Recent Min/Max in 24hrs   Temp 98.2 °F (36.8 °C) Temp  Min: 97.8 °F (36.6 °C)  Max: 99.2 °F (37.3 °C)   Pulse (!) 108 Pulse  Min: 102  Max: 119   Resp 13 Resp  Min: 12  Max: 32   /63 BP  Min: 117/78  Max: 167/77   O2 Sat 94 % SpO2  Min: 85 %  Max: 96 %      Intake/Output Summary (Last 24 hours) at 4/8/2025 0813  Last data filed at 4/8/2025 0600  Gross per 24 hour   Intake 2870 ml   Output 1950 ml   Net 920 ml       Diet Surgical; Cl Liq [place] [employer]    Invasive Monitoring           Physical Exam   Physical Exam  Vitals and nursing note reviewed.   Skin:     General: Skin is warm and dry.   HENT:      Head: Normocephalic and atraumatic.   Cardiovascular:      Rate and Rhythm: Regular rhythm. Tachycardia present.      Pulses: Normal pulses.      Heart sounds: Normal heart sounds, S1 normal and S2 normal.   Musculoskeletal:      Right lower leg: No edema.      Left lower leg: No edema.   Abdominal:      Palpations: Abdomen is soft.      Tenderness: There is abdominal tenderness.   Constitutional:       General: She is awake.      Appearance: She is obese.    Pulmonary:      Effort: Pulmonary effort is normal.      Breath sounds: Wheezing present.   Neurological:      Mental Status: She is alert and oriented to person, place and time.          Diagnostic Studies        Lab Results: I have reviewed the following results:     Medications:  Scheduled PRN   chlorhexidine, 15 mL, Q12H GRZEGORZ  enoxaparin, 40 mg, Daily  [Held by provider] lisinopril, 5 mg, Daily  potassium phosphate, 30 mmol, Once      acetaminophen, 975 mg, Q8H PRN  aluminum-magnesium hydroxide-simethicone, 30 mL, Q6H PRN  HYDROmorphone, 1 mg, Q4H PRN  ondansetron, 4 mg, Q6H PRN  oxyCODONE, 5 mg, Q4H PRN   Or  oxyCODONE, 10 mg, Q4H PRN  polyethylene glycol, 17 g, Daily PRN  simethicone, 80 mg, 4x Daily PRN       Continuous    lactated ringers, 125 mL/hr, Last Rate: 125 mL/hr (04/07/25 7785)         Labs:   CBC    Recent Labs     04/07/25 0630 04/08/25  0456   WBC 18.31* 17.86*   HGB 11.8 11.4*   HCT 36.7 35.0   * 151   BANDSPCT  --  4     BMP    Recent Labs     04/07/25 0630 04/08/25  0456   SODIUM 137 137   K 3.7 3.7    101   CO2 30 31   AGAP 5 5   BUN 17 9   CREATININE 0.63 0.55*   CALCIUM 7.9* 8.0*       Coags    No recent results     Additional Electrolytes  Recent Labs     04/08/25  0456   MG 2.0   PHOS 1.4*          Blood Gas    No recent results  No recent results LFTs  Recent Labs     04/07/25 0630 04/08/25  0456   ALT 47  45 34   AST 24  23 18   ALKPHOS 80  75 86   ALB 3.1*  3.2* 2.9*   TBILI 1.27*  1.22* 0.90       Infectious  Recent Labs     04/07/25 0630   PROCALCITONI 1.29*     Glucose  Recent Labs     04/07/25 0630 04/08/25  0456   GLUC 111 108

## 2025-04-08 NOTE — PROGRESS NOTES
"Progress Note - Surgery-General   Name: Darcie Reyes 54 y.o. female I MRN: [id number]  Unit/Bed#: ICU 09 I Date of Admission: 2025   Date of Service: 2025 I Hospital Day: 4     Assessment & Plan  Pancreatitis, acute  54-year-old female with gallstone pancreatitis without cholecystitis    Afebrile, tachy low 110s, on 2LNC  Morning labs pending    Plan:  Clear liquid diet as tolerated, consider addition of toast and crackers, ok for butter/jam modifier   Follow-up morning labs, evaluate creatinine and leukocytosis  Monitor abdominal exam   Pain control PRN  LILIAN (acute kidney injury) (HCC)  IVF fluid resuscitation  F/u repeat creatinine  Thrombocytopenia (HCC)      Subjective/Objective     Subjective:   Patient seen and examined bedside.  Denies nausea or emesis.  Tolerating clears.  Passing flatus. No BM.    Objective:   Vitals:Blood pressure 141/63, pulse (!) 108, temperature 98.2 °F (36.8 °C), temperature source Oral, resp. rate 13, height 5' 2\" (1.575 m), weight 114 kg (251 lb 5.2 oz), SpO2 94%.  Temp (24hrs), Av.6 °F (37 °C), Min:97.8 °F (36.6 °C), Max:99.2 °F (37.3 °C)        Intake/Output Summary (Last 24 hours) at 2025 0736  Last data filed at 2025 0600  Gross per 24 hour   Intake 5107.92 ml   Output 2150 ml   Net 2957.92 ml       Invasive Devices       Peripheral Intravenous Line  Duration             Peripheral IV 25 Left;Ventral (anterior) Forearm 1 day    Peripheral IV 25 Proximal;Right;Ventral (anterior) Forearm 1 day                    Physical Exam:  General: No acute distress, alert  CV: Well perfused, tachycardic  Lungs: Normal work of breathing, no increased respiratory effort on 2LNC  Abdomen: Soft, minimal epigastric tenderness, non distended  Extremities: No edema, clubbing or cyanosis  Skin: Warm, dry    Lab Results: Pending    VTE Prophylaxis: Sequential compression device (Venodyne)  and Enoxaparin (Lovenox)    Nita Olmos MD  2025    "

## 2025-04-08 NOTE — ASSESSMENT & PLAN NOTE
54-year-old female with gallstone pancreatitis without cholecystitis    Afebrile, tachy low 110s, on 2LNC  Morning labs pending    Plan:  Clear liquid diet as tolerated, consider addition of toast and crackers, ok for butter/jam modifier   Follow-up morning labs, evaluate creatinine and leukocytosis  Monitor abdominal exam   Pain control PRN

## 2025-04-08 NOTE — ASSESSMENT & PLAN NOTE
Requirement of mid flow oxygen, thought to be concerning for vascular congestion, IV fluids were held and patient was given a dose of Lasix 40 mg  Chest x-ray does not look concerning for prominent pulmonary vasculature  IV fluids resumed on 4/6, given kidney injury and pancreatitis    Plan:  Respiratory status stable

## 2025-04-08 NOTE — PROGRESS NOTES
Critical Care Interval Transfer Note:    Brief Hospital Summary:   Darcie Reyes is a 54 year old  female with past medical history of obesity, hypertension, likely sleep apnea, and myositis who presented with worsening abdominal pain radiating to her back and nausea/vomiting.Admitted on 4/04 for gallstone pancreatitis without cholecystitis. Admitted to  for acute hypoxemic respiratory failure. Currently on 3 L NC. Abdomina pain controlled. Stable for medsurg with tele due to tachycardia.       4/4-Acute uncomplicated pancreatitis. Cholelithiasis  4/5-  MRCP without evidence of stones in CBD or cholangitis   4/5-Low lung volumes with left greater than right bibasilar opacity, likely atelectasis. Pneumonia not excluded in the appropriate clinical setting.Hyperdense objects in the splenic flexure which could be due to gallstones given history of gallstone pancreatitis.  4/7-Overall slight interval worsening of acute pancreatitis with development of hypoenhancement in the pancreatic body suspicious for pancreatic parenchymal necrosis      Barriers to discharge:   Oxygen demand   Acute Pancreatitis management  Possible Cholecystectomy inpatient vs outpatient     Consults: IP CONSULT TO GASTROENTEROLOGY  IP CONSULT TO ACUTE CARE SURGERY  IP CONSULT TO CASE MANAGEMENT    Recommended to review admission imaging for incidental findings and document in discharge navigator: Chart reviewed, no known incidental findings noted at this time.      Discharge Plan: TBA       Patient seen and evaluated by Critical Care today and deemed to be appropriate for transfer to [facility] with Telemetry. Spoke to Dr. Giraldo from [facility] to accept transfer. Critical care can be contacted via SecureChat with any questions or concerns. Please use the Critical Care AP Role in Secure Chat for any provider inquires until the patient is transferred out of the ICU or until tomorrow at 0600.

## 2025-04-09 ENCOUNTER — APPOINTMENT (INPATIENT)
Dept: ULTRASOUND IMAGING | Facility: HOSPITAL | Age: 55
DRG: 444 | End: 2025-04-09
Payer: COMMERCIAL

## 2025-04-09 LAB
ALBUMIN SERPL BCG-MCNC: 3.1 G/DL (ref 3.5–5)
ALP SERPL-CCNC: 103 U/L (ref 34–104)
ALT SERPL W P-5'-P-CCNC: 27 U/L (ref 7–52)
ANION GAP SERPL CALCULATED.3IONS-SCNC: 6 MMOL/L (ref 4–13)
ANISOCYTOSIS BLD QL SMEAR: PRESENT
AST SERPL W P-5'-P-CCNC: 19 U/L (ref 13–39)
BASOPHILS # BLD MANUAL: 0 THOUSAND/UL (ref 0–0.1)
BASOPHILS NFR MAR MANUAL: 0 % (ref 0–1)
BILIRUB DIRECT SERPL-MCNC: 0.27 MG/DL (ref 0–0.2)
BILIRUB SERPL-MCNC: 0.66 MG/DL (ref 0.2–1)
BUN SERPL-MCNC: 7 MG/DL (ref 5–25)
CALCIUM SERPL-MCNC: 7.9 MG/DL (ref 8.4–10.2)
CHLORIDE SERPL-SCNC: 102 MMOL/L (ref 96–108)
CO2 SERPL-SCNC: 29 MMOL/L (ref 21–32)
CREAT SERPL-MCNC: 0.44 MG/DL (ref 0.6–1.3)
EOSINOPHIL # BLD MANUAL: 0.15 THOUSAND/UL (ref 0–0.4)
EOSINOPHIL NFR BLD MANUAL: 1 % (ref 0–6)
ERYTHROCYTE [DISTWIDTH] IN BLOOD BY AUTOMATED COUNT: 14.3 % (ref 11.6–15.1)
GFR SERPL CREATININE-BSD FRML MDRD: 114 ML/MIN/1.73SQ M
GLUCOSE SERPL-MCNC: 154 MG/DL (ref 65–140)
HCT VFR BLD AUTO: 33.9 % (ref 34.8–46.1)
HGB BLD-MCNC: 10.9 G/DL (ref 11.5–15.4)
LG PLATELETS BLD QL SMEAR: PRESENT
LYMPHOCYTES # BLD AUTO: 1.97 THOUSAND/UL (ref 0.6–4.47)
LYMPHOCYTES # BLD AUTO: 12 % (ref 14–44)
MAGNESIUM SERPL-MCNC: 2.2 MG/DL (ref 1.9–2.7)
MCH RBC QN AUTO: 31.1 PG (ref 26.8–34.3)
MCHC RBC AUTO-ENTMCNC: 32.2 G/DL (ref 31.4–37.4)
MCV RBC AUTO: 97 FL (ref 82–98)
METAMYELOCYTE ABSOLUTE CT: 0.45 THOUSAND/UL (ref 0–0.1)
METAMYELOCYTES NFR BLD MANUAL: 3 % (ref 0–1)
MONOCYTES # BLD AUTO: 1.21 THOUSAND/UL (ref 0–1.22)
MONOCYTES NFR BLD: 8 % (ref 4–12)
MYELOCYTE ABSOLUTE CT: 0.3 THOUSAND/UL (ref 0–0.1)
MYELOCYTES NFR BLD MANUAL: 2 % (ref 0–1)
NEUTROPHILS # BLD MANUAL: 11.05 THOUSAND/UL (ref 1.85–7.62)
NEUTS BAND NFR BLD MANUAL: 16 % (ref 0–8)
NEUTS SEG NFR BLD AUTO: 57 % (ref 43–75)
PHOSPHATE SERPL-MCNC: 2.2 MG/DL (ref 2.7–4.5)
PLATELET # BLD AUTO: 171 THOUSANDS/UL (ref 149–390)
PLATELET BLD QL SMEAR: ADEQUATE
PMV BLD AUTO: 11.4 FL (ref 8.9–12.7)
POLYCHROMASIA BLD QL SMEAR: PRESENT
POTASSIUM SERPL-SCNC: 3.8 MMOL/L (ref 3.5–5.3)
PROT SERPL-MCNC: 6.1 G/DL (ref 6.4–8.4)
RBC # BLD AUTO: 3.51 MILLION/UL (ref 3.81–5.12)
RBC MORPH BLD: PRESENT
SODIUM SERPL-SCNC: 137 MMOL/L (ref 135–147)
VARIANT LYMPHS # BLD AUTO: 1 %
WBC # BLD AUTO: 15.14 THOUSAND/UL (ref 4.31–10.16)

## 2025-04-09 PROCEDURE — 93976 VASCULAR STUDY: CPT

## 2025-04-09 PROCEDURE — 83735 ASSAY OF MAGNESIUM: CPT

## 2025-04-09 PROCEDURE — 84100 ASSAY OF PHOSPHORUS: CPT

## 2025-04-09 PROCEDURE — 99232 SBSQ HOSP IP/OBS MODERATE 35: CPT | Performed by: SPECIALIST

## 2025-04-09 PROCEDURE — 80048 BASIC METABOLIC PNL TOTAL CA: CPT

## 2025-04-09 PROCEDURE — 80076 HEPATIC FUNCTION PANEL: CPT

## 2025-04-09 PROCEDURE — 85007 BL SMEAR W/DIFF WBC COUNT: CPT

## 2025-04-09 PROCEDURE — 85027 COMPLETE CBC AUTOMATED: CPT

## 2025-04-09 PROCEDURE — 99232 SBSQ HOSP IP/OBS MODERATE 35: CPT | Performed by: INTERNAL MEDICINE

## 2025-04-09 RX ADMIN — OXYCODONE 5 MG: 5 TABLET ORAL at 09:00

## 2025-04-09 RX ADMIN — SENNOSIDES AND DOCUSATE SODIUM 1 TABLET: 50; 8.6 TABLET ORAL at 08:59

## 2025-04-09 RX ADMIN — OXYCODONE HYDROCHLORIDE 10 MG: 10 TABLET ORAL at 20:09

## 2025-04-09 RX ADMIN — POTASSIUM PHOSPHATE, MONOBASIC POTASSIUM PHOSPHATE, DIBASIC 12 MMOL: 224; 236 INJECTION, SOLUTION, CONCENTRATE INTRAVENOUS at 17:00

## 2025-04-09 RX ADMIN — HYDROMORPHONE HYDROCHLORIDE 1 MG: 1 INJECTION, SOLUTION INTRAMUSCULAR; INTRAVENOUS; SUBCUTANEOUS at 23:43

## 2025-04-09 RX ADMIN — POLYETHYLENE GLYCOL 3350 17 G: 17 POWDER, FOR SOLUTION ORAL at 08:59

## 2025-04-09 RX ADMIN — ENOXAPARIN SODIUM 40 MG: 40 INJECTION SUBCUTANEOUS at 08:59

## 2025-04-09 RX ADMIN — SENNOSIDES AND DOCUSATE SODIUM 1 TABLET: 50; 8.6 TABLET ORAL at 16:03

## 2025-04-09 RX ADMIN — DEXTROSE, SODIUM CHLORIDE, AND POTASSIUM CHLORIDE 84 ML/HR: 5; .45; .15 INJECTION INTRAVENOUS at 11:08

## 2025-04-09 RX ADMIN — LISINOPRIL 5 MG: 5 TABLET ORAL at 09:00

## 2025-04-09 RX ADMIN — CHLORHEXIDINE GLUCONATE 15 ML: 1.2 SOLUTION ORAL at 09:04

## 2025-04-09 NOTE — ASSESSMENT & PLAN NOTE
Elevated creatinine up to 2.06 after discontinuing IV fluids.  Likely prerenal in etiology. Fortunately following re-initiation of IV fluids, serum creatinine now back to baseline.

## 2025-04-09 NOTE — ASSESSMENT & PLAN NOTE
54-year-old female with past medical history of NELLI, hypertension, obesity, and myositis who presented to ED with back/abdominal pain with nausea/vomiting.  Found to have an elevated lipase level >12k with LFT abnormalities.  CT imaging demonstrating acute interstitial pancreatitis along with cholelithiasis but no evidence of cholecystitis or choledocholithiasis, no biliary dilation.  Patient without prior alcohol, tobacco, or marijuana use.  Medications reviewed.  Triglycerides normal and IgG normal.  Etiology of pancreatitis likely gallstone induced, advise CCY prior to discharge if possible, timing TBD per surgery team.     Over the weekend, patient with ongoing WBC elevation to 28k.  She also developed tachycardia with new onset hypoxia and was transferred to the ICU.  IV fluids initially discontinued due to concern for volume overload.  Labs also demonstrating LILIAN with serum creatinine elevation to 2.06, new onset hypoxia with possible infiltrate on CXR. Patient given lasix out of concern for volume overload. Procal elevated.  Patient also developed LILIAN with Cr increasing to 2.06, likely prerenal in setting of diuresis and stopping of IVF out of concern for fluid overload. Suspect her respiratory status is related to atelectasis with possible developing pneumonia rather than fluid overload. MRI without evidence of cholangitis but with ongoing pancreatitis without discrete collection, prominent but nondilated CBD due to inflammation, and gallstones. Repeat CT imaging completed 4/7 to further evaluate ongoing pain and worsening leukocytosis. CT with evidence of pancreatic necrosis and a developing peripancreatic fluid collection adjacent to the stomach measuring 6.4 cm. Question of possible thrombosis in the main portal vein and SMW. Collections not amenable to drainage currently.     Today, patient's pain continues to improve but does continue to remain presence. Ambulating to bathroom and urinating but no BM.  Advanced to GI low fat diet but PO intake continues to remain poor due to pain that worsens with eating. Patient to continue to try to eat today while monitoring her pain. If patient unable to tolerate by mouth in the next 24 hours, may need to consider NJ placement. Otherwise, recommend ongoing conservative, symptomatic as below.     Plan:   Continue on IVF hydration; Monitor urine output along with BUN/sCr  Current diet: GI low fat diet, advance diet slowly as patient tolerates  Consider enteral feeding with NG/NJ tube placement in 24 hours if unable to tolerate PO  Follow up RUQ US with dopplers ordered by general surgery   Continue to monitor abdominal examination, low threshold for repeat CT imaging  Monitor off antibiotics; monitor WBC and trend fever curve   No indication to trend Lipase levels   Continue on Lovenox for DVT ppx  General surgery consulted for consideration of CCY this admission  Will require CT imaging in 4-6 weeks to access necrosis and fluid collections   PRN pain and anti-emetics; additional symptom management per primary team

## 2025-04-09 NOTE — RESTORATIVE TECHNICIAN NOTE
Restorative Technician Note      Patient Name: Darcie Reyes     Restorative Tech Visit Date: 04/09/25  Note Type: Mobility  Patient Position Upon Consult: Supine  Activity Performed: Ambulated  Assistive Device: Other (Comment) (none)  Patient Position at End of Consult: All needs within reach; Supine; Bed/Chair alarm activated          ns

## 2025-04-09 NOTE — PROGRESS NOTES
Progress Note - Gastroenterology   Name: Darcie Reyes 54 y.o. female I MRN: [id number]  Unit/Bed#: E5 -01 I Date of Admission: 4/4/2025   Date of Service: 4/9/2025 I Hospital Day: 5     Assessment & Plan  Pancreatitis, acute  54-year-old female with past medical history of NELLI, hypertension, obesity, and myositis who presented to ED with back/abdominal pain with nausea/vomiting.  Found to have an elevated lipase level >12k with LFT abnormalities.  CT imaging demonstrating acute interstitial pancreatitis along with cholelithiasis but no evidence of cholecystitis or choledocholithiasis, no biliary dilation.  Patient without prior alcohol, tobacco, or marijuana use.  Medications reviewed.  Triglycerides normal and IgG normal.  Etiology of pancreatitis likely gallstone induced, advise CCY prior to discharge if possible, timing TBD per surgery team.     Over the weekend, patient with ongoing WBC elevation to 28k.  She also developed tachycardia with new onset hypoxia and was transferred to the ICU.  IV fluids initially discontinued due to concern for volume overload.  Labs also demonstrating LILIAN with serum creatinine elevation to 2.06, new onset hypoxia with possible infiltrate on CXR. Patient given lasix out of concern for volume overload. Procal elevated.  Patient also developed LILIAN with Cr increasing to 2.06, likely prerenal in setting of diuresis and stopping of IVF out of concern for fluid overload. Suspect her respiratory status is related to atelectasis with possible developing pneumonia rather than fluid overload. MRI without evidence of cholangitis but with ongoing pancreatitis without discrete collection, prominent but nondilated CBD due to inflammation, and gallstones. Repeat CT imaging completed 4/7 to further evaluate ongoing pain and worsening leukocytosis. CT with evidence of pancreatic necrosis and a developing peripancreatic fluid collection adjacent to the stomach measuring 6.4 cm. Question of  possible thrombosis in the main portal vein and SMW. Collections not amenable to drainage currently.     Today, patient's pain continues to improve but does continue to remain presence. Ambulating to bathroom and urinating but no BM. Advanced to GI low fat diet but PO intake continues to remain poor due to pain that worsens with eating. Patient to continue to try to eat today while monitoring her pain. If patient unable to tolerate by mouth in the next 24 hours, may need to consider NJ placement. Otherwise, recommend ongoing conservative, symptomatic as below.     Plan:   Continue on IVF hydration; Monitor urine output along with BUN/sCr  Current diet: GI low fat diet, advance diet slowly as patient tolerates  Consider enteral feeding with NG/NJ tube placement in 24 hours if unable to tolerate PO  Follow up RUQ US with dopplers ordered by general surgery   Continue to monitor abdominal examination, low threshold for repeat CT imaging  Monitor off antibiotics; monitor WBC and trend fever curve   No indication to trend Lipase levels   Continue on Lovenox for DVT ppx  General surgery consulted for consideration of CCY this admission  Will require CT imaging in 4-6 weeks to access necrosis and fluid collections   PRN pain and anti-emetics; additional symptom management per primary team   Transaminitis  Patient with elevated serum transaminases on arrival.  LFTs have now normalized.   Hepatic steatosis  Patient with CT imaging demonstrating hepatic steatosis.  FIB-4 2.34, indeterminate. Patient with multiple metabolic risk factors; Will need outpatient GI follow-up for consideration of ultrasound elastography to further risk stratify and evaluate for advanced fibrosis.  Also recommend follow-up with PCP for management of metabolic risk factors.  LILIAN (acute kidney injury) (HCC)  Elevated creatinine up to 2.06 after discontinuing IV fluids.  Likely prerenal in etiology. Fortunately following re-initiation of IV fluids,  serum creatinine now back to baseline.  SIRS (systemic inflammatory response syndrome) (HCC)  Management per primary       Subjective  Patient seen and examined at bedside. Sitting in bedside chair comfortably. Does admit to ongoing epigastric and LUQ abdominal pain. PO intake remains poor. No bowel movements. Ambulated patient to bathroom with minimal assistance.     Objective :  Temp:  [98.2 °F (36.8 °C)-99.8 °F (37.7 °C)] 98.2 °F (36.8 °C)  HR:  [] 91  BP: (129-174)/(63-92) 148/92  Resp:  [14-28] 18  SpO2:  [91 %-96 %] 95 %  O2 Device: Nasal cannula  Nasal Cannula O2 Flow Rate (L/min):  [2 L/min-3 L/min] 2 L/min    Physical Exam  Constitutional:       General: She is not in acute distress.     Appearance: She is obese. She is not ill-appearing or toxic-appearing.   HENT:      Head: Normocephalic.      Nose: Nose normal. No congestion.   Eyes:      General: No scleral icterus.  Cardiovascular:      Rate and Rhythm: Normal rate.      Pulses: Normal pulses.   Pulmonary:      Effort: Pulmonary effort is normal. No respiratory distress.   Abdominal:      General: Abdomen is flat. There is no distension.      Palpations: Abdomen is soft.      Tenderness: There is abdominal tenderness in the epigastric area and left upper quadrant. There is no guarding or rebound.   Musculoskeletal:      Cervical back: Normal range of motion.   Skin:     General: Skin is warm.      Capillary Refill: Capillary refill takes less than 2 seconds.      Coloration: Skin is not pale.   Neurological:      Mental Status: She is alert and oriented to person, place, and time. Mental status is at baseline.   Psychiatric:         Mood and Affect: Mood normal.         Behavior: Behavior normal.         Lab Results: I have reviewed the following results:CBC/BMP:   .     04/09/25  0525   WBC 15.14*   HGB 10.9*   HCT 33.9*      SODIUM 137   K 3.8      CO2 29   BUN 7   CREATININE 0.44*   GLUC 154*   MG 2.2   PHOS 2.2*    , Creatinine  Clearance: Estimated Creatinine Clearance: 172.6 mL/min (A) (by C-G formula based on SCr of 0.44 mg/dL (L))., LFTs:   .     04/09/25  0525   AST 19   ALT 27   ALB 3.1*   TBILI 0.66   ALKPHOS 103    , PTT/INR:No new results in last 24 hours.

## 2025-04-09 NOTE — PROGRESS NOTES
"Progress Note - Surgery-General   Name: Darcie Reyes 54 y.o. female I MRN: [id number]  Unit/Bed#: E5 -01 I Date of Admission: 2025   Date of Service: 2025 I Hospital Day: 5     Assessment & Plan  Pancreatitis, acute  54-year-old female with gallstone pancreatitis without cholecystitis    Mildly tachycardic to low 100s, otherwise afebrile and normotensive on room air    UOP 1.1L x 2 unmeasured    WBC 15.1 from 17.8  Hb 10.9 from 11.4  Creatinine 0.66 from 0.90    Plan:  Advance diet  Continue nonoperative intervention at this point  Monitor abdominal exam   Analgesia and antiemetic as needed  DVT PPx with Lovenox  Patient needs to be OOB  LILIAN (acute kidney injury) (HCC)  IVF fluid resuscitation  F/u repeat creatinine  Thrombocytopenia (HCC)      Subjective/Objective     Subjective:   P patient seen and examined at bedside.  NAEON.  Denies N/V.  Endorses some general diffuse abdominal pain.  Passing flatus but denies BM.  States she is not eating much but would try.    Objective:   Vitals:Blood pressure 148/92, pulse 91, temperature 98.2 °F (36.8 °C), resp. rate 18, height 5' 2\" (1.575 m), weight 114 kg (250 lb 14.1 oz), SpO2 95%.  Temp (24hrs), Av.8 °F (37.1 °C), Min:98.2 °F (36.8 °C), Max:99.8 °F (37.7 °C)        Intake/Output Summary (Last 24 hours) at 2025 0631  Last data filed at 2025 2252  Gross per 24 hour   Intake 1865.65 ml   Output 1150 ml   Net 715.65 ml       Invasive Devices       Peripheral Intravenous Line  Duration             Peripheral IV 25 Left;Ventral (anterior) Forearm 2 days    Peripheral IV 25 Proximal;Right;Ventral (anterior) Forearm 1 day                    Physical Exam:  General: No acute distress, alert  CV: Well perfused, tachycardic  Lungs: Normal work of breathing, no increased respiratory effort  Abdomen: Soft, minimal epigastric tenderness, non distended  Extremities: No edema, clubbing or cyanosis  Skin: Warm, dry    Lab Results: Pending    VTE " Prophylaxis: Sequential compression device (Venodyne)  and Enoxaparin (Lovenox)    Axel Cueto DO  4/9/2025

## 2025-04-09 NOTE — ASSESSMENT & PLAN NOTE
54-year-old female with gallstone pancreatitis without cholecystitis    Mildly tachycardic to low 100s, otherwise afebrile and normotensive on room air    UOP 1.1L x 2 unmeasured    WBC 15.1 from 17.8  Hb 10.9 from 11.4  Creatinine 0.66 from 0.90    Plan:  Advance diet  Continue nonoperative intervention at this point  Monitor abdominal exam   Analgesia and antiemetic as needed  DVT PPx with Lovenox  Patient needs to be OOB

## 2025-04-10 VITALS
RESPIRATION RATE: 18 BRPM | OXYGEN SATURATION: 97 % | HEIGHT: 62 IN | BODY MASS INDEX: 44.59 KG/M2 | DIASTOLIC BLOOD PRESSURE: 83 MMHG | SYSTOLIC BLOOD PRESSURE: 140 MMHG | TEMPERATURE: 99.5 F | WEIGHT: 242.29 LBS | HEART RATE: 97 BPM

## 2025-04-10 LAB
ANION GAP SERPL CALCULATED.3IONS-SCNC: 7 MMOL/L (ref 4–13)
BASOPHILS # BLD AUTO: 0.03 THOUSANDS/ÂΜL (ref 0–0.1)
BASOPHILS NFR BLD AUTO: 0 % (ref 0–1)
BUN SERPL-MCNC: 8 MG/DL (ref 5–25)
CALCIUM SERPL-MCNC: 8 MG/DL (ref 8.4–10.2)
CHLORIDE SERPL-SCNC: 103 MMOL/L (ref 96–108)
CO2 SERPL-SCNC: 28 MMOL/L (ref 21–32)
CREAT SERPL-MCNC: 0.44 MG/DL (ref 0.6–1.3)
EOSINOPHIL # BLD AUTO: 0.07 THOUSAND/ÂΜL (ref 0–0.61)
EOSINOPHIL NFR BLD AUTO: 0 % (ref 0–6)
ERYTHROCYTE [DISTWIDTH] IN BLOOD BY AUTOMATED COUNT: 14.2 % (ref 11.6–15.1)
GFR SERPL CREATININE-BSD FRML MDRD: 114 ML/MIN/1.73SQ M
GLUCOSE SERPL-MCNC: 120 MG/DL (ref 65–140)
HCT VFR BLD AUTO: 33.7 % (ref 34.8–46.1)
HGB BLD-MCNC: 10.7 G/DL (ref 11.5–15.4)
IMM GRANULOCYTES # BLD AUTO: >0.5 THOUSAND/UL (ref 0–0.2)
IMM GRANULOCYTES NFR BLD AUTO: 10 % (ref 0–2)
LYMPHOCYTES # BLD AUTO: 2.63 THOUSANDS/ÂΜL (ref 0.6–4.47)
LYMPHOCYTES NFR BLD AUTO: 17 % (ref 14–44)
MAGNESIUM SERPL-MCNC: 2.2 MG/DL (ref 1.9–2.7)
MCH RBC QN AUTO: 30.1 PG (ref 26.8–34.3)
MCHC RBC AUTO-ENTMCNC: 31.8 G/DL (ref 31.4–37.4)
MCV RBC AUTO: 95 FL (ref 82–98)
MONOCYTES # BLD AUTO: 1.52 THOUSAND/ÂΜL (ref 0.17–1.22)
MONOCYTES NFR BLD AUTO: 10 % (ref 4–12)
NEUTROPHILS # BLD AUTO: 9.99 THOUSANDS/ÂΜL (ref 1.85–7.62)
NEUTS SEG NFR BLD AUTO: 63 % (ref 43–75)
NRBC BLD AUTO-RTO: 0 /100 WBCS
PHOSPHATE SERPL-MCNC: 2.7 MG/DL (ref 2.7–4.5)
PLATELET # BLD AUTO: 194 THOUSANDS/UL (ref 149–390)
PMV BLD AUTO: 11.5 FL (ref 8.9–12.7)
POTASSIUM SERPL-SCNC: 3.6 MMOL/L (ref 3.5–5.3)
RBC # BLD AUTO: 3.56 MILLION/UL (ref 3.81–5.12)
SODIUM SERPL-SCNC: 138 MMOL/L (ref 135–147)
WBC # BLD AUTO: 15.84 THOUSAND/UL (ref 4.31–10.16)

## 2025-04-10 PROCEDURE — 99232 SBSQ HOSP IP/OBS MODERATE 35: CPT | Performed by: INTERNAL MEDICINE

## 2025-04-10 PROCEDURE — NC001 PR NO CHARGE: Performed by: SPECIALIST

## 2025-04-10 PROCEDURE — 85027 COMPLETE CBC AUTOMATED: CPT | Performed by: STUDENT IN AN ORGANIZED HEALTH CARE EDUCATION/TRAINING PROGRAM

## 2025-04-10 PROCEDURE — 99232 SBSQ HOSP IP/OBS MODERATE 35: CPT | Performed by: SPECIALIST

## 2025-04-10 PROCEDURE — 83735 ASSAY OF MAGNESIUM: CPT | Performed by: STUDENT IN AN ORGANIZED HEALTH CARE EDUCATION/TRAINING PROGRAM

## 2025-04-10 PROCEDURE — 84100 ASSAY OF PHOSPHORUS: CPT | Performed by: STUDENT IN AN ORGANIZED HEALTH CARE EDUCATION/TRAINING PROGRAM

## 2025-04-10 PROCEDURE — 80048 BASIC METABOLIC PNL TOTAL CA: CPT | Performed by: STUDENT IN AN ORGANIZED HEALTH CARE EDUCATION/TRAINING PROGRAM

## 2025-04-10 RX ADMIN — LISINOPRIL 5 MG: 5 TABLET ORAL at 09:14

## 2025-04-10 RX ADMIN — ENOXAPARIN SODIUM 40 MG: 40 INJECTION SUBCUTANEOUS at 09:15

## 2025-04-10 RX ADMIN — SENNOSIDES AND DOCUSATE SODIUM 1 TABLET: 50; 8.6 TABLET ORAL at 09:14

## 2025-04-10 RX ADMIN — POLYETHYLENE GLYCOL 3350 17 G: 17 POWDER, FOR SOLUTION ORAL at 09:14

## 2025-04-10 NOTE — ASSESSMENT & PLAN NOTE
Acute gallstone pancreatitis, progressive pain and appearance on MRI concerning for progressive necrotizing pancreatitis or another gallstone   General surgery and GI consulted

## 2025-04-10 NOTE — PROGRESS NOTES
Progress Note - Gastroenterology   Name: Darcie Reyes 54 y.o. female I MRN: [id number]  Unit/Bed#: E5 -01 I Date of Admission: 4/4/2025   Date of Service: 4/10/2025 I Hospital Day: 6    Assessment & Plan  Pancreatitis, acute  54-year-old female with past medical history of NELLI, hypertension, obesity, and myositis who presented to ED with back/abdominal pain with nausea/vomiting.  Found to have an elevated lipase level >12k with LFT abnormalities.  CT imaging demonstrating acute interstitial pancreatitis along with cholelithiasis but no evidence of cholecystitis or choledocholithiasis, no biliary dilation.  Patient without prior alcohol, tobacco, or marijuana use.  Medications reviewed.  Triglycerides normal and IgG normal.  Etiology of pancreatitis likely gallstone induced, advise CCY prior to discharge if possible, timing TBD per surgery team.     Over the weekend, patient with ongoing WBC elevation to 28k.  She also developed tachycardia with new onset hypoxia and was transferred to the ICU.  IV fluids initially discontinued due to concern for volume overload.  Labs also demonstrating LILIAN with serum creatinine elevation to 2.06, new onset hypoxia with possible infiltrate on CXR. Patient given lasix out of concern for volume overload. Procal elevated.  Patient also developed LILIAN with Cr increasing to 2.06, likely prerenal in setting of diuresis and stopping of IVF out of concern for fluid overload. Suspect her respiratory status is related to atelectasis with possible developing pneumonia rather than fluid overload. MRI without evidence of cholangitis but with ongoing pancreatitis without discrete collection, prominent but nondilated CBD due to inflammation, and gallstones. Repeat CT imaging completed 4/7 to further evaluate ongoing pain and worsening leukocytosis. CT with evidence of pancreatic necrosis and a developing peripancreatic fluid collection adjacent to the stomach measuring 6.4 cm. Question of  possible thrombosis in the main portal vein and SMW. Collections not amenable to drainage currently. RUQ US with liver dopplers on 4/9 with patent vasculature.     Today, patient's pain continues to improve - mild pain in the LUQ. Ambulating to the bathroom and appetite fortunately improving. Overall, patient appears to be clinical improved significantly today in comparison today. Recommend ongoing symptomatic management. Timing of cholecystectomy TBD and per general surgery team.     Plan:   Current diet: GI low fat diet, advance diet slowly as patient tolerates  Okay to discontinue IVF if patient tolerating PO intake   Monitor off antibiotics; monitor WBC and trend fever curve   No indication to trend Lipase levels   Continue on Lovenox for DVT ppx  General surgery consulted for consideration of CCY this admission  Will require CT imaging in 4-6 weeks to access necrosis and fluid collections   PRN pain and anti-emetics; additional symptom management per primary team   Transaminitis  Patient with elevated serum transaminases on arrival.  LFTs have now normalized.   Hepatic steatosis  Patient with CT imaging demonstrating hepatic steatosis.  FIB-4 2.34, indeterminate. Patient with multiple metabolic risk factors; Will need outpatient GI follow-up for consideration of ultrasound elastography to further risk stratify and evaluate for advanced fibrosis.  Also recommend follow-up with PCP for management of metabolic risk factors.  LILIAN (acute kidney injury) (HCC)  Elevated creatinine up to 2.06 after discontinuing IV fluids.  Likely prerenal in etiology. Fortunately following re-initiation of IV fluids, serum creatinine now back to baseline.  SIRS (systemic inflammatory response syndrome) (HCC)  Management per primary     Thank you for involving us in the care of Darciesilvio Reyes. No further GI intervention warranted at this time. Follow up with GI outpatient following completing of outpatient imaging. Will message  schedulers to help arranage. GI will sign off. Please reach out with any questions or concerns.       Subjective   Patient seen and examined at bedside. Lying in bed comfortably. Patient admits to improvement in abdominal pain today. Still persists but mild in the LUQ. Increased PO intake today. Patient offers no additional complaints.     Objective :  Temp:  [98.4 °F (36.9 °C)-99.7 °F (37.6 °C)] 98.4 °F (36.9 °C)  HR:  [102-106] 102  BP: (125-171)/(82-93) 125/82  Resp:  [16-18] 16  SpO2:  [91 %] 91 %  O2 Device: Nasal cannula  Nasal Cannula O2 Flow Rate (L/min):  [2 L/min] 2 L/min    Physical Exam  Constitutional:       General: She is not in acute distress.     Appearance: She is obese. She is not ill-appearing or toxic-appearing.   HENT:      Head: Normocephalic.      Nose: Nose normal. No congestion.   Eyes:      General: No scleral icterus.  Cardiovascular:      Rate and Rhythm: Normal rate.      Pulses: Normal pulses.   Pulmonary:      Effort: Pulmonary effort is normal. No respiratory distress.   Abdominal:      General: Abdomen is flat. Bowel sounds are normal. There is no distension.      Palpations: Abdomen is soft.      Tenderness: There is abdominal tenderness in the left upper quadrant. There is no guarding or rebound.   Musculoskeletal:      Cervical back: Normal range of motion.   Skin:     General: Skin is warm.      Capillary Refill: Capillary refill takes less than 2 seconds.      Coloration: Skin is not pale.   Neurological:      Mental Status: She is alert and oriented to person, place, and time. Mental status is at baseline.   Psychiatric:         Mood and Affect: Mood normal.         Behavior: Behavior normal.         Lab Results: I have reviewed the following results:CBC/BMP:   .     04/10/25  0511   WBC 15.84*   HGB 10.7*   HCT 33.7*      SODIUM 138   K 3.6      CO2 28   BUN 8   CREATININE 0.44*   GLUC 120   MG 2.2   PHOS 2.7    , Creatinine Clearance: Estimated Creatinine  Clearance: 169 mL/min (A) (by C-G formula based on SCr of 0.44 mg/dL (L))., LFTs: No new results in last 24 hours. , PTT/INR:No new results in last 24 hours.     Imaging Results Review: I reviewed radiology reports from this admission including: Ultrasound(s).

## 2025-04-10 NOTE — PLAN OF CARE
Problem: PAIN - ADULT  Goal: Verbalizes/displays adequate comfort level or baseline comfort level  Description: Interventions:- Encourage patient to monitor pain and request assistance- Assess pain using appropriate pain scale- Administer analgesics based on type and severity of pain and evaluate response- Implement non-pharmacological measures as appropriate and evaluate response- Consider cultural and social influences on pain and pain management- Notify physician/advanced practitioner if interventions unsuccessful or patient reports new pain  Outcome: Progressing     Problem: DISCHARGE PLANNING  Goal: Discharge to home or other facility with appropriate resources  Description: INTERVENTIONS:- Identify barriers to discharge w/patient and caregiver- Arrange for needed discharge resources and transportation as appropriate- Identify discharge learning needs (meds, wound care, etc.)- Arrange for interpretive services to assist at discharge as needed- Refer to Case Management Department for coordinating discharge planning if the patient needs post-hospital services based on physician/advanced practitioner order or complex needs related to functional status, cognitive ability, or social support system  Outcome: Progressing     Problem: Knowledge Deficit  Goal: Patient/family/caregiver demonstrates understanding of disease process, treatment plan, medications, and discharge instructions  Description: Complete learning assessment and assess knowledge base.Interventions:- Provide teaching at level of understanding- Provide teaching via preferred learning methods  Outcome: Progressing

## 2025-04-10 NOTE — CASE MANAGEMENT
Case Management Assessment & Discharge Planning Note    Patient name Darcie Reyes  Location [facility] /E5 -* MRN [id number]  : 1970 Date 4/10/2025       Current Admission Date: 2025  Current Admission Diagnosis:Pancreatitis, acute   Patient Active Problem List    Diagnosis Date Noted Date Diagnosed    Thrombocytopenia (HCC) 2025     LILIAN (acute kidney injury) ([facility]) 2025     Acute respiratory insufficiency 2025     SIRS (systemic inflammatory response syndrome) ([facility]) 2025     Idiopathic acute pancreatitis without infection or necrosis 2025     Transaminitis 2025     Cholelithiasis 2025     Pancreatitis, acute 2025     Hepatic steatosis 2025     Elevated alkaline phosphatase level 2024     Abnormal mammogram 2023     NELLI (obstructive sleep apnea) 2022     Moderate mixed hyperlipidemia not requiring statin therapy 2022     Essential hypertension 2021     Class 3 severe obesity due to excess calories without serious comorbidity with body mass index (BMI) of 40.0 to 44.9 in adult ([facility]) 2019       LOS (days): 6  Geometric Mean LOS (GMLOS) (days):   Days to GMLOS:     OBJECTIVE:    Risk of Unplanned Readmission Score: 12.46     Current admission status: Inpatient    Preferred Pharmacy:   [facility] #[id number] - [place], [place] - [address] [place] [address]  Phone: [phone] Fax: [phone]    Primary Care Provider: Esteban Rockwell MD    Primary Insurance: [employer]  Secondary Insurance:     ASSESSMENT:    Readmission Root Cause  30 Day Readmission: No    Patient Information  Admitted from:: Home  Mental Status: Alert  During Assessment patient was accompanied by: Not accompanied during assessment  Assessment information provided by:: Patient  Primary Caregiver: Self  Support Systems: Spouse/significant other, Children, Family members, Friends/neighbors  [facility]  of Residence: [place]  What [facility] do you live in?: [address]  Home entry access options. Select all that apply.: Stairs  Number of steps to enter home.: 6  Type of Current Residence: 2 story home  Upon entering residence, is there a bedroom on the main floor (no further steps)?: No  A bedroom is located on the following floor levels of residence (select all that apply):: 2nd Floor  Upon entering residence, is there a bathroom on the main floor (no further steps)?: No  Indicate which floors of current residence have a bathroom (select all the apply):: 2nd Floor  Number of steps to 2nd floor from main floor: One Flight  Living Arrangements: Lives w/ Spouse/significant other, Lives w/ Children, Lives w/ Family members  Is patient a ?: No    Activities of Daily Living Prior to Admission  Functional Status: Independent  Completes ADLs independently?: Yes  Ambulates independently?: Yes  Does patient use assisted devices?: No  Does patient currently own DME?: No  Does patient have a history of Outpatient Therapy (PT/OT)?: No  Does the patient have a history of Short-Term Rehab?: No  Does patient have a history of HHC?: No  Does patient currently have HHC?: No    Patient Information Continued  Does patient have prescription coverage?: Yes  Can the patient afford their medications and any related supplies (such as glucometers or test strips)?: No  Does patient receive dialysis treatments?: No  Does patient have a history of substance abuse?: No  Does patient have a history of Mental Health Diagnosis?: No    Means of Transportation  [facility]    DISCHARGE DETAILS:    Discharge planning discussed with:: pt  Freedom of Choice: Yes     CM contacted family/caregiver?: No- see comments (pt is alert and oriented)  Were Treatment Team discharge recommendations reviewed with patient/caregiver?: Yes  Did patient/caregiver verbalize understanding of patient care needs?: Yes  Were  patient/caregiver advised of the risks associated with not following Treatment Team discharge recommendations?: Yes    Requested Home Health Care         Is the patient interested in HHC at discharge?: No    DME Referral Provided  Referral made for DME?: No    Other Referral/Resources/Interventions Provided:  Interventions: None Indicated    Treatment Team Recommendation: Home  Discharge Destination Plan:: Home  Transport at Discharge : Family     Additional Comments: Cm met with pt and reviewed cm role. Pt lives with her family in a 2STH with 6 LUISA. Pt reports being ind with no use of DME. Her dtr transports her and will at d/c. Pt has no hx of HHC, STR, MH or D/A tx hx. No anticipated d/c needs identified.

## 2025-04-10 NOTE — DISCHARGE SUMMARY
Discharge Summary - Surgery-General   Name: Darcie Reyes 54 y.o. female I MRN: [id number]  Unit/Bed#: E5 -01 I Date of Admission: 4/4/2025   Date of Service: 4/10/2025 I Hospital Day: 6    Admission Date: 4/4/2025 0028  Discharge Date: 04/10/25  Admitting Diagnosis: Cholelithiasis [K80.20]  Pancreatitis, acute [K85.90]  Abdominal pain [R10.9]  Discharge Diagnosis:   Medical Problems       Resolved Problems  Date Reviewed: 4/6/2025   None         HPI:   Wil King: Darcie Reyes is a 54 y.o. female with a PMH of above who presents with c/o severe abdominal pain.  Reports pain started tonight in back, took Advil with no improvement.  Pain became so severe she started to vomit and could not walk, was screaming and crying due to intensity of pain.     Procedures Performed: No orders of the defined types were placed in this encounter.      Summary of Hospital Course:   Patient presented on 4/4 with gallstone pancreatitis.  She was admitted to the medical service and GI and surgery were consulted.  On 4/5 she underwent MRCP which was revealing of acute pancreatitis with early pancreatic necrosis with mid to distal CBD wall thickening and enhancement as well as small bilateral pleural effusions and bibasilar atelectasis.    On 4/6 she had increased oxygen requirements and tachycardia as well as a new LILIAN.  [place] to the ICU for continued resuscitation where she continued to progress and was able to be referred to the [facility] floor.    On 4/7 she underwent CT abdomen pelvis which was concerning for slight worsening of her pancreatitis with concerns for parenchymal pancreatic necrosis and peripancreatic fluid collection 6.4 cm.  There was also concern for a possible portal vein thrombosis due to decreased attenuation on the CT scan however that was ruled out with an ultrasound of her liver.    Over the next few days she continued to progress.  Her diet was slowly advanced to a low-fat diet, which she  was tolerating.  She was afebrile.  Her white count was downtrending.  She was not on antibiotics.    GI recommended MRI imaging in 4 to 6 weeks to assess pancreatic necrosis and fluid collections.  Given the extent of her pancreatitis it was decided to have the patient follow-up outpatient for cholecystectomy consideration.  She will follow-up with general surgery as well as her primary care provider prior to cholecystectomy.  She will also follow-up with GI as recommended.    Significant Findings, Care, Treatment and Services Provided: As noted, see daily progress notes for details.     Complications: Hypoxia, LILIAN, ICU admission    Condition at Discharge: stable       Discharge instructions/Information to patient and family:   See After Visit Summary (AVS) for information provided to patient and family.      Provisions for Follow-Up Care:  See after visit summary for information related to follow-up care and any pertinent home health orders.      PCP: Esteban Rockwell MD    Disposition: Home    Planned Readmission: Yes for cholecystectomy     Discharge Medications:  See after visit summary for reconciled discharge medications provided to patient and family.      Discharge Statement:  I have spent a total time of 30 minutes in caring for this patient on the day of the visit/encounter. .

## 2025-04-10 NOTE — PROGRESS NOTES
Progress Note - Pulmonology   Name: Darcie Reyes 54 y.o. female I MRN: [id number]  Unit/Bed#: E5 -01 I Date of Admission: 4/4/2025   Date of Service: 4/10/2025 I Hospital Day: 6     Assessment & Plan  Acute respiratory insufficiency  Imaging including CXR and CT ab showing bibasilar compressive atelectasis with small pleural effusions left > right   Pancreatitis, acute  Acute gallstone pancreatitis, progressive pain and appearance on MRI concerning for progressive necrotizing pancreatitis or another gallstone   General surgery and GI consulted   Class 3 severe obesity due to excess calories without serious comorbidity with body mass index (BMI) of 40.0 to 44.9 in adult ([facility])  BMI 44.31   SIRS (systemic inflammatory response syndrome) ([facility])  Initially meeting SIRS criteria with tachycardia, tachypnea, and leukocytosis. Procalc 1.29 from 2.74. Leukocytes trending down. Blood cultures negative   Monitoring off antibiotics in setting of acute pancreatitis   Downgraded form ICU 4/8, remains hemodynamically stable     Plan:   - Wean oxygen to SpO2 > 90%  - OOB as able, continue incentive spirometer  - Pleural effusions likely in the setting of acute pancreatitis and should resolve   - Pain control per primary team   - GI and surgery following for management of acute pancreatitis     24 Hour Events : No acute events overnight. Saturating appropriately on RA       Objective :  Temp:  [98.4 °F (36.9 °C)-99.7 °F (37.6 °C)] 98.4 °F (36.9 °C)  HR:  [102-103] 102  BP: (125-171)/(82-93) 125/82  Resp:  [16-18] 16  O2 Device: Nasal cannula  Nasal Cannula O2 Flow Rate (L/min):  [2 L/min] 2 L/min    Physical Exam:   General: No acute distress  HENT: Normocephalic, atraumatic.  PERRL, EOMI, Moist mucous membranes.  Neck: Supple  Lungs: Clear bilateral anterior breath sounds, diminished bibasilar breath sounds, faint expiratory wheeze L>R.  On room air  Heart: Regular rate and rhythm, S1-S2 present, no murmurs rubs or  gallops  Abdomen: Soft, nondistended, LUQ tenderness  Extremities: Warm and well perfused, no cyanosis, clubbing, or edema  Skin: Warm, dry, no rashes or lesions  Neurologic: No focal neurologic deficits      Lab Results: I have reviewed the following results:   .     04/10/25  0511   WBC 15.84*   HGB 10.7*   HCT 33.7*      SODIUM 138   K 3.6      CO2 28   BUN 8   CREATININE 0.44*   GLUC 120   MG 2.2   PHOS 2.7     ABG: No new results in last 24 hours.    Imaging Results Review: I reviewed radiology reports from this admission including: chest xray, CT abdomen/pelvis, and MRI abdomen/MRCP.  Other Study Results Review: EKG was reviewed.   PFT Results Reviewed: No prior    Derek Foster DO  Pulmonary Critical care, PGY V

## 2025-04-10 NOTE — ASSESSMENT & PLAN NOTE
54-year-old female with gallstone pancreatitis without cholecystitis, hospitalization c/b ICU admission with scans revealing possible pancreatic necrosis and fluid collections up to 6.4cm    Afebrile  +BM  Wbc 15.8 from 15.1  Tbil 0.6 yesterday    Plan:  Low fat diet  Analgesia and antiemetic as needed  DVT PPx with Lovenox  Consider d/c later today with close outpatient follow up for cholecystectomy

## 2025-04-10 NOTE — ASSESSMENT & PLAN NOTE
54-year-old female with past medical history of NELLI, hypertension, obesity, and myositis who presented to ED with back/abdominal pain with nausea/vomiting.  Found to have an elevated lipase level >12k with LFT abnormalities.  CT imaging demonstrating acute interstitial pancreatitis along with cholelithiasis but no evidence of cholecystitis or choledocholithiasis, no biliary dilation.  Patient without prior alcohol, tobacco, or marijuana use.  Medications reviewed.  Triglycerides normal and IgG normal.  Etiology of pancreatitis likely gallstone induced, advise CCY prior to discharge if possible, timing TBD per surgery team.     Over the weekend, patient with ongoing WBC elevation to 28k.  She also developed tachycardia with new onset hypoxia and was transferred to the ICU.  IV fluids initially discontinued due to concern for volume overload.  Labs also demonstrating LILIAN with serum creatinine elevation to 2.06, new onset hypoxia with possible infiltrate on CXR. Patient given lasix out of concern for volume overload. Procal elevated.  Patient also developed LILIAN with Cr increasing to 2.06, likely prerenal in setting of diuresis and stopping of IVF out of concern for fluid overload. Suspect her respiratory status is related to atelectasis with possible developing pneumonia rather than fluid overload. MRI without evidence of cholangitis but with ongoing pancreatitis without discrete collection, prominent but nondilated CBD due to inflammation, and gallstones. Repeat CT imaging completed 4/7 to further evaluate ongoing pain and worsening leukocytosis. CT with evidence of pancreatic necrosis and a developing peripancreatic fluid collection adjacent to the stomach measuring 6.4 cm. Question of possible thrombosis in the main portal vein and SMW. Collections not amenable to drainage currently. RUQ US with liver dopplers on 4/9 with patent vasculature.     Today, patient's pain continues to improve - mild pain in the LUQ.  Ambulating to the bathroom and appetite fortunately improving. Overall, patient appears to be clinical improved significantly today in comparison today. Recommend ongoing symptomatic management. Timing of cholecystectomy TBD and per general surgery team.     Plan:   Current diet: GI low fat diet, advance diet slowly as patient tolerates  Okay to discontinue IVF if patient tolerating PO intake   Monitor off antibiotics; monitor WBC and trend fever curve   No indication to trend Lipase levels   Continue on Lovenox for DVT ppx  General surgery consulted for consideration of CCY this admission  Will require CT imaging in 4-6 weeks to access necrosis and fluid collections   PRN pain and anti-emetics; additional symptom management per primary team

## 2025-04-10 NOTE — ASSESSMENT & PLAN NOTE
Imaging including CXR and CT ab showing bibasilar compressive atelectasis with small pleural effusions left > right

## 2025-04-10 NOTE — DISCHARGE INSTR - AVS FIRST PAGE
[facility] General Surgery [facility]     Discharge Care Instructions     Dr. Adin Rolon MD, [facility]     [phone]      1. General: Please call the office to schedule an appointment to be seen outpatient to discuss gallbladder removal.     2. Diet: You may resume a low fat diet. Fatty foods may cause pain related to gallstones due to contraction of your gallbladder.     3. Medications: Resume all of your previous medications, unless told otherwise by the doctor.  A good option for pain control is to start with Tylenol and ibuprofen and alternate taking them every 2 hours for 1-2 days.       4. Call the office: If you are experiencing any of the following, fevers above 101.5°, significant nausea or vomiting, if the wound develops drainage and/or is excessive redness around the wound, or if you have significant diarrhea or other worsening symptoms.     5. Follow up with GI and your primary care provider.

## 2025-04-10 NOTE — PROGRESS NOTES
"Progress Note - Surgery-General   Name: Darcie Reyes 54 y.o. female I MRN: [id number]  Unit/Bed#: E5 -01 I Date of Admission: 2025   Date of Service: 4/10/2025 I Hospital Day: 6     Assessment & Plan  Pancreatitis, acute  54-year-old female with gallstone pancreatitis without cholecystitis, hospitalization c/b ICU admission with scans revealing possible pancreatic necrosis and fluid collections up to 6.4cm    Afebrile  +BM  Wbc 15.8 from 15.1  Tbil 0.6 yesterday    Plan:  Low fat diet  Analgesia and antiemetic as needed  DVT PPx with Lovenox  Consider d/c later today with close outpatient follow up for cholecystectomy    Subjective/Objective     Subjective:   Patient seen and examined bedside.  No overnight events.  +BM  Tolerating diet.  Pain well controlled.      Objective:   Vitals:Blood pressure 140/83, pulse 97, temperature 99.5 °F (37.5 °C), temperature source Oral, resp. rate 18, height 5' 2\" (1.575 m), weight 110 kg (242 lb 4.6 oz), SpO2 97%.  Temp (24hrs), Av.3 °F (37.4 °C), Min:98.4 °F (36.9 °C), Max:99.7 °F (37.6 °C)      No intake or output data in the 24 hours ending 04/10/25 0853      Invasive Devices       Peripheral Intravenous Line  Duration             Peripheral IV 25 Left;Ventral (anterior) Forearm 3 days    Peripheral IV 25 Proximal;Right;Ventral (anterior) Forearm 3 days                    Physical Exam:  General: No acute distress, alert  CV: Well perfused, tachycardic  Lungs: Normal work of breathing, no increased respiratory effort  Abdomen: Soft, minimal epigastric tenderness to left upper abdomen, non distended  Extremities: No edema, clubbing or cyanosis  Skin: Warm, dry    Lab Results:   Lab Results   Component Value Date    WBC 15.84 (H) 04/10/2025    HGB 10.7 (L) 04/10/2025    HCT 33.7 (L) 04/10/2025    MCV 95 04/10/2025     04/10/2025     Lab Results   Component Value Date    SODIUM 138 04/10/2025    K 3.6 04/10/2025     04/10/2025    CO2 28 " 04/10/2025    BUN 8 04/10/2025    CREATININE 0.44 (L) 04/10/2025    GLUC 120 04/10/2025    CALCIUM 8.0 (L) 04/10/2025         VTE Prophylaxis: Sequential compression device (Venodyne)  and Enoxaparin (Lovenox)    Nita Olmos MD  4/10/2025

## 2025-04-10 NOTE — ASSESSMENT & PLAN NOTE
Initially meeting SIRS criteria with tachycardia, tachypnea, and leukocytosis. Procalc 1.29 from 2.74. Leukocytes trending down. Blood cultures negative   Monitoring off antibiotics in setting of acute pancreatitis   Downgraded form ICU 4/8, remains hemodynamically stable

## 2025-04-11 ENCOUNTER — TRANSITIONAL CARE MANAGEMENT (OUTPATIENT)
Dept: FAMILY MEDICINE CLINIC | Facility: CLINIC | Age: 55
End: 2025-04-11

## 2025-04-11 LAB
BACTERIA BLD CULT: NORMAL
BACTERIA BLD CULT: NORMAL

## 2025-04-11 NOTE — UTILIZATION REVIEW
NOTIFICATION OF ADMISSION DISCHARGE   This is a Notification of Discharge from [facility]. Please be advised that this patient has been discharge from our facility. Below you will find the admission and discharge date and time including the patient’s disposition.   UTILIZATION REVIEW CONTACT:  Utilization Review Assistants  Network Utilization Review Department  Phone: [phone] x carefully listen to the prompts. All voicemails are confidential.  Email: [email or web address]     ADMISSION INFORMATION  PRESENTATION DATE: 4/4/2025 12:28 AM  OBERVATION ADMISSION DATE: N/A  INPATIENT ADMISSION DATE: 4/4/25  3:21 AM   DISCHARGE DATE: 4/10/2025  4:35 PM   DISPOSITION:Home/Self Care    Network Utilization Review Department  ATTENTION: Please call with any questions or concerns to [phone] and carefully listen to the prompts so that you are directed to the right person. All voicemails are confidential.   For Discharge needs, contact Care Management DC Support Team at [phone] opt. [phone]  Send all requests for admission clinical reviews, approved or denied determinations and any other requests to dedicated fax number below belonging to the campus where the patient is receiving treatment. List of dedicated fax numbers for the Facilities:  FACILITY NAME UR FAX NUMBER   ADMISSION DENIALS (Administrative/Medical Necessity) [phone]   DISCHARGE SUPPORT TEAM ([facility]) [phone]   PARENT CHILD HEALTH (Maternity/NICU/Pediatrics) [phone]   [facility] [phone]   [facility] [phone]   [facility] [phone]   [facility] [phone]   [facility] [phone]   [facility] [phone]   [facility] [phone]   [facility] [phone]   [facility]  [facility] [phone]   [facility] [phone]   [facility] [phone]   [facility] [phone]

## 2025-04-14 ENCOUNTER — OFFICE VISIT (OUTPATIENT)
Dept: SURGERY | Facility: CLINIC | Age: 55
End: 2025-04-14
Payer: COMMERCIAL

## 2025-04-14 VITALS
HEART RATE: 90 BPM | WEIGHT: 225 LBS | DIASTOLIC BLOOD PRESSURE: 80 MMHG | RESPIRATION RATE: 16 BRPM | TEMPERATURE: 98.3 F | HEIGHT: 62 IN | OXYGEN SATURATION: 99 % | BODY MASS INDEX: 41.41 KG/M2 | SYSTOLIC BLOOD PRESSURE: 138 MMHG

## 2025-04-14 DIAGNOSIS — K85.10 GALLSTONE PANCREATITIS: Primary | ICD-10-CM

## 2025-04-14 PROCEDURE — 99204 OFFICE O/P NEW MOD 45 MIN: CPT | Performed by: STUDENT IN AN ORGANIZED HEALTH CARE EDUCATION/TRAINING PROGRAM

## 2025-04-14 NOTE — PROGRESS NOTES
Name: Darcie Reyse      : 1970      MRN: [id number]  Encounter Provider: Maulik Nelson DO  Encounter Date: 2025   Encounter department: [facility] GENERAL SURG[facility] [place]  :  Assessment & Plan  Gallstone pancreatitis  Patient had gallstone pancreatitis early April where she was hospitalized for few days including ICU stay and she had parenchymal pancreas necrosis along with significant inflammation near the head of pancreas.     Today I discussed with her at length that she will eventually need cholecystectomy however we will need to repeat her CT scan to evaluate resolution of pancreatitis. Will schedule this for mid-May, I will see her after and likely surgery in July.              History of Present Illness   Darcie Reyes is a 54 y.o. female who presents gallstone pancreatitis  Patient presents with:  Pancreatitis: Patient is here today following a hospital stay from 2025-04/10/25 for acute pancreatitis.She continues with abdominal pain and diarrhea since her discharge,            Review of Systems   Constitutional:  Negative for chills and fever.   HENT:  Negative for congestion.    Eyes:  Negative for visual disturbance.   Respiratory:  Negative for cough and shortness of breath.    Cardiovascular:  Negative for chest pain.   Gastrointestinal:  Negative for abdominal pain, nausea and vomiting.   Genitourinary:  Negative for pelvic pain.   Musculoskeletal:  Negative for back pain.   Neurological:  Negative for headaches.   Hematological:  Does not bruise/bleed easily.   All other systems reviewed and are negative.   as per HPI.  Medical History Reviewed by provider this encounter:  Tobacco  Allergies  Meds  Problems  Med Hx  Surg Hx  Fam Hx     .  Past Medical History   Past Medical History:   Diagnosis Date    Gallstone pancreatitis 2025    Myositis 2019     History reviewed. No pertinent surgical history.  Family History   Problem Relation Age of Onset    No  "Known Problems Mother     No Known Problems Father     No Known Problems Sister     No Known Problems Sister     No Known Problems Sister     No Known Problems Daughter     No Known Problems Maternal Grandmother     No Known Problems Maternal Grandfather     No Known Problems Paternal Grandmother     No Known Problems Paternal Grandfather     No Known Problems Maternal Aunt     No Known Problems Paternal Aunt     No Known Problems Paternal Aunt       reports that she has never smoked. She has never been exposed to tobacco smoke. She has never used smokeless tobacco. She reports that she does not drink alcohol and does not use drugs.  Current Outpatient Medications   Medication Instructions    Blood Pressure KIT Does not apply, 2 times daily    lisinopril (ZESTRIL) 5 mg, Oral, Daily   No Known Allergies   Current Outpatient Medications on File Prior to Visit   Medication Sig Dispense Refill    Blood Pressure KIT Use 2 (two) times a day 1 kit 0    lisinopril (ZESTRIL) 5 mg tablet Take 1 tablet (5 mg total) by mouth daily 90 tablet 2     No current facility-administered medications on file prior to visit.      Social History     Tobacco Use    Smoking status: Never     Passive exposure: Never    Smokeless tobacco: Never   Vaping Use    Vaping status: Never Used   Substance and Sexual Activity    Alcohol use: Never    Drug use: Never    Sexual activity: Not Currently     Partners: Male        Objective   /80 (BP Location: Left arm, Patient Position: Sitting, Cuff Size: Large)   Pulse 90   Temp 98.3 °F (36.8 °C) (Tympanic)   Resp 16   Ht 5' 2\" (1.575 m)   Wt 102 kg (225 lb)   SpO2 99%   BMI 41.15 kg/m²      Physical Exam  Vitals and nursing note reviewed.   Constitutional:       General: She is not in acute distress.     Appearance: Normal appearance. She is well-developed. She is not ill-appearing.   HENT:      Head: Normocephalic and atraumatic.      Right Ear: External ear normal.      Left Ear: External " ear normal.      Mouth/Throat:      Pharynx: Oropharynx is clear.   Eyes:      General:         Right eye: No discharge.         Left eye: No discharge.      Extraocular Movements: Extraocular movements intact.      Conjunctiva/sclera: Conjunctivae normal.   Cardiovascular:      Rate and Rhythm: Normal rate.   Pulmonary:      Effort: Pulmonary effort is normal. No respiratory distress.   Musculoskeletal:         General: Normal range of motion.      Cervical back: Normal range of motion.   Neurological:      General: No focal deficit present.      Mental Status: She is alert and oriented to person, place, and time.   Psychiatric:         Mood and Affect: Mood normal.           Radiology Results Review: I personally reviewed the following image studies in PACS and associated radiology reports: CT abdomen/pelvis, MRI abdomen/MRCP, and Ultrasound(s). My interpretation of the radiology images/reports is: significant pancreatitis at head of pancreas and mid-body with necrosis noted. Small gallbladder with gallstones noted.

## 2025-04-14 NOTE — ASSESSMENT & PLAN NOTE
Patient had gallstone pancreatitis early April where she was hospitalized for few days including ICU stay and she had parenchymal pancreas necrosis along with significant inflammation near the head of pancreas.     Today I discussed with her at length that she will eventually need cholecystectomy however we will need to repeat her CT scan to evaluate resolution of pancreatitis. Will schedule this for mid-May, I will see her after and likely surgery in July.

## 2025-04-18 ENCOUNTER — OFFICE VISIT (OUTPATIENT)
Dept: FAMILY MEDICINE CLINIC | Facility: CLINIC | Age: 55
End: 2025-04-18

## 2025-04-18 ENCOUNTER — TELEPHONE (OUTPATIENT)
Dept: FAMILY MEDICINE CLINIC | Facility: CLINIC | Age: 55
End: 2025-04-18

## 2025-04-18 VITALS
OXYGEN SATURATION: 97 % | WEIGHT: 221 LBS | TEMPERATURE: 98 F | HEIGHT: 62 IN | DIASTOLIC BLOOD PRESSURE: 77 MMHG | BODY MASS INDEX: 40.67 KG/M2 | SYSTOLIC BLOOD PRESSURE: 130 MMHG | RESPIRATION RATE: 19 BRPM | HEART RATE: 86 BPM

## 2025-04-18 DIAGNOSIS — K85.10 ACUTE GALLSTONE PANCREATITIS: Primary | ICD-10-CM

## 2025-04-18 PROCEDURE — 99495 TRANSJ CARE MGMT MOD F2F 14D: CPT | Performed by: FAMILY MEDICINE

## 2025-04-18 RX ORDER — NAPROXEN 500 MG/1
500 TABLET ORAL 2 TIMES DAILY WITH MEALS
Qty: 28 TABLET | Refills: 0 | Status: SHIPPED | OUTPATIENT
Start: 2025-04-18 | End: 2025-05-02

## 2025-04-18 NOTE — PROGRESS NOTES
Transition of Care Visit:  Name: Darcie Reyes      : 1970      MRN: [id number]  Encounter Provider: Esteban Rockwell MD  Encounter Date: 2025   Encounter department: [facility]    Assessment & Plan  Acute gallstone pancreatitis  54-year-old female with recent acute gallstone pancreatitis now clinically improving post hospitalization. 3/10 mild epigastric and left back pain, tolerating p.o. intake and no systemic signs.  Imaging as follows:  CT abdomen pelvis (25) notable for acute uncomplicated appendicitis and cholelithiasis.  MRI/MRCP (25) notable for acute pancreatitis with possible early pancreatic necrosis, mid to the distal common bile duct wall thickening and hepatic steatosis.  CT abdomen pelvis (25) notable for slight interval worsening of the pancreatitis with acute peripancreatic fluid collection.  US lower Doppler (25) with patent hepatic vasculature.  Will initiate naproxen for pain management.  Patient advised to get CBC and CMP completed prior to next visit.    Plan:  Laparoscopic cholecystectomy scheduled for   Naproxen 500 mg twice daily x 14 days  Low-fat diet with small frequent meals; advance diet as tolerated  Next visit for preop clearance    Orders:    naproxen (Naprosyn) 500 mg tablet; Take 1 tablet (500 mg total) by mouth 2 (two) times a day with meals for 14 days    Comprehensive metabolic panel; Future    CBC and differential; Future         History of Present Illness     Transitional Care Management Review:   Darcie Reyes is a 54 y.o. female here for TCM follow up.     During the TCM phone call patient stated:  TCM Call (since 2025)       Date and time call was made  2025  2:46 PM    Hospital care reviewed  Records reviewed    Patient was hospitialized at  [facility]    Date of Admission  25    Date of discharge  04/10/25    Diagnosis  Pancreatitis, acute    Disposition  Home    Were the  patients medications reviewed and updated  Yes    Current Symptoms  None          TCM Call (since 4/9/2025)       Post hospital issues  None    Scheduled for follow up?  Yes    Did you obtain your prescribed medications  Yes    Do you need help managing your prescriptions or medications  No    Is transportation to your appointment needed  No    I have advised the patient to call PCP with any new or worsening symptoms  Bonnie England RN    Living Arrangements  Family members          Patient is a 54-year-old female presenting today for TCM visit after recent admission for acute pancreatitis in setting of cholelithiasis.  Patient was admitted from 4/4 to 4/10.  On 4/05, patient underwent MRCP which revealed acute pancreatitis with pancreatic necrosis with mid to distal CBD wall thickening. There was concern of possible portal vein thrombosis which was ruled out with liver ultrasound.  Patient was discharged on 4/10 with planned plan laparoscopic cholecystectomy scheduled on 7/17.      Since discharge, patient reports mild epigastric and left back pain currently rating 3/10.  Reports not taking any medication for management and was not discharged on pain management.  Tolerating food well without nausea or vomiting.  Endorses normal bowel movement.  Denies fever, chills, worsening pain new.  Denies chest pain, SOB/GILLESPIE.  Patient aware of scheduled imaging and upcoming.        Review of Systems   Constitutional:  Negative for appetite change, chills, diaphoresis, fatigue and fever.   Respiratory:  Negative for shortness of breath.    Cardiovascular:  Negative for chest pain and palpitations.   Gastrointestinal:  Positive for abdominal pain (epigastric 3/10). Negative for diarrhea, nausea and vomiting.   Musculoskeletal:  Negative for myalgias.   Neurological:  Negative for dizziness, weakness, light-headedness and headaches.   Psychiatric/Behavioral:  Negative for dysphoric mood.      Objective   /77 (BP Location:  "Right arm, Patient Position: Sitting, Cuff Size: Large)   Pulse 86   Temp 98 °F (36.7 °C) (Temporal)   Resp 19   Ht 5' 2\" (1.575 m)   Wt 100 kg (221 lb)   SpO2 97%   BMI 40.42 kg/m²     Physical Exam  Constitutional:       Appearance: Normal appearance. She is obese.   HENT:      Head: Normocephalic and atraumatic.   Eyes:      Extraocular Movements: Extraocular movements intact.   Cardiovascular:      Rate and Rhythm: Normal rate and regular rhythm.      Pulses: Normal pulses.      Heart sounds: Normal heart sounds.   Pulmonary:      Effort: Pulmonary effort is normal.      Breath sounds: Normal breath sounds.   Abdominal:      Palpations: Abdomen is soft.      Tenderness: There is abdominal tenderness in the epigastric area.   Musculoskeletal:         General: Normal range of motion.      Cervical back: Normal range of motion and neck supple.      Right lower leg: No edema.      Left lower leg: No edema.   Skin:     Coloration: Skin is jaundiced.      Findings: No rash.   Neurological:      Mental Status: She is alert.      Motor: No weakness.   Psychiatric:         Mood and Affect: Mood normal.       Medications have been reviewed by provider in current encounter      "

## 2025-04-18 NOTE — TELEPHONE ENCOUNTER
PCP SIGNATURE NEEDED FOR Medical/Cardiac Clearance for Surgery FORM RECEIVED VIA FAX AND PLACED IN PCP FOLDER TO BE DELIVERED AT ASSIGNED TIMES.  .  first attempt to contact patient. left message to return my call on answering machine.

## 2025-04-18 NOTE — ASSESSMENT & PLAN NOTE
Orders:    naproxen (Naprosyn) 500 mg tablet; Take 1 tablet (500 mg total) by mouth 2 (two) times a day with meals for 14 days

## 2025-04-22 ENCOUNTER — TELEPHONE (OUTPATIENT)
Age: 55
End: 2025-04-22

## 2025-05-02 ENCOUNTER — APPOINTMENT (OUTPATIENT)
Dept: LAB | Facility: CLINIC | Age: 55
End: 2025-05-02
Payer: COMMERCIAL

## 2025-05-02 DIAGNOSIS — K85.10 ACUTE GALLSTONE PANCREATITIS: ICD-10-CM

## 2025-05-02 LAB
ALBUMIN SERPL BCG-MCNC: 4.1 G/DL (ref 3.5–5)
ALP SERPL-CCNC: 203 U/L (ref 34–104)
ALT SERPL W P-5'-P-CCNC: 66 U/L (ref 7–52)
ANION GAP SERPL CALCULATED.3IONS-SCNC: 11 MMOL/L (ref 4–13)
AST SERPL W P-5'-P-CCNC: 38 U/L (ref 13–39)
BASOPHILS # BLD AUTO: 0.06 THOUSANDS/ÂΜL (ref 0–0.1)
BASOPHILS NFR BLD AUTO: 1 % (ref 0–1)
BILIRUB SERPL-MCNC: 0.45 MG/DL (ref 0.2–1)
BUN SERPL-MCNC: 12 MG/DL (ref 5–25)
CALCIUM SERPL-MCNC: 9.4 MG/DL (ref 8.4–10.2)
CHLORIDE SERPL-SCNC: 104 MMOL/L (ref 96–108)
CO2 SERPL-SCNC: 26 MMOL/L (ref 21–32)
CREAT SERPL-MCNC: 0.56 MG/DL (ref 0.6–1.3)
EOSINOPHIL # BLD AUTO: 0.17 THOUSAND/ÂΜL (ref 0–0.61)
EOSINOPHIL NFR BLD AUTO: 3 % (ref 0–6)
ERYTHROCYTE [DISTWIDTH] IN BLOOD BY AUTOMATED COUNT: 13.9 % (ref 11.6–15.1)
GFR SERPL CREATININE-BSD FRML MDRD: 105 ML/MIN/1.73SQ M
GLUCOSE P FAST SERPL-MCNC: 98 MG/DL (ref 65–99)
HCT VFR BLD AUTO: 39.3 % (ref 34.8–46.1)
HGB BLD-MCNC: 12 G/DL (ref 11.5–15.4)
IMM GRANULOCYTES # BLD AUTO: 0.01 THOUSAND/UL (ref 0–0.2)
IMM GRANULOCYTES NFR BLD AUTO: 0 % (ref 0–2)
LYMPHOCYTES # BLD AUTO: 2.36 THOUSANDS/ÂΜL (ref 0.6–4.47)
LYMPHOCYTES NFR BLD AUTO: 42 % (ref 14–44)
MCH RBC QN AUTO: 29.8 PG (ref 26.8–34.3)
MCHC RBC AUTO-ENTMCNC: 30.5 G/DL (ref 31.4–37.4)
MCV RBC AUTO: 98 FL (ref 82–98)
MONOCYTES # BLD AUTO: 0.4 THOUSAND/ÂΜL (ref 0.17–1.22)
MONOCYTES NFR BLD AUTO: 7 % (ref 4–12)
NEUTROPHILS # BLD AUTO: 2.58 THOUSANDS/ÂΜL (ref 1.85–7.62)
NEUTS SEG NFR BLD AUTO: 47 % (ref 43–75)
NRBC BLD AUTO-RTO: 0 /100 WBCS
PLATELET # BLD AUTO: 216 THOUSANDS/UL (ref 149–390)
PMV BLD AUTO: 13.5 FL (ref 8.9–12.7)
POTASSIUM SERPL-SCNC: 4.1 MMOL/L (ref 3.5–5.3)
PROT SERPL-MCNC: 8 G/DL (ref 6.4–8.4)
RBC # BLD AUTO: 4.03 MILLION/UL (ref 3.81–5.12)
SODIUM SERPL-SCNC: 141 MMOL/L (ref 135–147)
WBC # BLD AUTO: 5.58 THOUSAND/UL (ref 4.31–10.16)

## 2025-05-02 PROCEDURE — 36415 COLL VENOUS BLD VENIPUNCTURE: CPT

## 2025-05-02 PROCEDURE — 80053 COMPREHEN METABOLIC PANEL: CPT

## 2025-05-02 PROCEDURE — 85025 COMPLETE CBC W/AUTO DIFF WBC: CPT

## 2025-05-07 ENCOUNTER — ANESTHESIA EVENT (OUTPATIENT)
Dept: PERIOP | Facility: HOSPITAL | Age: 55
End: 2025-05-07
Payer: COMMERCIAL

## 2025-05-08 ENCOUNTER — CONSULT (OUTPATIENT)
Dept: FAMILY MEDICINE CLINIC | Facility: CLINIC | Age: 55
End: 2025-05-08

## 2025-05-08 VITALS
WEIGHT: 215.2 LBS | SYSTOLIC BLOOD PRESSURE: 124 MMHG | BODY MASS INDEX: 39.6 KG/M2 | RESPIRATION RATE: 16 BRPM | HEART RATE: 81 BPM | DIASTOLIC BLOOD PRESSURE: 80 MMHG | TEMPERATURE: 97.5 F | HEIGHT: 62 IN | OXYGEN SATURATION: 96 %

## 2025-05-08 DIAGNOSIS — Z01.818 PRE-OP EXAMINATION: Primary | ICD-10-CM

## 2025-05-08 NOTE — PROGRESS NOTES
[facility] PRACTICE PRE-OPERATIVE EVALUATION  [facility] PHYSICIAN GROUP [facility] MAIDA    NAME: Darcie Reyes  AGE: 54 y.o. SEX: female  : 1970     DATE: 5/10/2025    [facility] Pre-Operative Evaluation      Chief Complaint: Pre-operative Evaluation     Surgery: CHOLECYSTECTOMY LAPAROSCOPIC W/ ROBOTICS (Abdomen)   Anticipated Date of Surgery: 2025  Referring Provider: Self, Referral       History of Present Illness:     Darcie Reyes is a 54 y.o. female who presents to the office today for a preoperative consultation at the request of surgeon, Maulik Nelson DO , who plans on performing cholecystectomy laparascopic with robotics on 25. Planned anesthesia is general. Patient has a bleeding risk of: no recent abnormal bleeding and no remote history of abnormal bleeding. Patient does not have objections to receiving blood products if needed. Current anti-platelet/anti-coagulation medications that the patient is prescribed includes:  None .      Assessment of Chronic Conditions:   - Hypertension: Well controlled. BP of 124/80 mmHg on presentation. On Lisinopril 5 mg daily for management.     Assessment of Cardiac Risk:  Denies unstable or severe angina or MI in the last 6 weeks or history of stent placement in the last year   Denies decompensated heart failure (e.g. New onset heart failure, NYHA functional class IV heart failure, or worsening existing heart failure)  Denies significant arrhythmias such as high grade AV block, symptomatic ventricular arrhythmia, newly recognized ventricular tachycardia, supraventricular tachycardia with resting heart rate >100, or symptomatic bradycardia  Denies severe heart valve disease including aortic stenosis or symptomatic mitral stenosis     Exercise Capacity:  Able to walk 4 blocks without symptoms?: Yes  Able to walk 2 flights without symptoms?: Yes    Prior Anesthesia Reactions: No     Personal history of venous  thromboembolic disease? No    History of steroid use for >2 weeks within last year? No         Review of Systems:     Review of Systems   Constitutional:  Negative for chills, diaphoresis, fatigue and fever.   HENT: Negative.     Eyes:  Negative for visual disturbance.   Respiratory:  Negative for chest tightness and shortness of breath.    Cardiovascular:  Negative for chest pain, palpitations and leg swelling.   Gastrointestinal:  Negative for abdominal pain, diarrhea, nausea and vomiting.   Musculoskeletal:  Negative for arthralgias and myalgias.   Skin: Negative.    Neurological:  Negative for dizziness, weakness, light-headedness, numbness and headaches.   Psychiatric/Behavioral:  Negative for dysphoric mood.        Current Problem List:     Patient Active Problem List   Diagnosis   • Class 3 severe obesity due to excess calories without serious comorbidity with body mass index (BMI) of 40.0 to 44.9 in adult   • Essential hypertension   • Moderate mixed hyperlipidemia not requiring statin therapy   • NELLI (obstructive sleep apnea)   • Abnormal mammogram   • Elevated alkaline phosphatase level   • Idiopathic acute pancreatitis without infection or necrosis   • Transaminitis   • Cholelithiasis   • Pancreatitis, acute   • Hepatic steatosis   • SIRS (systemic inflammatory response syndrome) (HCC)   • LILIAN (acute kidney injury) (HCC)   • Acute respiratory insufficiency   • Thrombocytopenia (HCC)   • Gallstone pancreatitis       Allergies:     No Known Allergies    Current Medications:       Current Outpatient Medications:   •  Blood Pressure KIT, Use 2 (two) times a day, Disp: 1 kit, Rfl: 0  •  lisinopril (ZESTRIL) 5 mg tablet, Take 1 tablet (5 mg total) by mouth daily, Disp: 90 tablet, Rfl: 2  •  naproxen (Naprosyn) 500 mg tablet, Take 1 tablet (500 mg total) by mouth 2 (two) times a day with meals for 14 days, Disp: 28 tablet, Rfl: 0    Past Medical History:       Past Medical History:   Diagnosis Date   • Gallstone  pancreatitis 2025   • Hypertension    • Myositis 2019   • Obesity    • Wears glasses    • Wears partial dentures     upper denture        Past Surgical History:   Procedure Laterality Date   •  SECTION     • COLONOSCOPY          Family History   Problem Relation Age of Onset   • No Known Problems Mother    • No Known Problems Father    • No Known Problems Sister    • No Known Problems Sister    • No Known Problems Sister    • No Known Problems Daughter    • No Known Problems Maternal Grandmother    • No Known Problems Maternal Grandfather    • No Known Problems Paternal Grandmother    • No Known Problems Paternal Grandfather    • No Known Problems Maternal Aunt    • No Known Problems Paternal Aunt    • No Known Problems Paternal Aunt         Social History     Socioeconomic History   • Marital status: /Civil Union     Spouse name: Not on file   • Number of children: Not on file   • Years of education: Not on file   • Highest education level: Not on file   Occupational History   • Not on file   Tobacco Use   • Smoking status: Never     Passive exposure: Never   • Smokeless tobacco: Never   Vaping Use   • Vaping status: Never Used   Substance and Sexual Activity   • Alcohol use: Yes     Comment: occ   • Drug use: Never   • Sexual activity: Not Currently     Partners: Male   Other Topics Concern   • Not on file   Social History Narrative   • Not on file     Social Drivers of Health     Financial Resource Strain: Medium Risk (2024)    Overall Financial Resource Strain (CARDIA)    • Difficulty of Paying Living Expenses: Somewhat hard   Food Insecurity: No Food Insecurity (2025)    Nursing - Inadequate Food Risk Classification    • Worried About Running Out of Food in the Last Year: Never true    • Ran Out of Food in the Last Year: Never true    • Ran Out of Food in the Last Year: Never true   Transportation Needs: No Transportation Needs (2025)    Nursing - Transportation Risk  "Classification    • Lack of Transportation: Not on file    • Lack of Transportation: No   Physical Activity: Not on file   Stress: Not on file   Social Connections: Not on file   Intimate Partner Violence: Unknown (2025)    Nursing IPS    • Feels Physically and Emotionally Safe: Not on file    • Physically Hurt by Someone: Not on file    • Humiliated or Emotionally Abused by Someone: Not on file    • Physically Hurt by Someone: No    • Hurt or Threatened by Someone: No   Housing Stability: Unknown (2025)    Nursing: Inadequate Housing Risk Classification    • Has Housing: Not on file    • Worried About Losing Housing: Not on file    • Unable to Get Utilities: Not on file    • Unable to Pay for Housing in the Last Year: No    • Has Housin        Physical Exam:     /80 (BP Location: Right arm, Patient Position: Sitting, Cuff Size: Standard)   Pulse 81   Temp 97.5 °F (36.4 °C) (Temporal)   Resp 16   Ht 5' 2\" (1.575 m)   Wt 97.6 kg (215 lb 3.2 oz)   SpO2 96%   BMI 39.36 kg/m²     Physical Exam  Constitutional:       Appearance: She is obese.   HENT:      Head: Normocephalic and atraumatic.      Mouth/Throat:      Mouth: Mucous membranes are moist.   Eyes:      Extraocular Movements: Extraocular movements intact.      Pupils: Pupils are equal, round, and reactive to light.   Cardiovascular:      Rate and Rhythm: Normal rate and regular rhythm.      Pulses: Normal pulses.      Heart sounds: Normal heart sounds. No murmur heard.  Pulmonary:      Effort: Pulmonary effort is normal.      Breath sounds: Normal breath sounds.   Abdominal:      Palpations: Abdomen is soft.      Tenderness: There is no abdominal tenderness.   Musculoskeletal:         General: Normal range of motion.      Cervical back: Normal range of motion and neck supple.      Right lower leg: No edema.      Left lower leg: No edema.   Skin:     General: Skin is warm and dry.   Neurological:      General: No focal deficit present.     "  Mental Status: She is alert.      Motor: No weakness.   Psychiatric:         Mood and Affect: Mood normal.          Data:     Pre-operative work-up    Laboratory Results: I have personally reviewed the pertinent laboratory results/reports      EKG: Sinus tachycardia with inferior infarct note don 4/06/25    Chest x-ray:       Previous cardiopulmonary studies within the past year:  Echocardiogram: None  Cardiac Catheterization: None  Stress Test: None  Pulmonary Function Testing: None      Assessment & Recommendations:     1. Pre-op examination            Pre-Op Evaluation Assessment  54 y.o. female with planned surgery: CHOLECYSTECTOMY LAPAROSCOPIC W/ ROBOTICS (Abdomen) .    Known risk factors for perioperative complications: None.      Current medications which may produce withdrawal symptoms if withheld perioperatively: None.    Pre-Op Evaluation Plan  1. Further preoperative workup as follows:   - None; no further preoperative work-up is required    2. Medication Management/Recommendations:   - Patient has been instructed to avoid herbs or non-directed vitamins the week prior to surgery to ensure no drug interactions with perioperative surgical and anesthetic medications.  - Patient to hold Lisinopril on day of surgery.    3. Prophylaxis for cardiac events with perioperative beta-blockers: not indicated.    4. Patient requires further consultation with: None    Clearance  Patient is CLEARED for surgery without any additional cardiac testing.     Esteban Rockwell MD  [address], [address] [place] [id number]  Phone#  [phone]  Fax#  [phone]

## 2025-05-09 NOTE — PRE-PROCEDURE INSTRUCTIONS
Pre-Surgery Instructions:   Medication Instructions    lisinopril (ZESTRIL) 5 mg tablet Hold day of surgery.    Medication instructions for day of surgery reviewed. Please take all instructed medications with only a sip of water.       You will receive a call one business day prior to surgery with an arrival time and hospital directions. If your surgery is scheduled on a Monday, the hospital will be calling you on the Friday prior to your surgery. If you have not heard from anyone by 8pm, please call the hospital supervisor through the hospital  at [phone]. ([place] [phone] or [place] [phone]).    Do not eat or drink anything after midnight the night before your surgery, including candy, mints, lifesavers, or chewing gum. Do not drink alcohol 24hrs before your surgery. Try not to smoke at least 24hrs before your surgery.       Follow the pre surgery showering instructions as listed in the “My Surgical Experience Booklet” or otherwise provided by your surgeon's office. Do not use a blade to shave the surgical area 1 week before surgery. It is okay to use a clean electric clippers up to 24 hours before surgery. Do not apply any lotions, creams, including makeup, cologne, deodorant, or perfumes after showering on the day of your surgery. Do not use dry shampoo, hair spray, hair gel, or any type of hair products.     No contact lenses, eye make-up, or artificial eyelashes. Remove nail polish, including gel polish, and any artificial, gel, or acrylic nails if possible. Remove all jewelry including rings and body piercing jewelry.     Wear causal clothing that is easy to take on and off. Consider your type of surgery.    Keep any valuables, jewelry, piercings at home. Please bring any specially ordered equipment (sling, braces) if indicated.    Arrange for a responsible person to drive you to and from the hospital on the day of your surgery. Please confirm the visitor policy for the day of your  procedure when you receive your phone call with an arrival time.     Call the surgeon's office with any new illnesses, exposures, or additional questions prior to surgery.    Please reference your “My Surgical Experience Booklet” for additional information to prepare for your upcoming surgery.

## 2025-05-19 ENCOUNTER — HOSPITAL ENCOUNTER (OUTPATIENT)
Dept: CT IMAGING | Facility: HOSPITAL | Age: 55
Discharge: HOME | End: 2025-05-19
Attending: STUDENT IN AN ORGANIZED HEALTH CARE EDUCATION/TRAINING PROGRAM
Payer: COMMERCIAL

## 2025-05-19 DIAGNOSIS — K85.10 GALLSTONE PANCREATITIS: ICD-10-CM

## 2025-05-19 PROCEDURE — 74177 CT ABD & PELVIS W/CONTRAST: CPT

## 2025-05-19 RX ADMIN — IOHEXOL 100 ML: 350 INJECTION, SOLUTION INTRAVENOUS at 14:57

## 2025-05-20 ENCOUNTER — ANESTHESIA (OUTPATIENT)
Dept: PERIOP | Facility: HOSPITAL | Age: 55
End: 2025-05-20
Payer: COMMERCIAL

## 2025-05-20 ENCOUNTER — HOSPITAL ENCOUNTER (OUTPATIENT)
Facility: HOSPITAL | Age: 55
Setting detail: OUTPATIENT SURGERY
Discharge: HOME | End: 2025-05-20
Attending: STUDENT IN AN ORGANIZED HEALTH CARE EDUCATION/TRAINING PROGRAM | Admitting: STUDENT IN AN ORGANIZED HEALTH CARE EDUCATION/TRAINING PROGRAM
Payer: COMMERCIAL

## 2025-05-20 VITALS
BODY MASS INDEX: 39.11 KG/M2 | DIASTOLIC BLOOD PRESSURE: 57 MMHG | HEIGHT: 62 IN | RESPIRATION RATE: 16 BRPM | OXYGEN SATURATION: 95 % | WEIGHT: 212.52 LBS | SYSTOLIC BLOOD PRESSURE: 128 MMHG | HEART RATE: 76 BPM | TEMPERATURE: 98.1 F

## 2025-05-20 DIAGNOSIS — K85.10 GALLSTONE PANCREATITIS: Primary | ICD-10-CM

## 2025-05-20 PROBLEM — N17.9 AKI (ACUTE KIDNEY INJURY) (HCC): Status: RESOLVED | Noted: 2025-04-06 | Resolved: 2025-05-20

## 2025-05-20 PROBLEM — D69.6 THROMBOCYTOPENIA (HCC): Status: RESOLVED | Noted: 2025-04-07 | Resolved: 2025-05-20

## 2025-05-20 RX ORDER — HYDROMORPHONE HCL/PF 1 MG/ML
0.5 SYRINGE (ML) INJECTION
Status: DISCONTINUED | OUTPATIENT
Start: 2025-05-20 | End: 2025-05-20 | Stop reason: HOSPADM

## 2025-05-20 RX ORDER — SODIUM CHLORIDE, SODIUM LACTATE, POTASSIUM CHLORIDE, CALCIUM CHLORIDE 600; 310; 30; 20 MG/100ML; MG/100ML; MG/100ML; MG/100ML
125 INJECTION, SOLUTION INTRAVENOUS CONTINUOUS
Status: DISCONTINUED | OUTPATIENT
Start: 2025-05-20 | End: 2025-05-20 | Stop reason: HOSPADM

## 2025-05-20 RX ORDER — ONDANSETRON 2 MG/ML
4 INJECTION INTRAMUSCULAR; INTRAVENOUS ONCE AS NEEDED
Status: DISCONTINUED | OUTPATIENT
Start: 2025-05-20 | End: 2025-05-20 | Stop reason: HOSPADM

## 2025-05-20 RX ORDER — ACETAMINOPHEN 10 MG/ML
1000 INJECTION, SOLUTION INTRAVENOUS ONCE
Status: COMPLETED | OUTPATIENT
Start: 2025-05-20 | End: 2025-05-20

## 2025-05-20 RX ORDER — PROPOFOL 10 MG/ML
INJECTION, EMULSION INTRAVENOUS CONTINUOUS PRN
Status: DISCONTINUED | OUTPATIENT
Start: 2025-05-20 | End: 2025-05-20

## 2025-05-20 RX ORDER — KETOROLAC TROMETHAMINE 30 MG/ML
INJECTION, SOLUTION INTRAMUSCULAR; INTRAVENOUS AS NEEDED
Status: DISCONTINUED | OUTPATIENT
Start: 2025-05-20 | End: 2025-05-20

## 2025-05-20 RX ORDER — DEXAMETHASONE SODIUM PHOSPHATE 10 MG/ML
INJECTION, SOLUTION INTRAMUSCULAR; INTRAVENOUS AS NEEDED
Status: DISCONTINUED | OUTPATIENT
Start: 2025-05-20 | End: 2025-05-20

## 2025-05-20 RX ORDER — MIDAZOLAM HYDROCHLORIDE 2 MG/2ML
INJECTION, SOLUTION INTRAMUSCULAR; INTRAVENOUS AS NEEDED
Status: DISCONTINUED | OUTPATIENT
Start: 2025-05-20 | End: 2025-05-20

## 2025-05-20 RX ORDER — PROMETHAZINE HYDROCHLORIDE 25 MG/ML
6.25 INJECTION, SOLUTION INTRAMUSCULAR; INTRAVENOUS ONCE AS NEEDED
Status: DISCONTINUED | OUTPATIENT
Start: 2025-05-20 | End: 2025-05-20 | Stop reason: HOSPADM

## 2025-05-20 RX ORDER — BUPIVACAINE HYDROCHLORIDE 2.5 MG/ML
INJECTION, SOLUTION EPIDURAL; INFILTRATION; INTRACAUDAL; PERINEURAL AS NEEDED
Status: DISCONTINUED | OUTPATIENT
Start: 2025-05-20 | End: 2025-05-20 | Stop reason: HOSPADM

## 2025-05-20 RX ORDER — OXYCODONE HYDROCHLORIDE 5 MG/1
5 TABLET ORAL EVERY 4 HOURS PRN
Qty: 8 TABLET | Refills: 0 | Status: SHIPPED | OUTPATIENT
Start: 2025-05-20

## 2025-05-20 RX ORDER — OXYCODONE HYDROCHLORIDE 5 MG/1
5 TABLET ORAL EVERY 4 HOURS PRN
Refills: 0 | Status: DISCONTINUED | OUTPATIENT
Start: 2025-05-20 | End: 2025-05-20 | Stop reason: HOSPADM

## 2025-05-20 RX ORDER — FENTANYL CITRATE 50 UG/ML
INJECTION, SOLUTION INTRAMUSCULAR; INTRAVENOUS AS NEEDED
Status: DISCONTINUED | OUTPATIENT
Start: 2025-05-20 | End: 2025-05-20

## 2025-05-20 RX ORDER — ROCURONIUM BROMIDE 10 MG/ML
INJECTION, SOLUTION INTRAVENOUS AS NEEDED
Status: DISCONTINUED | OUTPATIENT
Start: 2025-05-20 | End: 2025-05-20

## 2025-05-20 RX ORDER — LIDOCAINE HYDROCHLORIDE 20 MG/ML
INJECTION, SOLUTION EPIDURAL; INFILTRATION; INTRACAUDAL; PERINEURAL AS NEEDED
Status: DISCONTINUED | OUTPATIENT
Start: 2025-05-20 | End: 2025-05-20

## 2025-05-20 RX ORDER — FENTANYL CITRATE/PF 50 MCG/ML
50 SYRINGE (ML) INJECTION
Status: DISCONTINUED | OUTPATIENT
Start: 2025-05-20 | End: 2025-05-20 | Stop reason: HOSPADM

## 2025-05-20 RX ORDER — ONDANSETRON 2 MG/ML
INJECTION INTRAMUSCULAR; INTRAVENOUS AS NEEDED
Status: DISCONTINUED | OUTPATIENT
Start: 2025-05-20 | End: 2025-05-20

## 2025-05-20 RX ORDER — CEFAZOLIN SODIUM 2 G/50ML
2000 SOLUTION INTRAVENOUS ONCE
Status: COMPLETED | OUTPATIENT
Start: 2025-05-20 | End: 2025-05-20

## 2025-05-20 RX ORDER — MEPERIDINE HYDROCHLORIDE 25 MG/ML
12.5 INJECTION INTRAMUSCULAR; INTRAVENOUS; SUBCUTANEOUS ONCE AS NEEDED
Status: DISCONTINUED | OUTPATIENT
Start: 2025-05-20 | End: 2025-05-20 | Stop reason: HOSPADM

## 2025-05-20 RX ADMIN — SODIUM CHLORIDE, SODIUM LACTATE, POTASSIUM CHLORIDE, AND CALCIUM CHLORIDE 125 ML/HR: .6; .31; .03; .02 INJECTION, SOLUTION INTRAVENOUS at 07:59

## 2025-05-20 RX ADMIN — FENTANYL CITRATE 50 MCG: 50 INJECTION INTRAMUSCULAR; INTRAVENOUS at 09:27

## 2025-05-20 RX ADMIN — ROCURONIUM BROMIDE 50 MG: 10 INJECTION, SOLUTION INTRAVENOUS at 09:29

## 2025-05-20 RX ADMIN — PROPOFOL 50 MCG/KG/MIN: 10 INJECTION, EMULSION INTRAVENOUS at 09:30

## 2025-05-20 RX ADMIN — DEXAMETHASONE SODIUM PHOSPHATE 10 MG: 10 INJECTION, SOLUTION INTRAMUSCULAR; INTRAVENOUS at 09:30

## 2025-05-20 RX ADMIN — CEFAZOLIN SODIUM 2000 MG: 2 SOLUTION INTRAVENOUS at 09:32

## 2025-05-20 RX ADMIN — ONDANSETRON 4 MG: 2 INJECTION INTRAMUSCULAR; INTRAVENOUS at 10:35

## 2025-05-20 RX ADMIN — ACETAMINOPHEN 1000 MG: 10 INJECTION INTRAVENOUS at 09:42

## 2025-05-20 RX ADMIN — ROCURONIUM BROMIDE 20 MG: 10 INJECTION, SOLUTION INTRAVENOUS at 09:49

## 2025-05-20 RX ADMIN — PROPOFOL 20 MG: 10 INJECTION, EMULSION INTRAVENOUS at 10:54

## 2025-05-20 RX ADMIN — FENTANYL CITRATE 25 MCG: 50 INJECTION INTRAMUSCULAR; INTRAVENOUS at 09:53

## 2025-05-20 RX ADMIN — MIDAZOLAM HYDROCHLORIDE 2 MG: 1 INJECTION, SOLUTION INTRAMUSCULAR; INTRAVENOUS at 09:20

## 2025-05-20 RX ADMIN — PROPOFOL 20 MG: 10 INJECTION, EMULSION INTRAVENOUS at 10:51

## 2025-05-20 RX ADMIN — SODIUM CHLORIDE, SODIUM LACTATE, POTASSIUM CHLORIDE, AND CALCIUM CHLORIDE: .6; .31; .03; .02 INJECTION, SOLUTION INTRAVENOUS at 11:03

## 2025-05-20 RX ADMIN — SUGAMMADEX 100 MG: 100 INJECTION, SOLUTION INTRAVENOUS at 10:50

## 2025-05-20 RX ADMIN — KETOROLAC TROMETHAMINE 30 MG: 30 INJECTION, SOLUTION INTRAMUSCULAR; INTRAVENOUS at 10:35

## 2025-05-20 RX ADMIN — SUGAMMADEX 100 MG: 100 INJECTION, SOLUTION INTRAVENOUS at 10:46

## 2025-05-20 RX ADMIN — PROPOFOL 200 MG: 10 INJECTION, EMULSION INTRAVENOUS at 09:27

## 2025-05-20 RX ADMIN — LIDOCAINE HYDROCHLORIDE 100 MG: 20 INJECTION, SOLUTION EPIDURAL; INFILTRATION; INTRACAUDAL at 09:27

## 2025-05-20 NOTE — ANESTHESIA PREPROCEDURE EVALUATION
Procedure:  CHOLECYSTECTOMY LAPAROSCOPIC W/ ROBOTICS (Abdomen)    Relevant Problems   ANESTHESIA (within normal limits)      CARDIO   (+) Essential hypertension   (+) Moderate mixed hyperlipidemia not requiring statin therapy      ENDO (within normal limits)      GI/HEPATIC   (+) Gallstone pancreatitis   (+) Hepatic steatosis   (+) Idiopathic acute pancreatitis without infection or necrosis   (+) Pancreatitis, acute      /RENAL (within normal limits)   (+) LILIAN (acute kidney injury) (HCC) (Resolved)      GYN (within normal limits)      HEMATOLOGY   (+) Thrombocytopenia (HCC) (Resolved)      MUSCULOSKELETAL (within normal limits)      NEURO/PSYCH (within normal limits)      PULMONARY   (+) NELLI (obstructive sleep apnea)        Physical Exam    Airway     Mallampati score: III  TM Distance: <3 FB  Neck ROM: full  Mouth opening: >= 4 cm      Cardiovascular   with a grade of 1/6. , Cardiovascular exam normal    Dental   Comment: Upper Dentures     Pulmonary  Pulmonary exam normal Breath sounds clear to auscultation    Neurological    She appears awake, alert and oriented x3.      Other Findings  post-pubertal.      Anesthesia Plan  ASA Score- 2     Anesthesia Type- general with ASA Monitors.         Additional Monitors:     Airway Plan: Oral ETT.           Plan Factors-Exercise tolerance (METS): >4 METS.    Chart reviewed. EKG reviewed.  Existing labs reviewed. Patient summary reviewed.    Patient is not a current smoker.      Obstructive sleep apnea risk education given perioperatively.        Induction- intravenous.    Postoperative Plan- .   Monitoring Plan - Monitoring plan - standard ASA monitoring  Post Operative Pain Plan - multimodal analgesia        Informed Consent- Anesthetic plan and risks discussed with patient and daughter.        NPO Status:  Vitals Value Taken Time   Date of last liquid 05/19/25 05/20/25 07:49   Time of last liquid 2000 05/20/25 07:49   Date of last solid 05/19/25 05/20/25 07:49   Time  of last solid 2000 05/20/25 07:49

## 2025-05-20 NOTE — ANESTHESIA POSTPROCEDURE EVALUATION
Post-Op Assessment Note    CV Status:  Stable  Pain Score: 0    Pain management: adequate       Mental Status:  Alert and awake   Hydration Status:  Euvolemic   PONV Controlled:  Controlled   Airway Patency:  Patent  There is a medical reason for not screening for obstructive sleep apnea and/or for not using two or more mitigation strategies   Post Op Vitals Reviewed: Yes    No anethesia notable event occurred.    Staff: CRNA, other anesthesia staff           Last Filed PACU Vitals:  Vitals Value Taken Time   Temp 97.6 °F (36.4 °C) 05/20/25 11:06   Pulse 87 05/20/25 11:06   /58 05/20/25 11:07   Resp     SpO2     Vitals shown include unfiled device data.

## 2025-05-20 NOTE — INTERVAL H&P NOTE
H&P reviewed. After examining the patient I find no changes in the patients condition since the H&P had been written.     I reviewed her recent CT and inflammation near gallbladder has improved. Will proceed with cholecystectomy.    Vitals:    05/20/25 0746   BP: 141/71   Pulse: 80   Resp: 20   Temp: 97.8 °F (36.6 °C)   SpO2: 94%

## 2025-05-20 NOTE — OP NOTE
OPERATIVE REPORT  PATIENT NAME: Darcie Reyes    :  1970  MRN: [id number]  Pt Location: [facility] OR ROOM 07    SURGERY DATE: 2025    Surgeons and Role:     * Maulik Nelson DO - Primary     * Bibi Nuñez PA-C     * Tez Aldrich MD    Preop Diagnosis:  Gallstone pancreatitis [K85.10]    Post-Op Diagnosis Codes:     * Gallstone pancreatitis [K85.10]    Procedure(s):  CHOLECYSTECTOMY LAPAROSCOPIC W/ ROBOTICS    Specimen(s):  ID Type Source Tests Collected by Time Destination   1 : gallbladder Tissue Gallbladder TISSUE EXAM Maulik Isidoro Nelson DO 2025  9:37 AM        Estimated Blood Loss:   Minimal    Drains:  * No LDAs found *    Anesthesia Type:   General    Operative Indications:  Gallstone pancreatitis [K85.10]      Operative Findings:  Inflammation indicative of chronic cholecystitis   Arm 1 Cadiere  Arm 2 Camera  Arm 3 Hook  Arm 4 Prograsp       Complications:   None    Procedure and Technique:  The patient was seen again in the Holding Room. The risks, benefits, complications, treatment options, and expected outcomes were discussed with the patient. The possibilities of reaction to medication, pulmonary aspiration, perforation of viscus, bleeding, recurrent infection, finding a normal gallbladder, the need for additional procedures, failure to diagnose a condition, the possible need to convert to an open procedure, and creating a complication requiring transfusion or operation were discussed with the patient. The patient and/or family concurred with the proposed plan, giving informed consent. The site of surgery properly noted/marked. The patient was taken to Operating Room, identified and the procedure verified as Robotic Cholecystectomy with possible Intraoperative Cholangiogram. A Time Out was held after prepping and draping in sterile fashion.  The above information was confirmed.    Prior to the induction of general anesthesia, antibiotic prophylaxis was administered. General  endotracheal anesthesia was then administered and tolerated well. After the induction, the abdomen was prepped in the usual sterile fashion. The patient was positioned in the supine position.    Local anesthetic agent was injected into the skin near the umbilicus and an incision made. Veress needle was used to gain access to peritoneal cavity after confirming entry with saline drop test. Pneumoperitoneum was then created with CO2 and was tolerated well without any adverse changes in the patient's vital signs.  An 8 mm trocar was introduced to the supraumbilical location.  Camera inserted through the trocar and inspection revealed no injury from Veress needle.  Under direct visualization, additional ports were placed in right lateral (8 mm), right paraumbilical (8 mm) and left lateral sites (12 mm). Robot instruments were placed. The robot was docked.    The gallbladder was identified, the fundus grasped and retracted cephalad. Adhesions were lysed bluntly and with the electrocautery where indicated, taking care not to injure any adjacent organs or viscus. The infundibulum was grasped and retracted laterally, exposing the peritoneum overlying the triangle of [place]. This was then dissected anteriorily and posteriorly and exposed in a blunt fashion or using cautery where appropriate. The cystic duct was clearly identified and  dissected circumferentially, as was the cystic artery, as the only two tubular structures leading into the gallbladder.  The critical view of the [place] of [place] was identified.  The posterior aspect of the gallbladder was lifted off the cystic plate, to insure that there were no posterior structres behind the gallbladder.      Once this was all clearly identified, the cystic duct was then doubly ligated with clips on the patient's side and 1 on the gallbladder side.  The cystic artery was then also clipped and transected.    The gallbladder was dissected from the liver bed in retrograde  fashion with the electrocautery.  During dissection the gallbladder was entered causing spillage of several gallstones.  Hemostasis was ensured at the liver bed.  The gallbladder was placed into an endocatch bag along with the stones, secured and removed via left lateral port. The fascia was then closed using 2-0 vicryl suture using suture passer device.  Pneumoperitoneum was completely reduced after viewing removal of the trocars under direct vision.  The wound was thoroughly irrigated. The skin was then closed with 4-0 monocryl and surgical glue applied.    Instrument, sponge, and needle counts were correct at closure and at the conclusion of the case.      Dr. Nelson was present for the entire procedure.     Patient Disposition:  PACU          SIGNATURE: Tez Aldrich MD  DATE: May 20, 2025  TIME: 1:09 PM

## 2025-05-20 NOTE — ANESTHESIA POSTPROCEDURE EVALUATION
Post-Op Assessment Note    CV Status:  Stable  Pain Score: 1    Pain management: adequate       Mental Status:  Alert and awake   Hydration Status:  Euvolemic   PONV Controlled:  Controlled   Airway Patency:  Patent  Two or more mitigation strategies used for obstructive sleep apnea   Post Op Vitals Reviewed: Yes    No anethesia notable event occurred.    Staff: Anesthesiologist           Last Filed PACU Vitals:  Vitals Value Taken Time   Temp 97.6 °F (36.4 °C) 05/20/25 11:45   Pulse 80 05/20/25 12:01   /58 05/20/25 11:52   Resp 16 05/20/25 11:58   SpO2 95 % 05/20/25 12:01   Vitals shown include unfiled device data.    Modified Carlos A:     Vitals Value Taken Time   Activity 2 05/20/25 11:45   Respiration 2 05/20/25 11:45   Circulation 2 05/20/25 11:45   Consciousness 2 05/20/25 11:45   Oxygen Saturation 2 05/20/25 11:45     Modified Carlos A Score: 10

## 2025-05-20 NOTE — H&P
Name: Darcie Reyes      : 1970      MRN: [id number]  Encounter Provider: Maulik Nelson DO  Encounter Date: 2025   Encounter department: [facility] GENERAL SURGERY [place]  :  Assessment & Plan  Gallstone pancreatitis  Patient had gallstone pancreatitis early April where she was hospitalized for few days including ICU stay and she had parenchymal pancreas necrosis along with significant inflammation near the head of pancreas.      Today I discussed with her at length that she will eventually need cholecystectomy however we will need to repeat her CT scan to evaluate resolution of pancreatitis. Will schedule this for mid-May, I will see her after and likely surgery in July.      Purpose of repeat CT is to evaluate and ensure that inflammation around head of pancreas and peripancreatic fluid collection around gastric/pancreatic body is improved in order to proceed with a safe cholecystectomy in a timely manner to prevent another recurrent gallstone pancreatitis and/or cholecystitis.  Orders:    CT abdomen pelvis w contrast; Future           History of Present Illness     Darcie Reyes is a 54 y.o. female who presents gallstone pancreatitis  Patient presents with:  Pancreatitis: Patient is here today following a hospital stay from 2025-04/10/25 for acute pancreatitis.She continues with abdominal pain and diarrhea since her discharge,                Review of Systems   Constitutional:  Negative for chills and fever.   HENT:  Negative for congestion.    Eyes:  Negative for visual disturbance.   Respiratory:  Negative for cough and shortness of breath.    Cardiovascular:  Negative for chest pain.   Gastrointestinal:  Negative for abdominal pain, nausea and vomiting.   Genitourinary:  Negative for pelvic pain.   Musculoskeletal:  Negative for back pain.   Neurological:  Negative for headaches.   Hematological:  Does not bruise/bleed easily.   All other systems reviewed and are negative.   as  per HPI.  Medical History Reviewed by provider this encounter:  Tobacco  Allergies  Meds  Problems  Med Hx  Surg Hx  Fam Hx     .  Past Medical History  Medical History        Past Medical History:   Diagnosis Date    Gallstone pancreatitis 04/04/2025    Myositis 11/20/2019         Surgical History   History reviewed. No pertinent surgical history.     Family History         Family History   Problem Relation Age of Onset    No Known Problems Mother      No Known Problems Father      No Known Problems Sister      No Known Problems Sister      No Known Problems Sister      No Known Problems Daughter      No Known Problems Maternal Grandmother      No Known Problems Maternal Grandfather      No Known Problems Paternal Grandmother      No Known Problems Paternal Grandfather      No Known Problems Maternal Aunt      No Known Problems Paternal Aunt      No Known Problems Paternal Aunt            reports that she has never smoked. She has never been exposed to tobacco smoke. She has never used smokeless tobacco. She reports that she does not drink alcohol and does not use drugs.       Current Outpatient Medications   Medication Instructions    Blood Pressure KIT Does not apply, 2 times daily    lisinopril (ZESTRIL) 5 mg, Oral, Daily     Allergies   No Known Allergies     Medications Ordered Prior to Encounter          Current Outpatient Medications on File Prior to Visit   Medication Sig Dispense Refill    Blood Pressure KIT Use 2 (two) times a day 1 kit 0    lisinopril (ZESTRIL) 5 mg tablet Take 1 tablet (5 mg total) by mouth daily 90 tablet 2      No current facility-administered medications on file prior to visit.         Social History               Tobacco Use    Smoking status: Never       Passive exposure: Never    Smokeless tobacco: Never   Vaping Use    Vaping status: Never Used   Substance and Sexual Activity    Alcohol use: Never    Drug use: Never    Sexual activity: Not Currently       Partners: Male     "        Objective     /80 (BP Location: Left arm, Patient Position: Sitting, Cuff Size: Large)   Pulse 90   Temp 98.3 °F (36.8 °C) (Tympanic)   Resp 16   Ht 5' 2\" (1.575 m)   Wt 102 kg (225 lb)   SpO2 99%   BMI 41.15 kg/m²      Physical Exam  Vitals and nursing note reviewed.   Constitutional:       General: She is not in acute distress.     Appearance: Normal appearance. She is well-developed. She is not ill-appearing.   HENT:      Head: Normocephalic and atraumatic.      Right Ear: External ear normal.      Left Ear: External ear normal.      Mouth/Throat:      Pharynx: Oropharynx is clear.   Eyes:      General:         Right eye: No discharge.         Left eye: No discharge.      Extraocular Movements: Extraocular movements intact.      Conjunctiva/sclera: Conjunctivae normal.   Cardiovascular:      Rate and Rhythm: Normal rate.   Pulmonary:      Effort: Pulmonary effort is normal. No respiratory distress.   Musculoskeletal:         General: Normal range of motion.      Cervical back: Normal range of motion.   Neurological:      General: No focal deficit present.      Mental Status: She is alert and oriented to person, place, and time.   Psychiatric:         Mood and Affect: Mood normal.             Radiology Results Review: I personally reviewed the following image studies in PACS and associated radiology reports: CT abdomen/pelvis, MRI abdomen/MRCP, and Ultrasound(s). My interpretation of the radiology images/reports is: significant pancreatitis at head of pancreas and mid-body with necrosis noted. Small gallbladder with gallstones noted.     "

## 2025-05-27 ENCOUNTER — TELEPHONE (OUTPATIENT)
Age: 55
End: 2025-05-27

## 2025-05-30 NOTE — PROGRESS NOTES
"Assessment/Plan:   Darcie Reyes is a 54 y.o.female who comes in today for postoperative check after a robotic cholecystectomy on 2025 with Dr. Nelson.    1. S/P laparoscopic cholecystectomy (Primary)    - CT abdomen pelvis without contrast; Future    2. Gallstone pancreatitis    - CT abdomen pelvis without contrast; Future     Patient is doing well. Discussed CT imagine with Dr. Nelson and with patient. Purpose of repeat CT is to evaluate and ensure that inflammation around head of pancreas and peripancreatic fluid collection around gastric/pancreatic body is improved . Will order a CT in 3 months for follow up to check on improvement followed by a follow up visit with Dr. Nelson. If any issues or concerns arise, patient will call. All questions asked and answered.    Pathology: {Pathology:86394::\"Pathology reviewed with patient, all questions answered.\"}  Final Diagnosis   A. Gallbladder, Cholecystectomy:  - Mild-moderate chronic cholecystitis  - Cholelithiasis  - Adherent liver parenchyma with macro and micro steatosis, portal fibrosis, moderate chronic inflammation, and granulomatous inflammation   Postoperative restrictions reviewed.  ___________________________________  HPI:  Darcie Reyes is a 54 y.o.female who comes in today for postoperative check after recent robotic cholecystectomy on 2025 with Dr. Nelson.    Currently {Postop condition:69349}, {fever and chills:11720::\"no fever or chills\"},{Nausea/vomitin::\"no nausea\", \"no vomiting\"}. Patient reports they have resumed their normal diet. {Actions; denies/reports/admits to:63281} biliary diarrhea. Reports ***.    ROS:  General ROS: negative for - chills, fatigue, fever or night sweats, weight loss  Respiratory ROS: no cough, shortness of breath, or wheezing  Cardiovascular ROS: no chest pain or dyspnea on exertion  Genito-Urinary ROS: no dysuria, trouble voiding, or hematuria  Musculoskeletal ROS: negative for - gait disturbance, " "joint pain or muscle pain  Neurological ROS: no TIA or stroke symptoms  GI ROS: see HPI  Skin ROS: no new rashes or lesions   Lymphatic ROS: no new adenopathy noted by pt.   Psy ROS: no new mental or behavioral disturbances       Problem List[1]        Allergies:  Patient has no known allergies.    Current Medications[2]    Past Medical History[3]    Past Surgical History[4]    Family History[5]     reports that she has never smoked. She has never been exposed to tobacco smoke. She has never used smokeless tobacco. She reports current alcohol use. She reports that she does not use drugs.    Vitals:    06/03/25 1530   BP: 126/78   Pulse: 78   Resp: 16   Temp: (!) 97.2 °F (36.2 °C)   SpO2: 96%       PHYSICAL EXAM  General: {EXAM; GENERAL:01032::\"normal\",\"cooperative\",\"no distress\"}  Abdominal: {exam; abd ped:82504::\"soft\",\"nondistended\",\"nontender\"}  Incision: {Incision:62500::\"clean, dry, and intact\", \"healing well\"}        RANGEL Butcher      Date: 6/3/2025 Time: 3:48 PM           [1]   Patient Active Problem List  Diagnosis    Class 3 severe obesity due to excess calories without serious comorbidity with body mass index (BMI) of 40.0 to 44.9 in adult    Essential hypertension    Moderate mixed hyperlipidemia not requiring statin therapy    NELLI (obstructive sleep apnea)    Abnormal mammogram    Elevated alkaline phosphatase level    Idiopathic acute pancreatitis without infection or necrosis    Transaminitis    Cholelithiasis    Pancreatitis, acute    Hepatic steatosis    SIRS (systemic inflammatory response syndrome) (HCC)    Acute respiratory insufficiency    Gallstone pancreatitis   [2]   Current Outpatient Medications:     lisinopril (ZESTRIL) 5 mg tablet, Take 1 tablet (5 mg total) by mouth daily, Disp: 90 tablet, Rfl: 2    Blood Pressure KIT, Use 2 (two) times a day, Disp: 1 kit, Rfl: 0    naproxen (Naprosyn) 500 mg tablet, Take 1 tablet (500 mg total) by mouth 2 (two) times a day with meals for 14 " days (Patient not taking: Reported on 6/3/2025), Disp: 28 tablet, Rfl: 0  [3]   Past Medical History:  Diagnosis Date    Gallstone pancreatitis 2025    Hypertension     Myositis 2019    Obesity     Wears glasses     Wears partial dentures     upper denture   [4]   Past Surgical History:  Procedure Laterality Date     SECTION      COLONOSCOPY      WI LAPAROSCOPY SURG CHOLECYSTECTOMY N/A 2025    Procedure: CHOLECYSTECTOMY LAPAROSCOPIC W/ ROBOTICS;  Surgeon: Maulik Nelson DO;  Location: [facility];  Service: General   [5]   Family History  Problem Relation Name Age of Onset    No Known Problems Mother      No Known Problems Father      No Known Problems Sister      No Known Problems Sister      No Known Problems Sister      No Known Problems Daughter      No Known Problems Maternal Grandmother      No Known Problems Maternal Grandfather      No Known Problems Paternal Grandmother      No Known Problems Paternal Grandfather      No Known Problems Maternal Aunt      No Known Problems Paternal Aunt      No Known Problems Paternal Aunt

## 2025-06-03 ENCOUNTER — OFFICE VISIT (OUTPATIENT)
Dept: SURGERY | Facility: CLINIC | Age: 55
End: 2025-06-03

## 2025-06-03 VITALS
TEMPERATURE: 97.2 F | WEIGHT: 208.8 LBS | DIASTOLIC BLOOD PRESSURE: 78 MMHG | BODY MASS INDEX: 38.42 KG/M2 | HEIGHT: 62 IN | HEART RATE: 78 BPM | SYSTOLIC BLOOD PRESSURE: 126 MMHG | OXYGEN SATURATION: 96 % | RESPIRATION RATE: 16 BRPM

## 2025-06-03 DIAGNOSIS — Z90.49 S/P LAPAROSCOPIC CHOLECYSTECTOMY: Primary | ICD-10-CM

## 2025-06-03 DIAGNOSIS — K85.10 GALLSTONE PANCREATITIS: ICD-10-CM

## 2025-06-03 PROCEDURE — 99024 POSTOP FOLLOW-UP VISIT: CPT

## 2025-06-04 NOTE — PROGRESS NOTES
Name: Darcie Reyes      : 1970      MRN: [id number]  Encounter Provider: RANGEL Butcher  Encounter Date: 6/3/2025   Encounter department: [facility] SURGERY [place]  :  Assessment & Plan  S/P laparoscopic cholecystectomy  Patient is here for a postoperative check after a robotic cholecystectomy with Dr. Nelson on 2025. Patient is doing well. Discussed CT imaging with Dr. Nelson and with patient. Purpose of repeat CT is to evaluate and ensure that inflammation around head of pancreas and peripancreatic fluid collection around gastric/pancreatic body is improved . Will order a CT in 3 months for follow up to check on improvement followed by a follow up visit with Dr. Nelson. Provided patient with order and number to make appt for beginning Sept. Appointment made for follow up on CT in mid Sept. If any issues or concerns arise, patient will call. All questions asked and answered.    Pathology reviewed. All questions answered.  Final Diagnosis   A. Gallbladder, Cholecystectomy:  - Mild-moderate chronic cholecystitis  - Cholelithiasis  - Adherent liver parenchyma with macro and micro steatosis, portal fibrosis, moderate chronic inflammation, and granulomatous inflammation     Postoperative restrictions reviewed.   Orders:    CT abdomen pelvis without contrast; Future    Gallstone pancreatitis    Orders:    CT abdomen pelvis without contrast; Future        History of Present Illness   HPI  Darcie Reyes is a 54 y.o. female who presents for a postoperative check after a robotic cholecystectomy with Dr. Nelson on 2025.Doing well. No fever, chills. No N&V. No abdominal pain. Normal bowel movements. Appetite good.       Review of Systems   Constitutional:  Negative for chills and fever.   Eyes:  Negative for pain and visual disturbance.   Respiratory:  Negative for cough and shortness of breath.    Cardiovascular:  Negative for chest pain and palpitations.   Gastrointestinal:   "Negative for abdominal pain and vomiting.   Genitourinary:  Negative for dysuria and hematuria.   Musculoskeletal:  Negative for arthralgias and back pain.   Skin:  Negative for color change and rash.   Neurological:  Negative for seizures and syncope.   All other systems reviewed and are negative.         Objective   /78 (BP Location: Left arm, Patient Position: Sitting, Cuff Size: Large)   Pulse 78   Temp (!) 97.2 °F (36.2 °C) (Tympanic)   Resp 16   Ht 5' 2\" (1.575 m)   Wt 94.7 kg (208 lb 12.8 oz)   LMP  (LMP Unknown)   SpO2 96%   BMI 38.19 kg/m²      Physical Exam  Vitals and nursing note reviewed.   Constitutional:       General: She is not in acute distress.     Appearance: She is well-developed.   HENT:      Head: Normocephalic and atraumatic.     Eyes:      Conjunctiva/sclera: Conjunctivae normal.     Pulmonary:      Effort: Pulmonary effort is normal. No respiratory distress.   Abdominal:      General: There is no distension.      Palpations: Abdomen is soft.      Tenderness: There is no abdominal tenderness.      Comments: Incision sites clean, dry, intact.     Musculoskeletal:         General: No swelling.      Cervical back: Neck supple.     Skin:     General: Skin is warm and dry.      Capillary Refill: Capillary refill takes less than 2 seconds.     Neurological:      Mental Status: She is alert.     Psychiatric:         Mood and Affect: Mood normal.           "

## 2025-06-12 ENCOUNTER — TELEPHONE (OUTPATIENT)
Dept: GASTROENTEROLOGY | Facility: MEDICAL CENTER | Age: 55
End: 2025-06-12

## 2025-08-04 ENCOUNTER — OFFICE VISIT (OUTPATIENT)
Dept: FAMILY MEDICINE CLINIC | Facility: CLINIC | Age: 55
End: 2025-08-04

## 2025-08-04 VITALS
RESPIRATION RATE: 16 BRPM | BODY MASS INDEX: 37.94 KG/M2 | DIASTOLIC BLOOD PRESSURE: 70 MMHG | TEMPERATURE: 97.3 F | WEIGHT: 206.2 LBS | SYSTOLIC BLOOD PRESSURE: 120 MMHG | OXYGEN SATURATION: 97 % | HEIGHT: 62 IN | HEART RATE: 74 BPM

## 2025-08-04 DIAGNOSIS — I10 ESSENTIAL HYPERTENSION: Primary | ICD-10-CM

## 2025-08-04 PROBLEM — R65.10 SIRS (SYSTEMIC INFLAMMATORY RESPONSE SYNDROME) (HCC): Status: RESOLVED | Noted: 2025-04-05 | Resolved: 2025-08-04

## 2025-08-04 PROCEDURE — 99213 OFFICE O/P EST LOW 20 MIN: CPT | Performed by: FAMILY MEDICINE

## 2025-08-21 DIAGNOSIS — I10 ESSENTIAL HYPERTENSION: ICD-10-CM

## 2025-08-22 RX ORDER — LISINOPRIL 5 MG/1
5 TABLET ORAL DAILY
Qty: 90 TABLET | Refills: 2 | Status: SHIPPED | OUTPATIENT
Start: 2025-08-22

## (undated) DEVICE — TUBE SET SMOKE EVAC PNEUMOCLEAR HIGH FLOW

## (undated) DEVICE — SEAL

## (undated) DEVICE — PENCILETTE PUSH BUTTON COATED

## (undated) DEVICE — ALLENTOWN LAP CHOLE APP PACK: Brand: CARDINAL HEALTH

## (undated) DEVICE — PERMANENT CAUTERY HOOK: Brand: ENDOWRIST

## (undated) DEVICE — INSUFFLATION NEEDLE TO ESTABLISH PNEUMOPERITONEUM.: Brand: INSUFFLATION NEEDLE

## (undated) DEVICE — COLUMN DRAPE

## (undated) DEVICE — DRAPE SHEET X-LG

## (undated) DEVICE — GLOVE SRG BIOGEL 7

## (undated) DEVICE — SUT VICRYL PLUS 0 UR-6 27IN VCP603H

## (undated) DEVICE — TUBING SMOKE EVAC W/FILTRATION DEVICE PLUMEPORT ACTIV

## (undated) DEVICE — [HIGH FLOW INSUFFLATOR,  DO NOT USE IF PACKAGE IS DAMAGED,  KEEP DRY,  KEEP AWAY FROM SUNLIGHT,  PROTECT FROM HEAT AND RADIOACTIVE SOURCES.]: Brand: PNEUMOSURE

## (undated) DEVICE — MEDIUM-LARGE CLIP APPLIER: Brand: ENDOWRIST

## (undated) DEVICE — PMI DISPOSABLE PUNCTURE CLOSURE DEVICE / SUTURE GRASPER: Brand: PMI

## (undated) DEVICE — TISSUE RETRIEVAL SYSTEM: Brand: INZII RETRIEVAL SYSTEM

## (undated) DEVICE — PROGRASP FORCEPS: Brand: ENDOWRIST

## (undated) DEVICE — TIBURON LAPAROSCOPIC ABDOMINAL DRAPE: Brand: CONVERTORS

## (undated) DEVICE — EXOFIN PRECISION PEN HIGH VISCOSITY TOPICAL SKIN ADHESIVE: Brand: EXOFIN PRECISION PEN, 1G

## (undated) DEVICE — SYRINGE 10ML LL

## (undated) DEVICE — ARM DRAPE

## (undated) DEVICE — ASTOUND STANDARD SURGICAL GOWN, XL: Brand: CONVERTORS

## (undated) DEVICE — BLADELESS OBTURATOR: Brand: WECK VISTA

## (undated) DEVICE — REM POLYHESIVE ADULT PATIENT RETURN ELECTRODE: Brand: VALLEYLAB

## (undated) DEVICE — SCD SEQUENTIAL COMPRESSION COMFORT SLEEVE MEDIUM KNEE LENGTH: Brand: KENDALL SCD

## (undated) DEVICE — GLOVE INDICATOR PI UNDERGLOVE SZ 7.5 BLUE

## (undated) DEVICE — INTENDED FOR TISSUE SEPARATION, AND OTHER PROCEDURES THAT REQUIRE A SHARP SURGICAL BLADE TO PUNCTURE OR CUT.: Brand: BARD-PARKER SAFETY BLADES SIZE 15, STERILE

## (undated) DEVICE — SUT MONOCRYL 4-0 PS-2 27 IN Y426H

## (undated) DEVICE — HEM-O-LOK CLIP CARTRIDGE MED/LARGE DA VINCI SI/XI

## (undated) DEVICE — CADIERE FORCEPS: Brand: ENDOWRIST